# Patient Record
Sex: FEMALE | Race: WHITE | Employment: FULL TIME | URBAN - NONMETROPOLITAN AREA
[De-identification: names, ages, dates, MRNs, and addresses within clinical notes are randomized per-mention and may not be internally consistent; named-entity substitution may affect disease eponyms.]

---

## 2017-01-06 ENCOUNTER — OFFICE VISIT (OUTPATIENT)
Dept: URGENT CARE | Age: 32
End: 2017-01-06
Payer: COMMERCIAL

## 2017-01-06 VITALS
DIASTOLIC BLOOD PRESSURE: 84 MMHG | TEMPERATURE: 98.6 F | HEART RATE: 95 BPM | SYSTOLIC BLOOD PRESSURE: 116 MMHG | RESPIRATION RATE: 20 BRPM | OXYGEN SATURATION: 99 % | BODY MASS INDEX: 34.99 KG/M2 | HEIGHT: 65 IN | WEIGHT: 210 LBS

## 2017-01-06 DIAGNOSIS — G56.01 CARPAL TUNNEL SYNDROME OF RIGHT WRIST: ICD-10-CM

## 2017-01-06 DIAGNOSIS — M77.8 TENDONITIS OF WRIST, RIGHT: Primary | ICD-10-CM

## 2017-01-06 PROCEDURE — 99213 OFFICE O/P EST LOW 20 MIN: CPT | Performed by: NURSE PRACTITIONER

## 2017-01-06 RX ORDER — METHYLPREDNISOLONE 4 MG/1
TABLET ORAL
Qty: 1 KIT | Refills: 0 | Status: SHIPPED | OUTPATIENT
Start: 2017-01-06 | End: 2017-01-12

## 2017-01-06 ASSESSMENT — ENCOUNTER SYMPTOMS
RESPIRATORY NEGATIVE: 1
GASTROINTESTINAL NEGATIVE: 1

## 2017-01-11 ENCOUNTER — HOSPITAL ENCOUNTER (OUTPATIENT)
Dept: NEUROLOGY | Age: 32
Discharge: HOME | End: 2017-01-11
Payer: COMMERCIAL

## 2017-01-11 PROCEDURE — 95909 NRV CNDJ TST 5-6 STUDIES: CPT | Performed by: PSYCHIATRY & NEUROLOGY

## 2017-01-11 PROCEDURE — 95909 NRV CNDJ TST 5-6 STUDIES: CPT

## 2017-01-11 PROCEDURE — 95886 MUSC TEST DONE W/N TEST COMP: CPT

## 2017-01-11 PROCEDURE — 95886 MUSC TEST DONE W/N TEST COMP: CPT | Performed by: PSYCHIATRY & NEUROLOGY

## 2017-02-08 ENCOUNTER — OFFICE VISIT (OUTPATIENT)
Dept: NEUROLOGY | Facility: CLINIC | Age: 32
End: 2017-02-08

## 2017-02-08 VITALS
BODY MASS INDEX: 33.46 KG/M2 | DIASTOLIC BLOOD PRESSURE: 82 MMHG | SYSTOLIC BLOOD PRESSURE: 138 MMHG | HEIGHT: 64 IN | HEART RATE: 78 BPM | WEIGHT: 196 LBS

## 2017-02-08 DIAGNOSIS — G93.2 PSEUDOTUMOR CEREBRI: ICD-10-CM

## 2017-02-08 DIAGNOSIS — Z98.2 S/P VENTRICULOPERITONEAL SHUNT: ICD-10-CM

## 2017-02-08 DIAGNOSIS — G43.719 INTRACTABLE CHRONIC MIGRAINE WITHOUT AURA AND WITHOUT STATUS MIGRAINOSUS: Primary | ICD-10-CM

## 2017-02-08 PROCEDURE — 99213 OFFICE O/P EST LOW 20 MIN: CPT | Performed by: CLINICAL NURSE SPECIALIST

## 2017-02-08 RX ORDER — TOPIRAMATE 100 MG/1
100 TABLET, FILM COATED ORAL 2 TIMES DAILY
Qty: 60 TABLET | Refills: 5 | Status: SHIPPED | OUTPATIENT
Start: 2017-02-08 | End: 2017-09-08 | Stop reason: SDUPTHER

## 2017-02-08 RX ORDER — TOPIRAMATE 100 MG/1
100 TABLET, FILM COATED ORAL 2 TIMES DAILY
COMMUNITY
End: 2017-02-08 | Stop reason: SDUPTHER
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
## 2017-02-24 ENCOUNTER — HOSPITAL ENCOUNTER (EMERGENCY)
Age: 32
Discharge: LEFT BEFORE DISCHARGE | End: 2017-02-24
Payer: COMMERCIAL

## 2017-02-24 VITALS
SYSTOLIC BLOOD PRESSURE: 132 MMHG | WEIGHT: 189 LBS | DIASTOLIC BLOOD PRESSURE: 92 MMHG | RESPIRATION RATE: 18 BRPM | HEART RATE: 110 BPM | OXYGEN SATURATION: 98 % | BODY MASS INDEX: 31.49 KG/M2 | HEIGHT: 65 IN | TEMPERATURE: 98 F

## 2017-02-24 PROCEDURE — 4500000002 HC ER NO CHARGE

## 2017-02-24 ASSESSMENT — PAIN SCALES - GENERAL: PAINLEVEL_OUTOF10: 7

## 2017-02-25 ENCOUNTER — HOSPITAL ENCOUNTER (EMERGENCY)
Facility: HOSPITAL | Age: 32
Discharge: HOME | End: 2017-02-25
Attending: FAMILY MEDICINE | Admitting: FAMILY MEDICINE

## 2017-02-25 ENCOUNTER — APPOINTMENT (OUTPATIENT)
Dept: CT IMAGING | Facility: HOSPITAL | Age: 32
End: 2017-02-25

## 2017-02-25 VITALS
DIASTOLIC BLOOD PRESSURE: 86 MMHG | HEIGHT: 65 IN | BODY MASS INDEX: 31.49 KG/M2 | TEMPERATURE: 97.5 F | SYSTOLIC BLOOD PRESSURE: 123 MMHG | HEART RATE: 91 BPM | WEIGHT: 189 LBS | OXYGEN SATURATION: 100 % | RESPIRATION RATE: 16 BRPM

## 2017-02-25 DIAGNOSIS — N23 RENAL COLIC: Primary | ICD-10-CM

## 2017-02-25 LAB
ALBUMIN SERPL-MCNC: 4.5 G/DL (ref 3.5–5)
ALBUMIN/GLOB SERPL: 1.4 G/DL (ref 1.1–2.5)
ALP SERPL-CCNC: 95 U/L (ref 24–120)
ALT SERPL W P-5'-P-CCNC: 60 U/L (ref 0–54)
ANION GAP SERPL CALCULATED.3IONS-SCNC: 16 MMOL/L (ref 4–13)
AST SERPL-CCNC: 35 U/L (ref 7–45)
B-HCG UR QL: NEGATIVE
BACTERIA UR QL AUTO: ABNORMAL /HPF
BASOPHILS # BLD AUTO: 0.02 10*3/MM3 (ref 0–0.2)
BASOPHILS NFR BLD AUTO: 0.2 % (ref 0–2)
BILIRUB SERPL-MCNC: 0.8 MG/DL (ref 0.1–1)
BILIRUB UR QL STRIP: NEGATIVE
BUN BLD-MCNC: 9 MG/DL (ref 5–21)
BUN/CREAT SERPL: 8.4 (ref 7–25)
CALCIUM SPEC-SCNC: 9.6 MG/DL (ref 8.4–10.4)
CHLORIDE SERPL-SCNC: 111 MMOL/L (ref 98–110)
CLARITY UR: ABNORMAL
CO2 SERPL-SCNC: 18 MMOL/L (ref 24–31)
COLOR UR: ABNORMAL
CREAT BLD-MCNC: 1.07 MG/DL (ref 0.5–1.4)
DEPRECATED RDW RBC AUTO: 42.3 FL (ref 40–54)
EOSINOPHIL # BLD AUTO: 0.11 10*3/MM3 (ref 0–0.7)
EOSINOPHIL NFR BLD AUTO: 1.1 % (ref 0–4)
ERYTHROCYTE [DISTWIDTH] IN BLOOD BY AUTOMATED COUNT: 13.9 % (ref 12–15)
GFR SERPL CREATININE-BSD FRML MDRD: 59 ML/MIN/1.73
GLOBULIN UR ELPH-MCNC: 3.2 GM/DL
GLUCOSE BLD-MCNC: 150 MG/DL (ref 70–100)
GLUCOSE UR STRIP-MCNC: NEGATIVE MG/DL
HCT VFR BLD AUTO: 37.8 % (ref 37–47)
HGB BLD-MCNC: 13.8 G/DL (ref 12–16)
HGB UR QL STRIP.AUTO: ABNORMAL
HOLD SPECIMEN: NORMAL
HOLD SPECIMEN: NORMAL
HYALINE CASTS UR QL AUTO: ABNORMAL /LPF
IMM GRANULOCYTES # BLD: 0.03 10*3/MM3 (ref 0–0.03)
IMM GRANULOCYTES NFR BLD: 0.3 % (ref 0–5)
INTERNAL NEGATIVE CONTROL: NEGATIVE
INTERNAL POSITIVE CONTROL: POSITIVE
KETONES UR QL STRIP: NEGATIVE
LEUKOCYTE ESTERASE UR QL STRIP.AUTO: ABNORMAL
LYMPHOCYTES # BLD AUTO: 1.57 10*3/MM3 (ref 0.72–4.86)
LYMPHOCYTES NFR BLD AUTO: 16.3 % (ref 15–45)
Lab: NORMAL
MCH RBC QN AUTO: 30.8 PG (ref 28–32)
MCHC RBC AUTO-ENTMCNC: 36.5 G/DL (ref 33–36)
MCV RBC AUTO: 84.4 FL (ref 82–98)
MONOCYTES # BLD AUTO: 0.47 10*3/MM3 (ref 0.19–1.3)
MONOCYTES NFR BLD AUTO: 4.9 % (ref 4–12)
NEUTROPHILS # BLD AUTO: 7.46 10*3/MM3 (ref 1.87–8.4)
NEUTROPHILS NFR BLD AUTO: 77.2 % (ref 39–78)
NITRITE UR QL STRIP: NEGATIVE
PH UR STRIP.AUTO: 6 [PH] (ref 5–8)
PLATELET # BLD AUTO: 272 10*3/MM3 (ref 130–400)
PMV BLD AUTO: 12.4 FL (ref 6–12)
POTASSIUM BLD-SCNC: 3.7 MMOL/L (ref 3.5–5.3)
PROT SERPL-MCNC: 7.7 G/DL (ref 6.3–8.7)
PROT UR QL STRIP: ABNORMAL
RBC # BLD AUTO: 4.48 10*6/MM3 (ref 4.2–5.4)
RBC # UR: ABNORMAL /HPF
REF LAB TEST METHOD: ABNORMAL
SODIUM BLD-SCNC: 145 MMOL/L (ref 135–145)
SP GR UR STRIP: 1.03 (ref 1–1.03)
SQUAMOUS #/AREA URNS HPF: ABNORMAL /HPF
UROBILINOGEN UR QL STRIP: ABNORMAL
WBC NRBC COR # BLD: 9.66 10*3/MM3 (ref 4.8–10.8)
WBC UR QL AUTO: ABNORMAL /HPF
WHOLE BLOOD HOLD SPECIMEN: NORMAL

## 2017-02-25 PROCEDURE — 25010000002 KETOROLAC TROMETHAMINE PER 15 MG: Performed by: FAMILY MEDICINE

## 2017-02-25 PROCEDURE — 81001 URINALYSIS AUTO W/SCOPE: CPT | Performed by: FAMILY MEDICINE

## 2017-02-25 PROCEDURE — 96374 THER/PROPH/DIAG INJ IV PUSH: CPT

## 2017-02-25 PROCEDURE — 87086 URINE CULTURE/COLONY COUNT: CPT | Performed by: FAMILY MEDICINE

## 2017-02-25 PROCEDURE — 85025 COMPLETE CBC W/AUTO DIFF WBC: CPT | Performed by: FAMILY MEDICINE

## 2017-02-25 PROCEDURE — 96375 TX/PRO/DX INJ NEW DRUG ADDON: CPT

## 2017-02-25 PROCEDURE — 74176 CT ABD & PELVIS W/O CONTRAST: CPT

## 2017-02-25 PROCEDURE — 25010000002 ONDANSETRON PER 1 MG: Performed by: FAMILY MEDICINE

## 2017-02-25 PROCEDURE — 99283 EMERGENCY DEPT VISIT LOW MDM: CPT

## 2017-02-25 PROCEDURE — 80053 COMPREHEN METABOLIC PANEL: CPT | Performed by: FAMILY MEDICINE

## 2017-02-25 RX ORDER — ONDANSETRON 2 MG/ML
4 INJECTION INTRAMUSCULAR; INTRAVENOUS ONCE
Status: COMPLETED | OUTPATIENT
Start: 2017-02-25 | End: 2017-02-25

## 2017-02-25 RX ORDER — CEFUROXIME AXETIL 500 MG/1
500 TABLET ORAL 2 TIMES DAILY
Qty: 20 TABLET | Refills: 0 | Status: SHIPPED | OUTPATIENT
Start: 2017-02-25 | End: 2017-08-01

## 2017-02-25 RX ORDER — OXYCODONE AND ACETAMINOPHEN 7.5; 325 MG/1; MG/1
1 TABLET ORAL EVERY 6 HOURS PRN
Qty: 25 TABLET | Refills: 0 | Status: SHIPPED | OUTPATIENT
Start: 2017-02-25 | End: 2017-08-01

## 2017-02-25 RX ORDER — KETOROLAC TROMETHAMINE 30 MG/ML
30 INJECTION, SOLUTION INTRAMUSCULAR; INTRAVENOUS ONCE
Status: COMPLETED | OUTPATIENT
Start: 2017-02-25 | End: 2017-02-25

## 2017-02-25 RX ORDER — KETOROLAC TROMETHAMINE 10 MG/1
10 TABLET, FILM COATED ORAL EVERY 6 HOURS PRN
Qty: 15 TABLET | Refills: 0 | Status: SHIPPED | OUTPATIENT
Start: 2017-02-25 | End: 2017-08-01

## 2017-02-25 RX ADMIN — KETOROLAC TROMETHAMINE 30 MG: 30 INJECTION, SOLUTION INTRAMUSCULAR at 01:35

## 2017-02-25 RX ADMIN — ONDANSETRON 4 MG: 2 INJECTION INTRAMUSCULAR; INTRAVENOUS at 04:21

## 2017-02-27 LAB
BACTERIA SPEC AEROBE CULT: ABNORMAL
BACTERIA SPEC AEROBE CULT: ABNORMAL

## 2017-03-01 ENCOUNTER — HOSPITAL ENCOUNTER (OUTPATIENT)
Dept: GENERAL RADIOLOGY | Age: 32
Discharge: HOME | End: 2017-03-01
Payer: COMMERCIAL

## 2017-03-01 ENCOUNTER — OFFICE VISIT (OUTPATIENT)
Dept: UROLOGY | Age: 32
End: 2017-03-01
Payer: COMMERCIAL

## 2017-03-01 VITALS
HEIGHT: 65 IN | OXYGEN SATURATION: 98 % | WEIGHT: 193 LBS | TEMPERATURE: 98.2 F | BODY MASS INDEX: 32.15 KG/M2 | SYSTOLIC BLOOD PRESSURE: 126 MMHG | HEART RATE: 84 BPM | DIASTOLIC BLOOD PRESSURE: 72 MMHG

## 2017-03-01 DIAGNOSIS — N20.1 LEFT URETERAL STONE: Primary | ICD-10-CM

## 2017-03-01 DIAGNOSIS — N20.1 RIGHT URETERAL CALCULUS: ICD-10-CM

## 2017-03-01 LAB
BACTERIA URINE, POC: NORMAL
BILIRUBIN URINE: 0 MG/DL
BLOOD, URINE: NEGATIVE
CASTS URINE, POC: NORMAL
CLARITY: NORMAL
COLOR: NORMAL
CRYSTALS URINE, POC: NORMAL
EPI CELLS URINE, POC: NORMAL
GLUCOSE URINE: NORMAL
KETONES, URINE: NEGATIVE
LEUKOCYTE EST, POC: NORMAL
NITRITE, URINE: NEGATIVE
PH UA: 6 (ref 4.5–8)
PROTEIN UA: NEGATIVE
RBC URINE, POC: NORMAL
SPECIFIC GRAVITY UA: 1.02 (ref 1–1.03)
UROBILINOGEN, URINE: NORMAL
WBC URINE, POC: NORMAL
YEAST URINE, POC: NORMAL

## 2017-03-01 PROCEDURE — 81000 URINALYSIS NONAUTO W/SCOPE: CPT | Performed by: PHYSICIAN ASSISTANT

## 2017-03-01 PROCEDURE — 99214 OFFICE O/P EST MOD 30 MIN: CPT | Performed by: PHYSICIAN ASSISTANT

## 2017-03-01 PROCEDURE — 74000 XR ABDOMEN LIMITED (KUB): CPT

## 2017-03-01 RX ORDER — OXYCODONE AND ACETAMINOPHEN 7.5; 325 MG/1; MG/1
1 TABLET ORAL
COMMUNITY
Start: 2017-02-25 | End: 2017-09-22

## 2017-03-01 RX ORDER — CEFUROXIME AXETIL 500 MG/1
500 TABLET ORAL
COMMUNITY
Start: 2017-02-25 | End: 2017-03-15 | Stop reason: ALTCHOICE

## 2017-03-01 RX ORDER — KETOROLAC TROMETHAMINE 10 MG/1
TABLET, FILM COATED ORAL
Refills: 0 | COMMUNITY
Start: 2017-02-25 | End: 2017-03-01

## 2017-03-01 RX ORDER — KETOROLAC TROMETHAMINE 10 MG/1
10 TABLET, FILM COATED ORAL
COMMUNITY
Start: 2017-02-25 | End: 2017-09-22

## 2017-03-01 RX ORDER — TAMSULOSIN HYDROCHLORIDE 0.4 MG/1
0.4 CAPSULE ORAL DAILY
Qty: 30 CAPSULE | Refills: 3 | Status: SHIPPED | OUTPATIENT
Start: 2017-03-01 | End: 2017-07-11

## 2017-03-01 ASSESSMENT — ENCOUNTER SYMPTOMS
BLURRED VISION: 0
VOMITING: 0
WHEEZING: 0
HEARTBURN: 0
SORE THROAT: 0
DOUBLE VISION: 0
NAUSEA: 1
SHORTNESS OF BREATH: 0

## 2017-03-08 ENCOUNTER — OFFICE VISIT (OUTPATIENT)
Dept: UROLOGY | Age: 32
End: 2017-03-08
Payer: COMMERCIAL

## 2017-03-08 ENCOUNTER — HOSPITAL ENCOUNTER (OUTPATIENT)
Dept: GENERAL RADIOLOGY | Age: 32
Discharge: HOME | End: 2017-03-08
Payer: COMMERCIAL

## 2017-03-08 VITALS
OXYGEN SATURATION: 98 % | SYSTOLIC BLOOD PRESSURE: 128 MMHG | TEMPERATURE: 97.4 F | DIASTOLIC BLOOD PRESSURE: 74 MMHG | BODY MASS INDEX: 31.65 KG/M2 | HEIGHT: 65 IN | WEIGHT: 190 LBS | HEART RATE: 115 BPM

## 2017-03-08 DIAGNOSIS — N20.1 LEFT URETERAL STONE: ICD-10-CM

## 2017-03-08 DIAGNOSIS — N20.1 LEFT URETERAL STONE: Primary | ICD-10-CM

## 2017-03-08 LAB
BILIRUBIN, POC: NORMAL
BLOOD URINE, POC: NORMAL
CLARITY, POC: CLEAR
COLOR, POC: YELLOW
GLUCOSE URINE, POC: NORMAL
KETONES, POC: NORMAL
LEUKOCYTE EST, POC: NORMAL
NITRITE, POC: NORMAL
PH, POC: 7
PROTEIN, POC: NORMAL
SPECIFIC GRAVITY, POC: 1.01
UROBILINOGEN, POC: 0.2

## 2017-03-08 PROCEDURE — 99214 OFFICE O/P EST MOD 30 MIN: CPT | Performed by: PHYSICIAN ASSISTANT

## 2017-03-08 PROCEDURE — 81002 URINALYSIS NONAUTO W/O SCOPE: CPT | Performed by: PHYSICIAN ASSISTANT

## 2017-03-08 PROCEDURE — 74000 XR ABDOMEN LIMITED (KUB): CPT

## 2017-03-08 ASSESSMENT — ENCOUNTER SYMPTOMS
HEARTBURN: 0
NAUSEA: 1
DOUBLE VISION: 0
SHORTNESS OF BREATH: 0
SORE THROAT: 0
BLURRED VISION: 0
VOMITING: 0
WHEEZING: 0

## 2017-03-15 ENCOUNTER — OFFICE VISIT (OUTPATIENT)
Dept: OBGYN | Age: 32
End: 2017-03-15
Payer: COMMERCIAL

## 2017-03-15 ENCOUNTER — OFFICE VISIT (OUTPATIENT)
Dept: UROLOGY | Age: 32
End: 2017-03-15
Payer: COMMERCIAL

## 2017-03-15 ENCOUNTER — HOSPITAL ENCOUNTER (OUTPATIENT)
Dept: CT IMAGING | Age: 32
Discharge: HOME | End: 2017-03-15
Payer: COMMERCIAL

## 2017-03-15 VITALS
DIASTOLIC BLOOD PRESSURE: 74 MMHG | HEIGHT: 65 IN | SYSTOLIC BLOOD PRESSURE: 122 MMHG | BODY MASS INDEX: 31.49 KG/M2 | WEIGHT: 189 LBS | TEMPERATURE: 97.9 F | HEART RATE: 106 BPM | OXYGEN SATURATION: 97 %

## 2017-03-15 VITALS
DIASTOLIC BLOOD PRESSURE: 60 MMHG | WEIGHT: 189 LBS | BODY MASS INDEX: 31.49 KG/M2 | HEIGHT: 65 IN | SYSTOLIC BLOOD PRESSURE: 120 MMHG

## 2017-03-15 DIAGNOSIS — N92.6 IRREGULAR PERIODS: Primary | ICD-10-CM

## 2017-03-15 DIAGNOSIS — N20.0 RIGHT KIDNEY STONE: ICD-10-CM

## 2017-03-15 DIAGNOSIS — N20.1 LEFT URETERAL STONE: ICD-10-CM

## 2017-03-15 DIAGNOSIS — N20.1 RIGHT URETERAL CALCULUS: Primary | ICD-10-CM

## 2017-03-15 DIAGNOSIS — Z76.89 ENCOUNTER TO ESTABLISH CARE: ICD-10-CM

## 2017-03-15 PROCEDURE — 99213 OFFICE O/P EST LOW 20 MIN: CPT | Performed by: PHYSICIAN ASSISTANT

## 2017-03-15 PROCEDURE — 99214 OFFICE O/P EST MOD 30 MIN: CPT | Performed by: NURSE PRACTITIONER

## 2017-03-15 PROCEDURE — 74150 CT ABDOMEN W/O CONTRAST: CPT

## 2017-03-15 ASSESSMENT — ENCOUNTER SYMPTOMS
DOUBLE VISION: 0
SORE THROAT: 0
GASTROINTESTINAL NEGATIVE: 1
NAUSEA: 1
RESPIRATORY NEGATIVE: 1
EYES NEGATIVE: 1
HEARTBURN: 0
WHEEZING: 0
SHORTNESS OF BREATH: 0
BLURRED VISION: 0
VOMITING: 0

## 2017-04-02 ENCOUNTER — APPOINTMENT (OUTPATIENT)
Dept: GENERAL RADIOLOGY | Age: 32
End: 2017-04-02
Payer: COMMERCIAL

## 2017-04-02 ENCOUNTER — HOSPITAL ENCOUNTER (EMERGENCY)
Age: 32
Discharge: HOME | End: 2017-04-02
Payer: COMMERCIAL

## 2017-04-02 VITALS
RESPIRATION RATE: 18 BRPM | TEMPERATURE: 98.7 F | HEART RATE: 110 BPM | HEIGHT: 65 IN | BODY MASS INDEX: 30.82 KG/M2 | DIASTOLIC BLOOD PRESSURE: 83 MMHG | OXYGEN SATURATION: 96 % | WEIGHT: 185 LBS | SYSTOLIC BLOOD PRESSURE: 119 MMHG

## 2017-04-02 DIAGNOSIS — R07.89 MUSCULOSKELETAL CHEST PAIN: Primary | ICD-10-CM

## 2017-04-02 LAB — D DIMER: 0.33 NG/ML DDU (ref 0–0.48)

## 2017-04-02 PROCEDURE — 36415 COLL VENOUS BLD VENIPUNCTURE: CPT

## 2017-04-02 PROCEDURE — 99283 EMERGENCY DEPT VISIT LOW MDM: CPT | Performed by: NURSE PRACTITIONER

## 2017-04-02 PROCEDURE — 71020 XR CHEST STANDARD TWO VW: CPT

## 2017-04-02 PROCEDURE — 85379 FIBRIN DEGRADATION QUANT: CPT

## 2017-04-02 PROCEDURE — 99284 EMERGENCY DEPT VISIT MOD MDM: CPT

## 2017-04-02 RX ORDER — NAPROXEN 500 MG/1
500 TABLET ORAL 2 TIMES DAILY
Qty: 20 TABLET | Refills: 0 | Status: SHIPPED | OUTPATIENT
Start: 2017-04-02 | End: 2017-09-22

## 2017-04-02 RX ORDER — CYCLOBENZAPRINE HCL 10 MG
10 TABLET ORAL 3 TIMES DAILY PRN
Qty: 30 TABLET | Refills: 0 | Status: SHIPPED | OUTPATIENT
Start: 2017-04-02 | End: 2017-04-12

## 2017-04-02 ASSESSMENT — ENCOUNTER SYMPTOMS
SHORTNESS OF BREATH: 0
BACK PAIN: 0
DIARRHEA: 0
VOMITING: 0
COUGH: 0
WHEEZING: 0
ABDOMINAL PAIN: 0
NAUSEA: 0
SORE THROAT: 0
EYE DISCHARGE: 0

## 2017-04-02 ASSESSMENT — PAIN DESCRIPTION - ORIENTATION: ORIENTATION: LEFT

## 2017-04-02 ASSESSMENT — PAIN SCALES - GENERAL: PAINLEVEL_OUTOF10: 6

## 2017-04-02 ASSESSMENT — PAIN DESCRIPTION - LOCATION: LOCATION: SHOULDER

## 2017-04-02 ASSESSMENT — PAIN DESCRIPTION - DESCRIPTORS: DESCRIPTORS: SHARP

## 2017-04-02 ASSESSMENT — PAIN DESCRIPTION - PAIN TYPE: TYPE: ACUTE PAIN

## 2017-06-16 ENCOUNTER — TELEPHONE (OUTPATIENT)
Dept: NEUROLOGY | Facility: CLINIC | Age: 32
End: 2017-06-16

## 2017-06-16 RX ORDER — PROMETHAZINE HYDROCHLORIDE 25 MG/1
25 TABLET ORAL EVERY 6 HOURS PRN
Qty: 10 TABLET | Refills: 0 | Status: SHIPPED | OUTPATIENT
Start: 2017-06-16 | End: 2017-08-01

## 2017-06-16 RX ORDER — SUMATRIPTAN 50 MG/1
TABLET, FILM COATED ORAL
Qty: 30 TABLET | Refills: 2 | Status: SHIPPED | OUTPATIENT
Start: 2017-06-16 | End: 2018-01-30 | Stop reason: SDUPTHER

## 2017-06-16 NOTE — TELEPHONE ENCOUNTER
Sandy called to let Amie Cerda know that she has had a headache for the last 2 weeks and Ibuprofen is not helping at all. She is calling in to see if Amie will call in something for nausea and something to get rid of the headache. I did tell her that I will send a message and call her back this afternoon once I have an answer for her. She did voice understanding.

## 2017-06-17 ENCOUNTER — HOSPITAL ENCOUNTER (EMERGENCY)
Facility: HOSPITAL | Age: 32
Discharge: HOME | End: 2017-06-17
Admitting: EMERGENCY MEDICINE

## 2017-06-17 ENCOUNTER — APPOINTMENT (OUTPATIENT)
Dept: CT IMAGING | Facility: HOSPITAL | Age: 32
End: 2017-06-17

## 2017-06-17 VITALS
OXYGEN SATURATION: 98 % | SYSTOLIC BLOOD PRESSURE: 113 MMHG | BODY MASS INDEX: 28.66 KG/M2 | WEIGHT: 172 LBS | HEIGHT: 65 IN | TEMPERATURE: 98.6 F | RESPIRATION RATE: 16 BRPM | HEART RATE: 84 BPM | DIASTOLIC BLOOD PRESSURE: 70 MMHG

## 2017-06-17 DIAGNOSIS — R51.9 NONINTRACTABLE HEADACHE, UNSPECIFIED CHRONICITY PATTERN, UNSPECIFIED HEADACHE TYPE: Primary | ICD-10-CM

## 2017-06-17 LAB
B-HCG UR QL: NEGATIVE
INTERNAL NEGATIVE CONTROL: NEGATIVE
INTERNAL POSITIVE CONTROL: POSITIVE
Lab: NORMAL

## 2017-06-17 PROCEDURE — 70450 CT HEAD/BRAIN W/O DYE: CPT

## 2017-06-17 PROCEDURE — 99283 EMERGENCY DEPT VISIT LOW MDM: CPT

## 2017-06-17 NOTE — DISCHARGE INSTRUCTIONS
Please f/u with your PCP in 1-2 days for a recheck  R/t to the ER as needed  Please f/u with Dr. Garcia on Monday
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
## 2017-07-11 ENCOUNTER — HOSPITAL ENCOUNTER (EMERGENCY)
Age: 32
Discharge: HOME | End: 2017-07-11
Payer: COMMERCIAL

## 2017-07-11 ENCOUNTER — APPOINTMENT (OUTPATIENT)
Dept: CT IMAGING | Age: 32
End: 2017-07-11
Payer: COMMERCIAL

## 2017-07-11 VITALS
BODY MASS INDEX: 28.66 KG/M2 | TEMPERATURE: 98.3 F | OXYGEN SATURATION: 98 % | WEIGHT: 172 LBS | SYSTOLIC BLOOD PRESSURE: 118 MMHG | HEART RATE: 102 BPM | RESPIRATION RATE: 18 BRPM | DIASTOLIC BLOOD PRESSURE: 78 MMHG | HEIGHT: 65 IN

## 2017-07-11 DIAGNOSIS — N20.1 LEFT URETERAL CALCULUS: Primary | ICD-10-CM

## 2017-07-11 LAB
ALBUMIN SERPL-MCNC: 4.1 G/DL (ref 3.5–5.2)
ALP BLD-CCNC: 92 U/L (ref 35–104)
ALT SERPL-CCNC: 15 U/L (ref 5–33)
ANION GAP SERPL CALCULATED.3IONS-SCNC: 13 MMOL/L (ref 7–19)
AST SERPL-CCNC: 16 U/L (ref 5–32)
BACTERIA: NEGATIVE /HPF
BASOPHILS ABSOLUTE: 0.1 K/UL (ref 0–0.2)
BASOPHILS RELATIVE PERCENT: 0.5 % (ref 0–1)
BILIRUB SERPL-MCNC: 0.4 MG/DL (ref 0.2–1.2)
BILIRUBIN URINE: NEGATIVE
BLOOD, URINE: ABNORMAL
BUN BLDV-MCNC: 13 MG/DL (ref 6–20)
CALCIUM SERPL-MCNC: 9.1 MG/DL (ref 8.6–10)
CHLORIDE BLD-SCNC: 107 MMOL/L (ref 98–111)
CLARITY: ABNORMAL
CO2: 22 MMOL/L (ref 22–29)
COLOR: YELLOW
CREAT SERPL-MCNC: 0.8 MG/DL (ref 0.5–0.9)
EOSINOPHILS ABSOLUTE: 0.4 K/UL (ref 0–0.6)
EOSINOPHILS RELATIVE PERCENT: 2.8 % (ref 0–5)
EPITHELIAL CELLS, UA: 16 /HPF (ref 0–5)
GFR NON-AFRICAN AMERICAN: >60
GLUCOSE BLD-MCNC: 128 MG/DL (ref 74–109)
GLUCOSE URINE: NEGATIVE MG/DL
HCG(URINE) PREGNANCY TEST: NEGATIVE
HCT VFR BLD CALC: 38.5 % (ref 37–47)
HEMOGLOBIN: 13.6 G/DL (ref 12–16)
HYALINE CASTS: 4 /HPF (ref 0–8)
KETONES, URINE: NEGATIVE MG/DL
LEUKOCYTE ESTERASE, URINE: ABNORMAL
LYMPHOCYTES ABSOLUTE: 3.3 K/UL (ref 1.1–4.5)
LYMPHOCYTES RELATIVE PERCENT: 24.9 % (ref 20–40)
MCH RBC QN AUTO: 31.6 PG (ref 27–31)
MCHC RBC AUTO-ENTMCNC: 35.3 G/DL (ref 33–37)
MCV RBC AUTO: 89.5 FL (ref 81–99)
MONOCYTES ABSOLUTE: 0.7 K/UL (ref 0–0.9)
MONOCYTES RELATIVE PERCENT: 5.4 % (ref 0–10)
NEUTROPHILS ABSOLUTE: 8.6 K/UL (ref 1.5–7.5)
NEUTROPHILS RELATIVE PERCENT: 65.3 % (ref 50–65)
NITRITE, URINE: NEGATIVE
PDW BLD-RTO: 13.3 % (ref 11.5–14.5)
PH UA: 6
PLATELET # BLD: 282 K/UL (ref 130–400)
PMV BLD AUTO: 11.4 FL (ref 9.4–12.3)
POTASSIUM SERPL-SCNC: 3.8 MMOL/L (ref 3.5–5)
PROTEIN UA: ABNORMAL MG/DL
RBC # BLD: 4.3 M/UL (ref 4.2–5.4)
RBC UA: >900 /HPF (ref 0–4)
SODIUM BLD-SCNC: 142 MMOL/L (ref 136–145)
SPECIFIC GRAVITY UA: 1.02
TOTAL PROTEIN: 7.5 G/DL (ref 6.6–8.7)
UROBILINOGEN, URINE: 0.2 E.U./DL
WBC # BLD: 13.1 K/UL (ref 4.8–10.8)
WBC UA: 21 /HPF (ref 0–5)

## 2017-07-11 PROCEDURE — 81001 URINALYSIS AUTO W/SCOPE: CPT

## 2017-07-11 PROCEDURE — 99284 EMERGENCY DEPT VISIT MOD MDM: CPT

## 2017-07-11 PROCEDURE — 81025 URINE PREGNANCY TEST: CPT

## 2017-07-11 PROCEDURE — 85025 COMPLETE CBC W/AUTO DIFF WBC: CPT

## 2017-07-11 PROCEDURE — 74150 CT ABDOMEN W/O CONTRAST: CPT

## 2017-07-11 PROCEDURE — 99283 EMERGENCY DEPT VISIT LOW MDM: CPT | Performed by: PHYSICIAN ASSISTANT

## 2017-07-11 PROCEDURE — 36415 COLL VENOUS BLD VENIPUNCTURE: CPT

## 2017-07-11 PROCEDURE — 80053 COMPREHEN METABOLIC PANEL: CPT

## 2017-07-11 PROCEDURE — 87086 URINE CULTURE/COLONY COUNT: CPT

## 2017-07-11 RX ORDER — HYDROCODONE BITARTRATE AND ACETAMINOPHEN 5; 325 MG/1; MG/1
1 TABLET ORAL EVERY 6 HOURS PRN
Qty: 15 TABLET | Refills: 0 | Status: SHIPPED | OUTPATIENT
Start: 2017-07-11 | End: 2017-07-18

## 2017-07-11 RX ORDER — ONDANSETRON 2 MG/ML
4 INJECTION INTRAMUSCULAR; INTRAVENOUS ONCE
Status: DISCONTINUED | OUTPATIENT
Start: 2017-07-11 | End: 2017-07-11

## 2017-07-11 RX ORDER — 0.9 % SODIUM CHLORIDE 0.9 %
1000 INTRAVENOUS SOLUTION INTRAVENOUS ONCE
Status: DISCONTINUED | OUTPATIENT
Start: 2017-07-11 | End: 2017-07-11

## 2017-07-11 RX ORDER — MORPHINE SULFATE 4 MG/ML
4 INJECTION, SOLUTION INTRAMUSCULAR; INTRAVENOUS ONCE
Status: DISCONTINUED | OUTPATIENT
Start: 2017-07-11 | End: 2017-07-11

## 2017-07-11 RX ORDER — ONDANSETRON 4 MG/1
4 TABLET, ORALLY DISINTEGRATING ORAL EVERY 8 HOURS PRN
Qty: 20 TABLET | Refills: 0 | Status: SHIPPED | OUTPATIENT
Start: 2017-07-11 | End: 2017-09-22

## 2017-07-11 RX ORDER — TAMSULOSIN HYDROCHLORIDE 0.4 MG/1
0.4 CAPSULE ORAL DAILY
Qty: 15 CAPSULE | Refills: 0 | Status: SHIPPED | OUTPATIENT
Start: 2017-07-11 | End: 2017-09-22

## 2017-07-11 ASSESSMENT — PAIN DESCRIPTION - PAIN TYPE: TYPE: ACUTE PAIN

## 2017-07-11 ASSESSMENT — ENCOUNTER SYMPTOMS
APNEA: 0
CHEST TIGHTNESS: 0
DIARRHEA: 0
CONSTIPATION: 0
ABDOMINAL DISTENTION: 0
VOMITING: 0
WHEEZING: 0
EYE PAIN: 0
NAUSEA: 0
COUGH: 0
RHINORRHEA: 0
SINUS PRESSURE: 0
SHORTNESS OF BREATH: 0
SORE THROAT: 0
ABDOMINAL PAIN: 0

## 2017-07-11 ASSESSMENT — PAIN SCALES - GENERAL: PAINLEVEL_OUTOF10: 7

## 2017-07-11 ASSESSMENT — PAIN DESCRIPTION - ORIENTATION: ORIENTATION: LEFT

## 2017-07-11 ASSESSMENT — PAIN DESCRIPTION - LOCATION: LOCATION: ABDOMEN

## 2017-07-13 LAB — URINE CULTURE, ROUTINE: NORMAL

## 2017-08-01 ENCOUNTER — OFFICE VISIT (OUTPATIENT)
Dept: NEUROSURGERY | Facility: CLINIC | Age: 32
End: 2017-08-01

## 2017-08-01 VITALS
BODY MASS INDEX: 28.66 KG/M2 | SYSTOLIC BLOOD PRESSURE: 124 MMHG | HEIGHT: 65 IN | DIASTOLIC BLOOD PRESSURE: 78 MMHG | WEIGHT: 172 LBS

## 2017-08-01 DIAGNOSIS — G93.2 PSEUDOTUMOR CEREBRI: Primary | ICD-10-CM

## 2017-08-01 DIAGNOSIS — Z78.9 NON-SMOKER: ICD-10-CM

## 2017-08-01 DIAGNOSIS — G43.719 INTRACTABLE CHRONIC MIGRAINE WITHOUT AURA AND WITHOUT STATUS MIGRAINOSUS: ICD-10-CM

## 2017-08-01 PROCEDURE — 99213 OFFICE O/P EST LOW 20 MIN: CPT | Performed by: NEUROLOGICAL SURGERY

## 2017-08-01 NOTE — PATIENT INSTRUCTIONS

## 2017-08-16 ENCOUNTER — OFFICE VISIT (OUTPATIENT)
Dept: RETAIL CLINIC | Facility: CLINIC | Age: 32
End: 2017-08-16

## 2017-08-16 VITALS
HEART RATE: 98 BPM | WEIGHT: 177.8 LBS | BODY MASS INDEX: 29.62 KG/M2 | OXYGEN SATURATION: 98 % | DIASTOLIC BLOOD PRESSURE: 68 MMHG | RESPIRATION RATE: 18 BRPM | SYSTOLIC BLOOD PRESSURE: 132 MMHG | TEMPERATURE: 97.7 F | HEIGHT: 65 IN
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
## 2017-08-16 PROCEDURE — 99213 OFFICE O/P EST LOW 20 MIN: CPT | Performed by: NURSE PRACTITIONER

## 2017-08-16 RX ORDER — DIPHENHYDRAMINE HCL 25 MG
25 TABLET ORAL EVERY 6 HOURS PRN
Qty: 60 TABLET | Refills: 0 | Status: SHIPPED | OUTPATIENT
Start: 2017-08-16 | End: 2018-03-22
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
## 2017-08-16 NOTE — PATIENT INSTRUCTIONS
Treatment     Treatment overview      eradication of the bed bug infections is key to management(1, 4, 5)    educate patients about    identification of bed bugs    risk factor avoidance    nonchemical eradication strategies    advise that chemical insecticides be applied by professionals    efficacy may be greater with professional application as every object in environment must be treated    misuse has been associated with significant toxicities, including 1 death    advise patients to evaluate success of eradication strategy about 4-20 days after first treatment    professionals may be better able to identify bed bug hiding places    second application of insecticides may be improve efficacy    treatment of skin reactions to bed bug bites is supportive(1, 4, 5)    consider topical agents to help with symptom control for most patients, including    over-the-counter or prescription topical antipruritic agents    an intermediate potency corticosteroid, such as 1% hydrocortisone once or twice daily for up to 7 days    reserve systemic therapy, such as antihistamines, for those with severe symptoms disturbing sleep    note that topical mupirocin or systemic antibiotics may be needed for bacterial superinfection(1)  Bed bug eradication     Identification of bed bugs      educate patients about(1, 3)    bed bug characteristics    oval shaped, flat, approximately 5 mm long (1 inch), easily visible    reddish brown in color (immature bugs may be light yellow)    active at night, avoid light    most often found in mattress seams, crevices in box spring, back sides of headboard, spaces under baseboards, or loose wallpaper    signs of infestation or recent presence of bed bugs    red-brown blood stains on bed sheets, mattress, and box spring    small brown-black marks (fecal matter) in furnishing crevices, especially mattress and baseboards    skin casts    offensive odor may be present if large number of bugs  Nonchemical  pest control      nonchemical eradication strategies may be helpful but efficacy data limited; options include(4)    disposal of heavily infested mattresses or linens    washing clothes or linens at high or freezing temperatures    physical removal of bed bugs and mattress covers and vacuuming  Chemical control      3 major classes of insecticides are used for eradication(1, 2, 4)    pyrethroids (most common)    insect growth regulators    carbamates    effectiveness of insecticides depends on factors such as    extent of infestation    preparation of infested area prior to application (such as removal of hiding places and laundering of linens)    quality and coverage of pesticide application    stage of life of the bed bug    susceptibility of bed bug population to specific class of insecticide    Reference - [place] Environmental Protection Agency (EPA) Effectiveness of Bed Bug Pesticides (EPA 2016 Mar 29)    combination insecticide application is typical(1, 2, 4)    used to target all bed bugs at all stages of their lifecycle    used to overcome potential resistance, which has been detected to all insecticide classes    insecticides should be applied by professionals(2)    successful eradication requires thorough application    misuse can result in toxicities, with 1 death reported from bed bug insecticide use     insecticide-related illness may occur with excessive or improper application of bed bug insecticide    based on data collected by [place] Event Notification System for Occupational Risks (SENSOR)-Pesticides program and [facility] Department of Health and Mental Hygiene ([facility])    111 cases occurred in 7 states from 2003 to 2010 in [place]    81% considered low severity    93% occurred in private residence    39% applied by residents who were not certified    reported health outcomes included    neurologic symptoms in 40% (headache, dizziness)    respiratory symptoms in 40% (including  dyspnea and upper respiratory tract pain or irritation)    gastrointestinal symptoms in 33% (nausea and vomiting)    contributing factors included    excessive application of pesticide 18%    failure to wash or change bedding after pesticide use 16%    inadequate notification of pesticide use 11%    1 fatality in woman with history of kidney disease, heart disease, and diabetes and excessive use of pesticide    Reference - MMWR Morb Mortal Wkly Rep 2011 Sep 23;6037):1269[employer] Full Text full-text  Evaluation of success of eradication strategies      consider re-inspection of infested area by a pest-control professional 4-20 days after first intervention(1, 2, 4)    consider reapplication of insecticides at this stage as optimal strategy for control not yet determined(1, 2, 4)

## 2017-09-12 RX ORDER — TOPIRAMATE 100 MG/1
TABLET, FILM COATED ORAL
Qty: 60 TABLET | Refills: 5 | Status: SHIPPED | OUTPATIENT
Start: 2017-09-12 | End: 2018-03-20 | Stop reason: SDUPTHER

## 2017-09-22 ENCOUNTER — OFFICE VISIT (OUTPATIENT)
Dept: OBGYN | Age: 32
End: 2017-09-22
Payer: COMMERCIAL

## 2017-09-22 VITALS
SYSTOLIC BLOOD PRESSURE: 107 MMHG | TEMPERATURE: 98.3 F | WEIGHT: 176 LBS | BODY MASS INDEX: 30.05 KG/M2 | HEIGHT: 64 IN | DIASTOLIC BLOOD PRESSURE: 82 MMHG | HEART RATE: 106 BPM

## 2017-09-22 DIAGNOSIS — Z01.419 WOMEN'S ANNUAL ROUTINE GYNECOLOGICAL EXAMINATION: Primary | ICD-10-CM

## 2017-09-22 DIAGNOSIS — Z12.4 SCREENING FOR CERVICAL CANCER: ICD-10-CM

## 2017-09-22 DIAGNOSIS — N92.6 IRREGULAR MENSES: ICD-10-CM

## 2017-09-22 DIAGNOSIS — Z31.69 INFERTILITY COUNSELING: ICD-10-CM

## 2017-09-22 PROCEDURE — 99395 PREV VISIT EST AGE 18-39: CPT | Performed by: OBSTETRICS & GYNECOLOGY

## 2017-09-22 RX ORDER — CITALOPRAM 40 MG/1
TABLET ORAL
Refills: 1 | COMMUNITY
Start: 2017-09-20 | End: 2018-11-25

## 2017-09-22 ASSESSMENT — ENCOUNTER SYMPTOMS
RESPIRATORY NEGATIVE: 1
EYES NEGATIVE: 1
GASTROINTESTINAL NEGATIVE: 1
ALLERGIC/IMMUNOLOGIC NEGATIVE: 1

## 2017-09-28 ENCOUNTER — TELEPHONE (OUTPATIENT)
Dept: OBGYN | Age: 32
End: 2017-09-28

## 2018-01-30 ENCOUNTER — TELEPHONE (OUTPATIENT)
Dept: NEUROLOGY | Facility: CLINIC | Age: 33
End: 2018-01-30

## 2018-01-30 RX ORDER — SUMATRIPTAN 50 MG/1
TABLET, FILM COATED ORAL
Qty: 9 TABLET | Refills: 2 | Status: SHIPPED | OUTPATIENT
Start: 2018-01-30 | End: 2019-02-13 | Stop reason: SINTOL

## 2018-02-07 ENCOUNTER — TELEPHONE (OUTPATIENT)
Dept: OBGYN | Age: 33
End: 2018-02-07

## 2018-02-07 DIAGNOSIS — N97.9 INFERTILITY, FEMALE: Primary | ICD-10-CM

## 2018-02-09 NOTE — TELEPHONE ENCOUNTER
Returned call to pt and told her that her labs were up to date and that she needs an HSG as discussed at her last appt. Pt states she wishes to do that here rather than [facility]. I instructed her to call when she starts her period and we will schedule 7-11 days after it starts. Pt v/u.

## 2018-02-21 ENCOUNTER — TELEPHONE (OUTPATIENT)
Dept: OBGYN | Age: 33
End: 2018-02-21

## 2018-02-25 ENCOUNTER — HOSPITAL ENCOUNTER (EMERGENCY)
Age: 33
Discharge: HOME | End: 2018-02-26
Attending: EMERGENCY MEDICINE
Payer: COMMERCIAL

## 2018-02-25 DIAGNOSIS — N23 RENAL COLIC: ICD-10-CM

## 2018-02-25 DIAGNOSIS — N20.1 URETEROLITHIASIS: ICD-10-CM

## 2018-02-25 DIAGNOSIS — R10.9 RIGHT FLANK PAIN: Primary | ICD-10-CM

## 2018-02-25 LAB
BASOPHILS ABSOLUTE: 0.1 K/UL (ref 0–0.2)
BASOPHILS RELATIVE PERCENT: 0.5 % (ref 0–1)
EOSINOPHILS ABSOLUTE: 0.4 K/UL (ref 0–0.6)
EOSINOPHILS RELATIVE PERCENT: 3 % (ref 0–5)
HCG QUALITATIVE: NEGATIVE
HCT VFR BLD CALC: 38.3 % (ref 37–47)
HEMOGLOBIN: 13.2 G/DL (ref 12–16)
LYMPHOCYTES ABSOLUTE: 3 K/UL (ref 1.1–4.5)
LYMPHOCYTES RELATIVE PERCENT: 22.7 % (ref 20–40)
MCH RBC QN AUTO: 31.5 PG (ref 27–31)
MCHC RBC AUTO-ENTMCNC: 34.5 G/DL (ref 33–37)
MCV RBC AUTO: 91.4 FL (ref 81–99)
MONOCYTES ABSOLUTE: 0.7 K/UL (ref 0–0.9)
MONOCYTES RELATIVE PERCENT: 5.5 % (ref 0–10)
NEUTROPHILS ABSOLUTE: 8.8 K/UL (ref 1.5–7.5)
NEUTROPHILS RELATIVE PERCENT: 66.9 % (ref 50–65)
PDW BLD-RTO: 13.5 % (ref 11.5–14.5)
PLATELET # BLD: 269 K/UL (ref 130–400)
PMV BLD AUTO: 10.9 FL (ref 9.4–12.3)
RBC # BLD: 4.19 M/UL (ref 4.2–5.4)
WBC # BLD: 13.1 K/UL (ref 4.8–10.8)

## 2018-02-25 PROCEDURE — 99284 EMERGENCY DEPT VISIT MOD MDM: CPT

## 2018-02-25 PROCEDURE — 6360000002 HC RX W HCPCS: Performed by: EMERGENCY MEDICINE

## 2018-02-25 PROCEDURE — 96375 TX/PRO/DX INJ NEW DRUG ADDON: CPT

## 2018-02-25 PROCEDURE — 96374 THER/PROPH/DIAG INJ IV PUSH: CPT

## 2018-02-25 RX ORDER — TAMSULOSIN HYDROCHLORIDE 0.4 MG/1
0.4 CAPSULE ORAL DAILY
COMMUNITY
End: 2018-02-26

## 2018-02-25 RX ORDER — MORPHINE SULFATE 4 MG/ML
4 INJECTION, SOLUTION INTRAMUSCULAR; INTRAVENOUS
Status: COMPLETED | OUTPATIENT
Start: 2018-02-25 | End: 2018-02-26

## 2018-02-25 RX ORDER — ONDANSETRON 2 MG/ML
4 INJECTION INTRAMUSCULAR; INTRAVENOUS EVERY 30 MIN PRN
Status: DISCONTINUED | OUTPATIENT
Start: 2018-02-25 | End: 2018-02-26 | Stop reason: HOSPADM

## 2018-02-25 RX ADMIN — ONDANSETRON 4 MG: 2 INJECTION, SOLUTION INTRAMUSCULAR; INTRAVENOUS at 23:48

## 2018-02-25 RX ADMIN — MORPHINE SULFATE 4 MG: 4 INJECTION, SOLUTION INTRAMUSCULAR; INTRAVENOUS at 23:48

## 2018-02-25 ASSESSMENT — PAIN SCALES - GENERAL
PAINLEVEL_OUTOF10: 10
PAINLEVEL_OUTOF10: 10

## 2018-02-25 ASSESSMENT — ENCOUNTER SYMPTOMS
VOICE CHANGE: 0
ABDOMINAL PAIN: 1
BLOOD IN STOOL: 0
SORE THROAT: 0
EYE DISCHARGE: 0
NAUSEA: 0
FACIAL SWELLING: 0
DIARRHEA: 0
APNEA: 0
CONSTIPATION: 0
SINUS PRESSURE: 0
CHOKING: 0

## 2018-02-25 ASSESSMENT — PAIN DESCRIPTION - LOCATION: LOCATION: FLANK

## 2018-02-25 ASSESSMENT — PAIN DESCRIPTION - ORIENTATION: ORIENTATION: RIGHT

## 2018-02-26 ENCOUNTER — PREP FOR PROCEDURE (OUTPATIENT)
Dept: UROLOGY | Age: 33
End: 2018-02-26

## 2018-02-26 ENCOUNTER — APPOINTMENT (OUTPATIENT)
Dept: GENERAL RADIOLOGY | Age: 33
End: 2018-02-26
Payer: COMMERCIAL

## 2018-02-26 ENCOUNTER — APPOINTMENT (OUTPATIENT)
Dept: CT IMAGING | Age: 33
End: 2018-02-26
Payer: COMMERCIAL

## 2018-02-26 ENCOUNTER — OFFICE VISIT (OUTPATIENT)
Dept: UROLOGY | Age: 33
End: 2018-02-26
Payer: COMMERCIAL

## 2018-02-26 VITALS
DIASTOLIC BLOOD PRESSURE: 69 MMHG | WEIGHT: 186 LBS | TEMPERATURE: 98.6 F | BODY MASS INDEX: 30.99 KG/M2 | OXYGEN SATURATION: 93 % | SYSTOLIC BLOOD PRESSURE: 120 MMHG | HEIGHT: 65 IN | HEART RATE: 115 BPM | RESPIRATION RATE: 18 BRPM

## 2018-02-26 VITALS
DIASTOLIC BLOOD PRESSURE: 83 MMHG | BODY MASS INDEX: 30.99 KG/M2 | HEART RATE: 95 BPM | TEMPERATURE: 96.3 F | HEIGHT: 65 IN | WEIGHT: 186 LBS | SYSTOLIC BLOOD PRESSURE: 120 MMHG

## 2018-02-26 DIAGNOSIS — N20.1 RIGHT URETERAL STONE: Primary | ICD-10-CM

## 2018-02-26 DIAGNOSIS — N23 RENAL COLIC ON RIGHT SIDE: ICD-10-CM

## 2018-02-26 LAB
ALBUMIN SERPL-MCNC: 3.8 G/DL (ref 3.5–5.2)
ALP BLD-CCNC: 83 U/L (ref 35–104)
ALT SERPL-CCNC: 33 U/L (ref 5–33)
ANION GAP SERPL CALCULATED.3IONS-SCNC: 15 MMOL/L (ref 7–19)
AST SERPL-CCNC: 22 U/L (ref 5–32)
BACTERIA URINE, POC: NORMAL
BILIRUB SERPL-MCNC: 0.3 MG/DL (ref 0.2–1.2)
BILIRUBIN URINE: 0 MG/DL
BILIRUBIN URINE: ABNORMAL
BLOOD, URINE: ABNORMAL
BLOOD, URINE: POSITIVE
BUN BLDV-MCNC: 10 MG/DL (ref 6–20)
CALCIUM SERPL-MCNC: 8.8 MG/DL (ref 8.6–10)
CASTS URINE, POC: NORMAL
CHLORIDE BLD-SCNC: 105 MMOL/L (ref 98–111)
CLARITY: ABNORMAL
CLARITY: CLEAR
CO2: 20 MMOL/L (ref 22–29)
COLOR: YELLOW
COLOR: YELLOW
CREAT SERPL-MCNC: 0.8 MG/DL (ref 0.5–0.9)
CRYSTALS URINE, POC: NORMAL
EPI CELLS URINE, POC: NORMAL
GFR NON-AFRICAN AMERICAN: >60
GLUCOSE BLD-MCNC: 188 MG/DL (ref 74–109)
GLUCOSE URINE: NEGATIVE MG/DL
GLUCOSE URINE: NORMAL
KETONES, URINE: NEGATIVE
KETONES, URINE: NEGATIVE MG/DL
LEUKOCYTE EST, POC: NORMAL
LEUKOCYTE ESTERASE, URINE: ABNORMAL
NITRITE, URINE: NEGATIVE
NITRITE, URINE: NEGATIVE
PH UA: 6
PH UA: 6 (ref 4.5–8)
POTASSIUM REFLEX MAGNESIUM: 4.1 MMOL/L (ref 3.5–5)
PROTEIN UA: 100 MG/DL
PROTEIN UA: POSITIVE
RBC URINE, POC: NORMAL
SODIUM BLD-SCNC: 140 MMOL/L (ref 136–145)
SPECIFIC GRAVITY UA: 1.02 (ref 1–1.03)
SPECIFIC GRAVITY UA: >=1.03
TOTAL PROTEIN: 7 G/DL (ref 6.6–8.7)
URINE REFLEX TO CULTURE: YES
UROBILINOGEN, URINE: 0.2 E.U./DL
UROBILINOGEN, URINE: NORMAL
WBC URINE, POC: NORMAL
YEAST URINE, POC: NORMAL

## 2018-02-26 PROCEDURE — 80053 COMPREHEN METABOLIC PANEL: CPT

## 2018-02-26 PROCEDURE — 81003 URINALYSIS AUTO W/O SCOPE: CPT

## 2018-02-26 PROCEDURE — 96376 TX/PRO/DX INJ SAME DRUG ADON: CPT

## 2018-02-26 PROCEDURE — 96375 TX/PRO/DX INJ NEW DRUG ADDON: CPT

## 2018-02-26 PROCEDURE — 81001 URINALYSIS AUTO W/SCOPE: CPT | Performed by: PHYSICIAN ASSISTANT

## 2018-02-26 PROCEDURE — 36415 COLL VENOUS BLD VENIPUNCTURE: CPT

## 2018-02-26 PROCEDURE — 6360000002 HC RX W HCPCS: Performed by: EMERGENCY MEDICINE

## 2018-02-26 PROCEDURE — 99284 EMERGENCY DEPT VISIT MOD MDM: CPT | Performed by: EMERGENCY MEDICINE

## 2018-02-26 PROCEDURE — 85025 COMPLETE CBC W/AUTO DIFF WBC: CPT

## 2018-02-26 PROCEDURE — 2580000003 HC RX 258: Performed by: EMERGENCY MEDICINE

## 2018-02-26 PROCEDURE — 99214 OFFICE O/P EST MOD 30 MIN: CPT | Performed by: PHYSICIAN ASSISTANT

## 2018-02-26 PROCEDURE — 84703 CHORIONIC GONADOTROPIN ASSAY: CPT

## 2018-02-26 PROCEDURE — 87086 URINE CULTURE/COLONY COUNT: CPT

## 2018-02-26 PROCEDURE — 74150 CT ABDOMEN W/O CONTRAST: CPT

## 2018-02-26 PROCEDURE — 74018 RADEX ABDOMEN 1 VIEW: CPT

## 2018-02-26 RX ORDER — HYDROMORPHONE HCL 110MG/55ML
1 PATIENT CONTROLLED ANALGESIA SYRINGE INTRAVENOUS EVERY 30 MIN PRN
Status: COMPLETED | OUTPATIENT
Start: 2018-02-26 | End: 2018-02-26

## 2018-02-26 RX ORDER — TAMSULOSIN HYDROCHLORIDE 0.4 MG/1
0.4 CAPSULE ORAL DAILY
Qty: 10 CAPSULE | Refills: 0 | Status: ON HOLD | OUTPATIENT
Start: 2018-02-26 | End: 2018-03-05 | Stop reason: HOSPADM

## 2018-02-26 RX ORDER — SODIUM CHLORIDE 0.9 % (FLUSH) 0.9 %
10 SYRINGE (ML) INJECTION PRN
Status: CANCELLED | OUTPATIENT
Start: 2018-02-26

## 2018-02-26 RX ORDER — HYDROMORPHONE HYDROCHLORIDE 2 MG/1
TABLET ORAL
Qty: 20 TABLET | Refills: 0 | Status: SHIPPED | OUTPATIENT
Start: 2018-02-26 | End: 2018-03-01

## 2018-02-26 RX ORDER — SODIUM CHLORIDE 0.9 % (FLUSH) 0.9 %
10 SYRINGE (ML) INJECTION EVERY 12 HOURS SCHEDULED
Status: CANCELLED | OUTPATIENT
Start: 2018-02-26

## 2018-02-26 RX ORDER — CIPROFLOXACIN 2 MG/ML
400 INJECTION, SOLUTION INTRAVENOUS
Status: CANCELLED | OUTPATIENT
Start: 2018-02-26 | End: 2018-02-26

## 2018-02-26 RX ORDER — 0.9 % SODIUM CHLORIDE 0.9 %
1000 INTRAVENOUS SOLUTION INTRAVENOUS ONCE
Status: COMPLETED | OUTPATIENT
Start: 2018-02-26 | End: 2018-02-26

## 2018-02-26 RX ORDER — ONDANSETRON 8 MG/1
8 TABLET, ORALLY DISINTEGRATING ORAL EVERY 8 HOURS PRN
Qty: 15 TABLET | Refills: 0 | Status: ON HOLD | OUTPATIENT
Start: 2018-02-26 | End: 2018-03-05 | Stop reason: HOSPADM

## 2018-02-26 RX ORDER — HYDROMORPHONE HCL 110MG/55ML
1 PATIENT CONTROLLED ANALGESIA SYRINGE INTRAVENOUS EVERY 30 MIN PRN
Status: DISCONTINUED | OUTPATIENT
Start: 2018-02-26 | End: 2018-02-26 | Stop reason: HOSPADM

## 2018-02-26 RX ADMIN — MORPHINE SULFATE 4 MG: 4 INJECTION, SOLUTION INTRAMUSCULAR; INTRAVENOUS at 00:37

## 2018-02-26 RX ADMIN — SODIUM CHLORIDE 1000 ML: 9 INJECTION, SOLUTION INTRAVENOUS at 01:15

## 2018-02-26 RX ADMIN — HYDROMORPHONE HYDROCHLORIDE 1 MG: 2 INJECTION INTRAMUSCULAR; INTRAVENOUS; SUBCUTANEOUS at 03:20

## 2018-02-26 RX ADMIN — HYDROMORPHONE HYDROCHLORIDE 1 MG: 2 INJECTION INTRAMUSCULAR; INTRAVENOUS; SUBCUTANEOUS at 02:12

## 2018-02-26 RX ADMIN — HYDROMORPHONE HYDROCHLORIDE 1 MG: 2 INJECTION INTRAMUSCULAR; INTRAVENOUS; SUBCUTANEOUS at 01:16

## 2018-02-26 ASSESSMENT — ENCOUNTER SYMPTOMS
SHORTNESS OF BREATH: 0
EYE PAIN: 0
VOMITING: 0
ABDOMINAL PAIN: 0
BACK PAIN: 0
WHEEZING: 0
BLURRED VISION: 0
SINUS PAIN: 0

## 2018-02-26 ASSESSMENT — PAIN SCALES - GENERAL
PAINLEVEL_OUTOF10: 6
PAINLEVEL_OUTOF10: 10
PAINLEVEL_OUTOF10: 10
PAINLEVEL_OUTOF10: 8

## 2018-02-26 NOTE — ED PROVIDER NOTES
[facility]  eMERGENCY dEPARTMENT eNCOUnter      Pt Name: Diane White  MRN: [id number]  [place] 1985  Date of evaluation: 2/25/2018  Provider: Mary Ellen Kasper MD    [address] [place] [address]       Chief Complaint   Patient presents with    Flank Pain         HISTORY OF PRESENT ILLNESS   (Location/Symptom, Timing/Onset, Context/Setting, Quality, Duration, Modifying Factors, Severity)  Note limiting factors. Diane White is a 35 y.o. female who presents to the emergency department Evaluation right flank pain. 27-year-old female with a history of kidney stones. Developed right flank pain radiating around to the right abdomen prostate 2 hours ago. She's taken her routine medications without improvement. She denies fever or chills. She noted some gross hematuria earlier today. She's had 3 episodes of lithotripsy before she sees Dr. Pattie gordon. The history is provided by the patient. Nursing Notes were reviewed. REVIEW OF SYSTEMS    (2-9 systems for level 4, 10 or more for level 5)     Review of Systems   Constitutional: Negative for chills and fever. HENT: Negative for drooling, facial swelling, nosebleeds, sinus pressure, sore throat and voice change. Eyes: Negative for discharge. Respiratory: Negative for apnea and choking. Cardiovascular: Negative for chest pain and leg swelling. Gastrointestinal: Positive for abdominal pain. Negative for blood in stool, constipation, diarrhea and nausea. Genitourinary: Positive for difficulty urinating, flank pain and hematuria. Negative for dysuria and enuresis. Musculoskeletal: Negative for joint swelling. Skin: Negative for rash and wound. Neurological: Positive for headaches. Negative for seizures and syncope. History of the patient   Psychiatric/Behavioral: Negative for behavioral problems, hallucinations and suicidal ideas. All other systems reviewed and are negative.       A complete review of systems was performed the office 1st thing this morning      DISCHARGE MEDICATIONS:  New Prescriptions    HYDROMORPHONE (DILAUDID) 2 MG TABLET    1-2 tablets every 4-6 hours as needed for pain. Ed Teixeira ONDANSETRON (ZOFRAN-ODT) 8 MG DISINTEGRATING TABLET    Take 1 tablet by mouth every 8 hours as needed for Nausea or Vomiting     Attestation: The Prescription Monitoring Report for this patient was reviewed today.  Marcio Gibson #[id number]) Aleshia De La Torre MD)    (Please note that portions of this note were completed with a voice recognition program.  Efforts were made to edit the dictations but occasionally words are mis-transcribed.)    Aleshia De La Torre MD (electronically signed)  Attending Emergency Physician          Aleshia De La Torre MD  02/26/18 [id number]

## 2018-02-26 NOTE — PROGRESS NOTES
exhibits no discharge. No scleral icterus. Neck: No JVD present. No tracheal deviation present. No thyromegaly present. Cardiovascular: Exam reveals no gallop and no friction rub. Pulmonary/Chest: No stridor. She has no rales. Abdominal: She exhibits no distension and no mass. There is no rebound and no guarding. Musculoskeletal: She exhibits no edema. Neurological: No cranial nerve deficit. She exhibits normal muscle tone. Coordination normal.   Skin: She is not diaphoretic. No pallor. DATA:  U/A:    Lab Results   Component Value Date    NITRITE neg 2017    COLORU Yellow 2018    PROTEINU Positive 2018    PHUR 6.0 2018    WBCUA 21 2017    RBCUA >900 2017    RBCUA 224 2015    YEAST 0 2016    BACTERIA NEGATIVE 2017    CLARITYU Clear 2018    SPECGRAV 1.025 2018    LEUKOCYTESUR neg 2018    LEUKOCYTESUR TRACE 2018    UROBILINOGEN Normal 2018    BILIRUBINUR 0 2018    BILIRUBINUR neg 2017    BILIRUBINUR SMALL 2011    BLOODU Positive 2018    GLUCOSEU neg 2018          Imagin. Right ureteral stone  I reviewed the CT scan without contrast as well as a KUB I also discussed with Dr. Andrew Ho who also reviewed the films. There were was a 6 mm right proximal ureteral stone seen on CT scan however it was not easily seen on the KUB in that location. 2 other larger stones are seen in the right kidney. There is a calcification on the right superimposed with the sacrum which is suspicious for this stone. On CT scan there are no other calcifications seen in this location. Most likely is the stone that is migrated to the middle to distal ureter. Patient had been taking ibuprofen up to yesterday she will be scheduled for cystoscopy right ureteroscopy laser lithotripsy and placement of right double-J stent. I gave her strainers she should continue the tamsulosin and take pain medicine as needed. Should she were to need further pain medication and/or runs out of the pain medication she was given at the ER she can contact our office. 2. Renal colic on right side  Per #1      Orders Placed This Encounter   Procedures    CYSTOSCOPY W/ URETEROSCOPY W/ LITHOTRIPSY     And placement of right double-J stent     Standing Status:   Future     Standing Expiration Date:   8/26/2018     Order Specific Question:   Pre-procedure Diagnosis     Answer:   Right ureteral stone and right renal colic    POC URINE with Microscopic     No orders of the defined types were placed in this encounter. Plan:  Patient will be scheduled for right ureteroscopy next Tuesday with Dr. Phoebe Dougherty

## 2018-02-26 NOTE — ED NOTES
ASSESSMENT:    PT ALERT/ORIENTED X4. PUPILS EQUAL/REACTIVE    SKIN:  WARM/DRY PINK CAPILLARY REFILL < 2SECS    CARDIAC:  S1 S2 NOTED     LUNGS: CLEAR UPPER AND LOWER LOBES, RESPIRATIONS EVEN/UNLABORED     ABDOMEN: Pt c/o right flank pain that begins in the back and radiates into the front with dysuria and hematuria. Pt reports that she has hx of kidney stones and that this is the worst she has ever had. EXTREMITIES:  BILATERAL DP AND PT AND NO EDEMA NOTED. NO DISTRESS NOTED. SIDE RAILS UP AND CALL LIGHT IN REACH.      Calderon Tineo, RN  02/25/18 [id number]

## 2018-02-28 ENCOUNTER — HOSPITAL ENCOUNTER (OUTPATIENT)
Dept: GENERAL RADIOLOGY | Age: 33
Discharge: HOME | End: 2018-02-28
Payer: COMMERCIAL

## 2018-02-28 DIAGNOSIS — N97.9 INFERTILITY, FEMALE: ICD-10-CM

## 2018-02-28 LAB — URINE CULTURE, ROUTINE: NORMAL

## 2018-02-28 PROCEDURE — 6360000004 HC RX CONTRAST MEDICATION: Performed by: OBSTETRICS & GYNECOLOGY

## 2018-02-28 PROCEDURE — 74740 X-RAY FEMALE GENITAL TRACT: CPT

## 2018-02-28 RX ADMIN — IOPAMIDOL 20 ML: 408 INJECTION, SOLUTION INTRATHECAL at 09:23

## 2018-03-01 ENCOUNTER — TELEPHONE (OUTPATIENT)
Dept: UROLOGY | Age: 33
End: 2018-03-01

## 2018-03-04 ENCOUNTER — HOSPITAL ENCOUNTER (INPATIENT)
Age: 33
LOS: 1 days | Discharge: HOME | DRG: 694 | End: 2018-03-05
Attending: EMERGENCY MEDICINE | Admitting: UROLOGY
Payer: COMMERCIAL

## 2018-03-04 ENCOUNTER — APPOINTMENT (OUTPATIENT)
Dept: CT IMAGING | Age: 33
DRG: 694 | End: 2018-03-04
Payer: COMMERCIAL

## 2018-03-04 DIAGNOSIS — N20.1 RIGHT URETERAL STONE: Primary | ICD-10-CM

## 2018-03-04 DIAGNOSIS — R52 INADEQUATE PAIN CONTROL: ICD-10-CM

## 2018-03-04 PROBLEM — N23 RENAL COLIC ON RIGHT SIDE: Status: ACTIVE | Noted: 2018-03-04

## 2018-03-04 PROBLEM — N20.0 RENAL CALCULUS, RIGHT: Status: ACTIVE | Noted: 2018-03-04

## 2018-03-04 LAB
ALBUMIN SERPL-MCNC: 4.2 G/DL (ref 3.5–5.2)
ALP BLD-CCNC: 79 U/L (ref 35–104)
ALT SERPL-CCNC: 33 U/L (ref 5–33)
ANION GAP SERPL CALCULATED.3IONS-SCNC: 13 MMOL/L (ref 7–19)
AST SERPL-CCNC: 23 U/L (ref 5–32)
BASOPHILS ABSOLUTE: 0.1 K/UL (ref 0–0.2)
BASOPHILS RELATIVE PERCENT: 0.5 % (ref 0–1)
BILIRUB SERPL-MCNC: 0.3 MG/DL (ref 0.2–1.2)
BILIRUBIN URINE: ABNORMAL
BLOOD, URINE: ABNORMAL
BUN BLDV-MCNC: 11 MG/DL (ref 6–20)
CALCIUM SERPL-MCNC: 9.2 MG/DL (ref 8.6–10)
CHLORIDE BLD-SCNC: 106 MMOL/L (ref 98–111)
CLARITY: CLEAR
CO2: 22 MMOL/L (ref 22–29)
COLOR: YELLOW
CREAT SERPL-MCNC: 1 MG/DL (ref 0.5–0.9)
EOSINOPHILS ABSOLUTE: 0.4 K/UL (ref 0–0.6)
EOSINOPHILS RELATIVE PERCENT: 3.6 % (ref 0–5)
GFR NON-AFRICAN AMERICAN: >60
GLUCOSE BLD-MCNC: 154 MG/DL (ref 74–109)
GLUCOSE URINE: NEGATIVE MG/DL
HCG QUALITATIVE: NEGATIVE
HCT VFR BLD CALC: 38.7 % (ref 37–47)
HEMOGLOBIN: 13.5 G/DL (ref 12–16)
KETONES, URINE: NEGATIVE MG/DL
LACTIC ACID: 1.4 MMOL/L (ref 0.5–1.9)
LEUKOCYTE ESTERASE, URINE: ABNORMAL
LYMPHOCYTES ABSOLUTE: 2.2 K/UL (ref 1.1–4.5)
LYMPHOCYTES RELATIVE PERCENT: 19.6 % (ref 20–40)
MCH RBC QN AUTO: 31.4 PG (ref 27–31)
MCHC RBC AUTO-ENTMCNC: 34.9 G/DL (ref 33–37)
MCV RBC AUTO: 90 FL (ref 81–99)
MONOCYTES ABSOLUTE: 0.8 K/UL (ref 0–0.9)
MONOCYTES RELATIVE PERCENT: 6.7 % (ref 0–10)
NEUTROPHILS ABSOLUTE: 7.7 K/UL (ref 1.5–7.5)
NEUTROPHILS RELATIVE PERCENT: 67.8 % (ref 50–65)
NITRITE, URINE: NEGATIVE
PDW BLD-RTO: 13.2 % (ref 11.5–14.5)
PH UA: 5.5
PLATELET # BLD: 275 K/UL (ref 130–400)
PMV BLD AUTO: 10.6 FL (ref 9.4–12.3)
POTASSIUM SERPL-SCNC: 3.7 MMOL/L (ref 3.5–5)
PROTEIN UA: 30 MG/DL
RBC # BLD: 4.3 M/UL (ref 4.2–5.4)
SODIUM BLD-SCNC: 141 MMOL/L (ref 136–145)
SPECIFIC GRAVITY UA: >=1.03
TOTAL PROTEIN: 7.3 G/DL (ref 6.6–8.7)
URINE REFLEX TO CULTURE: YES
UROBILINOGEN, URINE: 0.2 E.U./DL
WBC # BLD: 11.3 K/UL (ref 4.8–10.8)

## 2018-03-04 PROCEDURE — 80053 COMPREHEN METABOLIC PANEL: CPT

## 2018-03-04 PROCEDURE — 83605 ASSAY OF LACTIC ACID: CPT

## 2018-03-04 PROCEDURE — 96374 THER/PROPH/DIAG INJ IV PUSH: CPT

## 2018-03-04 PROCEDURE — 96376 TX/PRO/DX INJ SAME DRUG ADON: CPT

## 2018-03-04 PROCEDURE — 74150 CT ABDOMEN W/O CONTRAST: CPT

## 2018-03-04 PROCEDURE — 85025 COMPLETE CBC W/AUTO DIFF WBC: CPT

## 2018-03-04 PROCEDURE — 6360000002 HC RX W HCPCS: Performed by: NURSE PRACTITIONER

## 2018-03-04 PROCEDURE — 2580000003 HC RX 258: Performed by: NURSE PRACTITIONER

## 2018-03-04 PROCEDURE — 99222 1ST HOSP IP/OBS MODERATE 55: CPT | Performed by: UROLOGY

## 2018-03-04 PROCEDURE — 6370000000 HC RX 637 (ALT 250 FOR IP): Performed by: UROLOGY

## 2018-03-04 PROCEDURE — 94664 DEMO&/EVAL PT USE INHALER: CPT

## 2018-03-04 PROCEDURE — 6360000002 HC RX W HCPCS: Performed by: UROLOGY

## 2018-03-04 PROCEDURE — 84703 CHORIONIC GONADOTROPIN ASSAY: CPT

## 2018-03-04 PROCEDURE — 87086 URINE CULTURE/COLONY COUNT: CPT

## 2018-03-04 PROCEDURE — 99285 EMERGENCY DEPT VISIT HI MDM: CPT | Performed by: EMERGENCY MEDICINE

## 2018-03-04 PROCEDURE — 1210000000 HC MED SURG R&B

## 2018-03-04 PROCEDURE — 36415 COLL VENOUS BLD VENIPUNCTURE: CPT

## 2018-03-04 PROCEDURE — 2580000003 HC RX 258: Performed by: UROLOGY

## 2018-03-04 PROCEDURE — 81003 URINALYSIS AUTO W/O SCOPE: CPT

## 2018-03-04 PROCEDURE — 99285 EMERGENCY DEPT VISIT HI MDM: CPT

## 2018-03-04 PROCEDURE — 96375 TX/PRO/DX INJ NEW DRUG ADDON: CPT

## 2018-03-04 RX ORDER — KETOROLAC TROMETHAMINE 30 MG/ML
30 INJECTION, SOLUTION INTRAMUSCULAR; INTRAVENOUS EVERY 8 HOURS
Status: DISCONTINUED | OUTPATIENT
Start: 2018-03-04 | End: 2018-03-05 | Stop reason: HOSPADM

## 2018-03-04 RX ORDER — MORPHINE SULFATE 4 MG/ML
2 INJECTION, SOLUTION INTRAMUSCULAR; INTRAVENOUS
Status: DISCONTINUED | OUTPATIENT
Start: 2018-03-04 | End: 2018-03-05 | Stop reason: HOSPADM

## 2018-03-04 RX ORDER — HYDROMORPHONE HCL 110MG/55ML
1 PATIENT CONTROLLED ANALGESIA SYRINGE INTRAVENOUS ONCE
Status: COMPLETED | OUTPATIENT
Start: 2018-03-04 | End: 2018-03-04

## 2018-03-04 RX ORDER — KETOROLAC TROMETHAMINE 30 MG/ML
30 INJECTION, SOLUTION INTRAMUSCULAR; INTRAVENOUS ONCE
Status: COMPLETED | OUTPATIENT
Start: 2018-03-04 | End: 2018-03-04

## 2018-03-04 RX ORDER — 0.9 % SODIUM CHLORIDE 0.9 %
1000 INTRAVENOUS SOLUTION INTRAVENOUS ONCE
Status: COMPLETED | OUTPATIENT
Start: 2018-03-04 | End: 2018-03-04

## 2018-03-04 RX ORDER — ONDANSETRON 2 MG/ML
4 INJECTION INTRAMUSCULAR; INTRAVENOUS ONCE
Status: COMPLETED | OUTPATIENT
Start: 2018-03-04 | End: 2018-03-04

## 2018-03-04 RX ORDER — OXYCODONE HYDROCHLORIDE AND ACETAMINOPHEN 5; 325 MG/1; MG/1
2 TABLET ORAL EVERY 4 HOURS PRN
Status: DISCONTINUED | OUTPATIENT
Start: 2018-03-04 | End: 2018-03-05 | Stop reason: HOSPADM

## 2018-03-04 RX ORDER — ACETAMINOPHEN 325 MG/1
650 TABLET ORAL EVERY 4 HOURS PRN
Status: DISCONTINUED | OUTPATIENT
Start: 2018-03-04 | End: 2018-03-05 | Stop reason: HOSPADM

## 2018-03-04 RX ORDER — SODIUM CHLORIDE 9 MG/ML
INJECTION, SOLUTION INTRAVENOUS CONTINUOUS
Status: DISCONTINUED | OUTPATIENT
Start: 2018-03-04 | End: 2018-03-05 | Stop reason: HOSPADM

## 2018-03-04 RX ORDER — ONDANSETRON 2 MG/ML
4 INJECTION INTRAMUSCULAR; INTRAVENOUS EVERY 8 HOURS PRN
Status: DISCONTINUED | OUTPATIENT
Start: 2018-03-04 | End: 2018-03-04 | Stop reason: SDUPTHER

## 2018-03-04 RX ORDER — CITALOPRAM 40 MG/1
40 TABLET ORAL DAILY
Status: DISCONTINUED | OUTPATIENT
Start: 2018-03-04 | End: 2018-03-05 | Stop reason: HOSPADM

## 2018-03-04 RX ORDER — TOPIRAMATE 100 MG/1
100 TABLET, FILM COATED ORAL 2 TIMES DAILY
Status: DISCONTINUED | OUTPATIENT
Start: 2018-03-04 | End: 2018-03-05 | Stop reason: HOSPADM

## 2018-03-04 RX ORDER — OXYCODONE HYDROCHLORIDE AND ACETAMINOPHEN 5; 325 MG/1; MG/1
1 TABLET ORAL EVERY 4 HOURS PRN
Status: DISCONTINUED | OUTPATIENT
Start: 2018-03-04 | End: 2018-03-05 | Stop reason: HOSPADM

## 2018-03-04 RX ORDER — TAMSULOSIN HYDROCHLORIDE 0.4 MG/1
0.4 CAPSULE ORAL DAILY
Status: DISCONTINUED | OUTPATIENT
Start: 2018-03-04 | End: 2018-03-05 | Stop reason: HOSPADM

## 2018-03-04 RX ORDER — SODIUM CHLORIDE 0.9 % (FLUSH) 0.9 %
10 SYRINGE (ML) INJECTION EVERY 12 HOURS SCHEDULED
Status: DISCONTINUED | OUTPATIENT
Start: 2018-03-04 | End: 2018-03-05 | Stop reason: HOSPADM

## 2018-03-04 RX ORDER — ACETAMINOPHEN 325 MG/1
650 TABLET ORAL EVERY 4 HOURS PRN
Status: DISCONTINUED | OUTPATIENT
Start: 2018-03-04 | End: 2018-03-04 | Stop reason: SDUPTHER

## 2018-03-04 RX ORDER — HYDROMORPHONE HYDROCHLORIDE 2 MG/1
2 TABLET ORAL EVERY 4 HOURS PRN
Status: ON HOLD | COMMUNITY
End: 2018-03-05 | Stop reason: HOSPADM

## 2018-03-04 RX ORDER — ONDANSETRON 2 MG/ML
4 INJECTION INTRAMUSCULAR; INTRAVENOUS EVERY 4 HOURS PRN
Status: DISCONTINUED | OUTPATIENT
Start: 2018-03-04 | End: 2018-03-05 | Stop reason: HOSPADM

## 2018-03-04 RX ORDER — MORPHINE SULFATE 4 MG/ML
4 INJECTION, SOLUTION INTRAMUSCULAR; INTRAVENOUS
Status: DISCONTINUED | OUTPATIENT
Start: 2018-03-04 | End: 2018-03-05 | Stop reason: HOSPADM

## 2018-03-04 RX ORDER — SODIUM CHLORIDE 0.9 % (FLUSH) 0.9 %
10 SYRINGE (ML) INJECTION PRN
Status: DISCONTINUED | OUTPATIENT
Start: 2018-03-04 | End: 2018-03-05 | Stop reason: HOSPADM

## 2018-03-04 RX ORDER — MORPHINE SULFATE 4 MG/ML
4 INJECTION, SOLUTION INTRAMUSCULAR; INTRAVENOUS ONCE
Status: COMPLETED | OUTPATIENT
Start: 2018-03-04 | End: 2018-03-04

## 2018-03-04 RX ADMIN — Medication 4 MG: at 18:51

## 2018-03-04 RX ADMIN — CITALOPRAM HYDROBROMIDE 40 MG: 40 TABLET ORAL at 18:51

## 2018-03-04 RX ADMIN — KETOROLAC TROMETHAMINE 30 MG: 30 INJECTION, SOLUTION INTRAMUSCULAR at 12:55

## 2018-03-04 RX ADMIN — ENOXAPARIN SODIUM 40 MG: 40 INJECTION SUBCUTANEOUS at 18:51

## 2018-03-04 RX ADMIN — ONDANSETRON 4 MG: 2 INJECTION INTRAMUSCULAR; INTRAVENOUS at 10:33

## 2018-03-04 RX ADMIN — HYDROMORPHONE HYDROCHLORIDE 1 MG: 2 INJECTION INTRAMUSCULAR; INTRAVENOUS; SUBCUTANEOUS at 12:10

## 2018-03-04 RX ADMIN — TAMSULOSIN HYDROCHLORIDE 0.4 MG: 0.4 CAPSULE ORAL at 18:51

## 2018-03-04 RX ADMIN — ONDANSETRON 4 MG: 2 INJECTION INTRAMUSCULAR; INTRAVENOUS at 12:54

## 2018-03-04 RX ADMIN — Medication 4 MG: at 10:35

## 2018-03-04 RX ADMIN — TOPIRAMATE 100 MG: 100 TABLET, FILM COATED ORAL at 20:20

## 2018-03-04 RX ADMIN — SODIUM CHLORIDE: 9 INJECTION, SOLUTION INTRAVENOUS at 15:24

## 2018-03-04 RX ADMIN — KETOROLAC TROMETHAMINE 30 MG: 30 INJECTION, SOLUTION INTRAMUSCULAR at 20:20

## 2018-03-04 RX ADMIN — SODIUM CHLORIDE 1000 ML: 9 INJECTION, SOLUTION INTRAVENOUS at 12:54

## 2018-03-04 RX ADMIN — SODIUM CHLORIDE: 9 INJECTION, SOLUTION INTRAVENOUS at 18:51

## 2018-03-04 ASSESSMENT — PAIN SCALES - GENERAL
PAINLEVEL_OUTOF10: 7
PAINLEVEL_OUTOF10: 5
PAINLEVEL_OUTOF10: 10
PAINLEVEL_OUTOF10: 3
PAINLEVEL_OUTOF10: 6
PAINLEVEL_OUTOF10: 10
PAINLEVEL_OUTOF10: 3
PAINLEVEL_OUTOF10: 3

## 2018-03-04 ASSESSMENT — ENCOUNTER SYMPTOMS
ABDOMINAL PAIN: 1
COUGH: 0
SHORTNESS OF BREATH: 0
EYE DISCHARGE: 0
NAUSEA: 1
BACK PAIN: 0
WHEEZING: 0
VOMITING: 0
SORE THROAT: 0
DIARRHEA: 0

## 2018-03-04 ASSESSMENT — PAIN DESCRIPTION - LOCATION
LOCATION: ABDOMEN

## 2018-03-04 ASSESSMENT — PAIN DESCRIPTION - ORIENTATION
ORIENTATION: LOWER

## 2018-03-04 NOTE — ED PROVIDER NOTES
[facility]  eMERGENCY dEPARTMENT eNCOUnter      Pt Name: Eva Hernadez  MRN: [id number]  [place] 1985  Date of evaluation: 3/4/2018  Provider: MARIO Maxwell    CHIEF COMPLAINT       Chief Complaint   Patient presents with    Abdominal Pain         HISTORY OF PRESENT ILLNESS  (Location/Symptom, Timing/Onset, Context/Setting, Quality, Duration, Modifying Factors, Severity.)   Eva Hernadez is a 35 y.o. female who presents to the emergency department With chief complaint of right-sided flank pain. Patient states she is a known 6 mm obstructing stone in the right kidney she was diagnosed in our ER last week. She has followed up with urology and they're planning on lithotripsy on Tuesday however the patient states her pain is not controlled and her urine has decreased. She states she has had some chills however she denies any fevers. She has nauseous however she denies any vomiting     The history is provided by the patient. Nursing Notes were reviewed and I agree. REVIEW OF SYSTEMS    (2-9 systems for level 4, 10 or more for level 5)     Review of Systems   Constitutional: Negative for chills and fever. HENT: Negative for congestion, ear pain and sore throat. Eyes: Negative for discharge. Respiratory: Negative for cough, shortness of breath and wheezing. Cardiovascular: Negative for chest pain and palpitations. Gastrointestinal: Positive for abdominal pain (Right-sided flank pain) and nausea. Negative for diarrhea and vomiting. Genitourinary: Positive for difficulty urinating. Negative for dysuria, frequency, hematuria and urgency. Musculoskeletal: Negative for back pain and neck pain. Skin: Negative for rash. Neurological: Negative for dizziness and headaches. Except as noted above the remainder of the review of systems was reviewed and negative.        PAST MEDICAL HISTORY     Past Medical History:   Diagnosis Date    Fatigue     Fever     GERD Final Result   1. Stable right-sided obstructive uropathy related to a 6 mm stone,   which has migrated into the distal right ureter, approximately 4 cm   from the UVJ. There are additional nonobstructing nephrolithiasis   within both kidneys. 2. Evidence of previous cholecystectomy, there is diffuse fatty   infiltration of the liver. 3. A ventriculoperitoneal shunt catheter is identified with tip in the   deep central pelvis. There is a small amount of free fluid in the   pelvis. No free air is identified. 4. Small fat-containing periumbilical hernia measuring approximately   1.5 cm. Signed by Dr Natty Rosales on 3/4/2018 11:27 AM          LABS:  Labs Reviewed   CBC WITH AUTO DIFFERENTIAL - Abnormal; Notable for the following:        Result Value    WBC 11.3 (*)     MCH 31.4 (*)     Neutrophils % 67.8 (*)     Lymphocytes % 19.6 (*)     Neutrophils # 7.7 (*)     All other components within normal limits   COMPREHENSIVE METABOLIC PANEL - Abnormal; Notable for the following:     Glucose 154 (*)     CREATININE 1.0 (*)     All other components within normal limits   URINE RT REFLEX TO CULTURE - Abnormal; Notable for the following:     Bilirubin Urine SMALL (*)     Blood, Urine MODERATE (*)     Protein, UA 30 (*)     Leukocyte Esterase, Urine SMALL (*)     All other components within normal limits    Narrative:     Specimen quantity not sufficient for urine microscopic evaluation   URINE CULTURE   LACTIC ACID, PLASMA   HCG, SERUM, QUALITATIVE       All other labs were within normal range or not returned as of this dictation.     RE-ASSESSMENT          EMERGENCY DEPARTMENT COURSE and DIFFERENTIAL DIAGNOSIS/MDM:   Vitals:    Vitals:    03/04/18 1214 03/04/18 1414 03/04/18 1430 03/04/18 1508   BP: 132/84 118/78 120/86 112/81   Pulse: 107 103 102 97   Resp: 18 18 17 17   Temp:   97.3 °F (36.3 °C) 97.9 °F (36.6 °C)   TempSrc:   Temporal Temporal   SpO2: 94% 94% 96% 96%   Weight:       Height:

## 2018-03-05 ENCOUNTER — APPOINTMENT (OUTPATIENT)
Dept: CT IMAGING | Age: 33
DRG: 694 | End: 2018-03-05
Payer: COMMERCIAL

## 2018-03-05 VITALS
HEART RATE: 82 BPM | DIASTOLIC BLOOD PRESSURE: 66 MMHG | HEIGHT: 64 IN | RESPIRATION RATE: 17 BRPM | SYSTOLIC BLOOD PRESSURE: 97 MMHG | BODY MASS INDEX: 31.76 KG/M2 | OXYGEN SATURATION: 96 % | TEMPERATURE: 98.6 F | WEIGHT: 186 LBS

## 2018-03-05 PROBLEM — N20.1 RIGHT URETERAL STONE: Status: RESOLVED | Noted: 2018-03-04 | Resolved: 2018-03-05

## 2018-03-05 PROBLEM — N23 RENAL COLIC ON RIGHT SIDE: Status: RESOLVED | Noted: 2018-03-04 | Resolved: 2018-03-05

## 2018-03-05 LAB
BILIRUBIN URINE: NEGATIVE
BLOOD, URINE: ABNORMAL
CLARITY: ABNORMAL
COLOR: YELLOW
CRYSTALS, UA: ABNORMAL /HPF
EPITHELIAL CELLS, UA: ABNORMAL /HPF
GLUCOSE URINE: NEGATIVE MG/DL
KETONES, URINE: NEGATIVE MG/DL
LEUKOCYTE ESTERASE, URINE: ABNORMAL
NITRITE, URINE: NEGATIVE
PH UA: 6
PROTEIN UA: NEGATIVE MG/DL
RBC UA: ABNORMAL /HPF (ref 0–2)
SPECIFIC GRAVITY UA: 1.02
URINE REFLEX TO CULTURE: YES
UROBILINOGEN, URINE: 1 E.U./DL
WBC UA: ABNORMAL /HPF (ref 0–5)

## 2018-03-05 PROCEDURE — 74150 CT ABDOMEN W/O CONTRAST: CPT

## 2018-03-05 PROCEDURE — 99232 SBSQ HOSP IP/OBS MODERATE 35: CPT | Performed by: UROLOGY

## 2018-03-05 PROCEDURE — 99238 HOSP IP/OBS DSCHRG MGMT 30/<: CPT | Performed by: PHYSICIAN ASSISTANT

## 2018-03-05 PROCEDURE — 82360 CALCULUS ASSAY QUANT: CPT

## 2018-03-05 PROCEDURE — 88300 SURGICAL PATH GROSS: CPT

## 2018-03-05 PROCEDURE — 51798 US URINE CAPACITY MEASURE: CPT

## 2018-03-05 PROCEDURE — 81001 URINALYSIS AUTO W/SCOPE: CPT

## 2018-03-05 PROCEDURE — 6360000002 HC RX W HCPCS: Performed by: UROLOGY

## 2018-03-05 PROCEDURE — 6370000000 HC RX 637 (ALT 250 FOR IP): Performed by: UROLOGY

## 2018-03-05 PROCEDURE — 87086 URINE CULTURE/COLONY COUNT: CPT

## 2018-03-05 RX ORDER — CIPROFLOXACIN 2 MG/ML
400 INJECTION, SOLUTION INTRAVENOUS
Status: DISCONTINUED | OUTPATIENT
Start: 2018-03-05 | End: 2018-03-05 | Stop reason: HOSPADM

## 2018-03-05 RX ORDER — CIPROFLOXACIN 2 MG/ML
400 INJECTION, SOLUTION INTRAVENOUS
Qty: 200 ML | Refills: 0 | Status: SHIPPED | OUTPATIENT
Start: 2018-03-05 | End: 2018-03-05 | Stop reason: HOSPADM

## 2018-03-05 RX ADMIN — ACETAMINOPHEN 650 MG: 325 TABLET, FILM COATED ORAL at 08:05

## 2018-03-05 RX ADMIN — Medication 4 MG: at 00:43

## 2018-03-05 RX ADMIN — TAMSULOSIN HYDROCHLORIDE 0.4 MG: 0.4 CAPSULE ORAL at 08:05

## 2018-03-05 RX ADMIN — TOPIRAMATE 100 MG: 100 TABLET, FILM COATED ORAL at 08:05

## 2018-03-05 RX ADMIN — CITALOPRAM HYDROBROMIDE 40 MG: 40 TABLET ORAL at 08:05

## 2018-03-05 RX ADMIN — KETOROLAC TROMETHAMINE 30 MG: 30 INJECTION, SOLUTION INTRAMUSCULAR at 04:56

## 2018-03-05 ASSESSMENT — ENCOUNTER SYMPTOMS
NAUSEA: 1
VOMITING: 0
ABDOMINAL PAIN: 1
RESPIRATORY NEGATIVE: 1
EYES NEGATIVE: 1

## 2018-03-05 ASSESSMENT — PAIN DESCRIPTION - ORIENTATION
ORIENTATION: RIGHT
ORIENTATION: RIGHT

## 2018-03-05 ASSESSMENT — PAIN DESCRIPTION - LOCATION
LOCATION: FLANK
LOCATION: FLANK

## 2018-03-05 ASSESSMENT — PAIN SCALES - GENERAL
PAINLEVEL_OUTOF10: 3
PAINLEVEL_OUTOF10: 3
PAINLEVEL_OUTOF10: 5
PAINLEVEL_OUTOF10: 6

## 2018-03-05 ASSESSMENT — PAIN DESCRIPTION - PAIN TYPE
TYPE: ACUTE PAIN
TYPE: ACUTE PAIN

## 2018-03-05 NOTE — PROGRESS NOTES
Pt attempted to void after it was explained to her that if she was unable to void and had over 300 cc in her bladder, there was a Dr. June Carey for in and out cath. Pt again unable to void. I/o cath performed using aseptic technique. 350cc of clear ashleigh urine observed. Urine was strained as ordered, but no stone observed. Pt hernan. Well.

## 2018-03-05 NOTE — H&P
relief and she is now  admitted with right renal colic with intractable pain and nausea and for  definitive urologic intervention. She is having no fevers or chills. No  compromising renal function. No dysuria or hematuria. PAST MEDICAL HISTORY:  Remarkable for gastroesophageal reflux disease,  headaches and pseudotumor. She has had breast implant. She had  cholecystectomy. She has had retrograde ureteroscopy in July. She has  passed stones. She has had a ventriculoperitoneal shunt secondary to  hydrocephalus. MEDICATIONS:  1. Dilaudid tablet. 2.  Flomax. 3.  Zofran tablet. 4.  Celexa. 5.  Topamax. ALLERGIES:  No known drug allergies. FAMILY HISTORY:  Remarkable for hypertension, high cholesterol and heart  disease. SOCIAL HISTORY:  She is . She is a former smoker. She does not use  alcohol. She does use a nicotine vapor. REVIEW OF SYSTEMS:  GENERAL:  She has a history of headaches, but currently, no headaches now. No fevers or chills. She has had some weight gain. HEENT:  She denies any difficulty with swallowing. Again, no headaches, no  seizures and no hearing loss. Eyes negative. RESPIRATORY:  Negative. No cough. No shortness of breath. No sore throat  or upper respiratory symptoms. CHEST:  Negative. No chest pain or palpitations. No claudication. GI:  Remarkable for nausea. No diarrhea, constipation or blood in stool. Abdominal pain as described above. :  Maybe some pressure in the bladder, but no dysuria, frequency, urgency  or hematuria. MUSCULOSKELETAL:  Negative. SKIN:  Negative. NEUROLOGIC:  Negative. ENDOCRINE:  Negative. PSYCH:  Negative. PHYSICAL EXAMINATION:  GENERAL:  She is awake, alert, oriented, currently appears comfortable. VITAL SIGNS:  She had been afebrile, temperature is 97.9, respirations 17,  pulse 97 and blood pressure 112/81. HEENT EXAM:  Head is normocephalic, atraumatic. Pupils equal.  Sclerae  clear.   She has

## 2018-03-05 NOTE — DISCHARGE SUMMARY
DISCHARGE SUMMARY NOTE:      Patient Identification  Priscilla Nath is a 35 y.o. female. :  1985  Admit Date:  3/4/2018    Discharge date:  2018                                  Disposition: home    Discharge Diagnoses:   Patient Active Problem List   Diagnosis    IBS (irritable bowel syndrome)    Renal colic    Right ureteral calculus    Right ureteral stone    Renal calculus, right    Renal colic on right side         Consults: none    Procedures: None. Patient was scheduled for ureteroscopy but passed stone today. Patient condition on discharge:  Stable    Patient Instructions: Activity:Ok to resume normal activity  Diet: As tolerated  Follow-up with 2018 with KUB  Patient needs to not take any Aspirin, Aleve, Naprosyn, ibuprofen, or any Excedrin Migraine. Hospital course: Patient is a 41-year-old female who I had seen in our office for every 2018 with symptoms of right renal colic. She had a right proximal stone measured at 6 mm however on KUB the stone had migrated down over the sacrum. Her pain that time was reasonably controlled. She was scheduled for right ureteroscopy laser lithotripsy placement of right double-J stent which was scheduled tomorrow 18. Unfortunately she presented to the emergency room 18 complaining of severe right-sided lower abdominal pain. She was admitted and seen by Dr. Jocelynn Cruz. She had apparently passed one stone but the 6 mm stone was still present in the right mid ureter with hydro. Her pain was not well-controlled since she was admitted and was on the schedule tomorrow 18 for ureteroscopy. Today she passed the stone which was By the nurse in CT scan done reveals the 6 mm stone has passed and there is no further hydronephrosis or obstruction shown.  There are stones seen on KUB on the right which would be amenable to ESWL she has been on Toradol so will not be able to have ESWL will tomorrow we will try to get her on the schedule next week. Her difficulty voiding is also resolved she does not need in out cath at this time and her pain has been much resolved. It was felt the patient was stable for discharge she will not need ureteroscopy tomorrow. I would like to see her in the office Monday, 02/12/18 with KUB and we'll get her on the schedule for ESWL next week.

## 2018-03-06 ENCOUNTER — HOSPITAL ENCOUNTER (OUTPATIENT)
Age: 33
Setting detail: OUTPATIENT SURGERY
End: 2018-03-06
Attending: UROLOGY | Admitting: UROLOGY
Payer: COMMERCIAL

## 2018-03-06 LAB — URINE CULTURE, ROUTINE: NORMAL

## 2018-03-07 LAB — URINE CULTURE, ROUTINE: NORMAL

## 2018-03-09 LAB
CALCULI COMPOSITION: NORMAL
MASS: 74 MG
STONE DESCRIPTION: NORMAL
STONE NUMBER: 1
STONE SIZE: NORMAL MM

## 2018-03-12 ENCOUNTER — OFFICE VISIT (OUTPATIENT)
Dept: UROLOGY | Age: 33
End: 2018-03-12
Payer: COMMERCIAL

## 2018-03-12 ENCOUNTER — HOSPITAL ENCOUNTER (OUTPATIENT)
Dept: GENERAL RADIOLOGY | Age: 33
Discharge: HOME | End: 2018-03-12
Payer: COMMERCIAL

## 2018-03-12 ENCOUNTER — HOSPITAL ENCOUNTER (OUTPATIENT)
Dept: PREADMISSION TESTING | Age: 33
Discharge: HOME | End: 2018-03-16
Payer: COMMERCIAL

## 2018-03-12 VITALS — WEIGHT: 186 LBS | BODY MASS INDEX: 31.76 KG/M2 | HEIGHT: 64 IN

## 2018-03-12 VITALS — TEMPERATURE: 96.3 F

## 2018-03-12 DIAGNOSIS — N20.0 KIDNEY STONES: ICD-10-CM

## 2018-03-12 DIAGNOSIS — N20.1 RIGHT URETERAL CALCULUS: ICD-10-CM

## 2018-03-12 DIAGNOSIS — N20.1 RIGHT URETERAL STONE: Primary | ICD-10-CM

## 2018-03-12 LAB
ANION GAP SERPL CALCULATED.3IONS-SCNC: 15 MMOL/L (ref 7–19)
APTT: 23.4 SEC (ref 26–36.2)
BASOPHILS ABSOLUTE: 0.1 K/UL (ref 0–0.2)
BASOPHILS RELATIVE PERCENT: 0.6 % (ref 0–1)
BUN BLDV-MCNC: 8 MG/DL (ref 6–20)
CALCIUM SERPL-MCNC: 8.9 MG/DL (ref 8.6–10)
CHLORIDE BLD-SCNC: 110 MMOL/L (ref 98–111)
CO2: 19 MMOL/L (ref 22–29)
COLLAGEN ADENOSINE-5'-DIPHOSPHATE (ADP) TIME: 65 SEC (ref 51–124)
COLLAGEN EPINEPHRINE TIME: >300 SEC (ref 84–176)
CREAT SERPL-MCNC: 0.8 MG/DL (ref 0.5–0.9)
EKG P AXIS: 7 DEGREES
EKG P-R INTERVAL: 166 MS
EKG Q-T INTERVAL: 392 MS
EKG QRS DURATION: 80 MS
EKG QTC CALCULATION (BAZETT): 421 MS
EKG T AXIS: 11 DEGREES
EOSINOPHILS ABSOLUTE: 0.3 K/UL (ref 0–0.6)
EOSINOPHILS RELATIVE PERCENT: 2.1 % (ref 0–5)
GFR NON-AFRICAN AMERICAN: >60
GLUCOSE BLD-MCNC: 93 MG/DL (ref 74–109)
HCG(URINE) PREGNANCY TEST: NEGATIVE
HCT VFR BLD CALC: 41.5 % (ref 37–47)
HEMOGLOBIN: 14.2 G/DL (ref 12–16)
INR BLD: 0.91 (ref 0.88–1.18)
LYMPHOCYTES ABSOLUTE: 2.2 K/UL (ref 1.1–4.5)
LYMPHOCYTES RELATIVE PERCENT: 18.6 % (ref 20–40)
MCH RBC QN AUTO: 31.2 PG (ref 27–31)
MCHC RBC AUTO-ENTMCNC: 34.2 G/DL (ref 33–37)
MCV RBC AUTO: 91.2 FL (ref 81–99)
MONOCYTES ABSOLUTE: 0.6 K/UL (ref 0–0.9)
MONOCYTES RELATIVE PERCENT: 5 % (ref 0–10)
NEUTROPHILS ABSOLUTE: 8.5 K/UL (ref 1.5–7.5)
NEUTROPHILS RELATIVE PERCENT: 71.8 % (ref 50–65)
PDW BLD-RTO: 13.3 % (ref 11.5–14.5)
PLATELET # BLD: 228 K/UL (ref 130–400)
PLATELET FUNCTION INTERPRETATION: ABNORMAL
PMV BLD AUTO: 12 FL (ref 9.4–12.3)
POTASSIUM SERPL-SCNC: 3.9 MMOL/L (ref 3.5–5)
PROTHROMBIN TIME: 12.2 SEC (ref 12–14.6)
RBC # BLD: 4.55 M/UL (ref 4.2–5.4)
SODIUM BLD-SCNC: 144 MMOL/L (ref 136–145)
WBC # BLD: 11.9 K/UL (ref 4.8–10.8)

## 2018-03-12 PROCEDURE — 99214 OFFICE O/P EST MOD 30 MIN: CPT | Performed by: PHYSICIAN ASSISTANT

## 2018-03-12 PROCEDURE — 85576 BLOOD PLATELET AGGREGATION: CPT

## 2018-03-12 PROCEDURE — 80048 BASIC METABOLIC PNL TOTAL CA: CPT

## 2018-03-12 PROCEDURE — 85730 THROMBOPLASTIN TIME PARTIAL: CPT

## 2018-03-12 PROCEDURE — 81025 URINE PREGNANCY TEST: CPT

## 2018-03-12 PROCEDURE — 93005 ELECTROCARDIOGRAM TRACING: CPT

## 2018-03-12 PROCEDURE — 74018 RADEX ABDOMEN 1 VIEW: CPT

## 2018-03-12 PROCEDURE — 85610 PROTHROMBIN TIME: CPT

## 2018-03-12 PROCEDURE — 85025 COMPLETE CBC W/AUTO DIFF WBC: CPT

## 2018-03-12 RX ORDER — CIPROFLOXACIN 2 MG/ML
400 INJECTION, SOLUTION INTRAVENOUS ONCE
Status: CANCELLED | OUTPATIENT
Start: 2018-03-13

## 2018-03-12 RX ORDER — ONDANSETRON HYDROCHLORIDE 8 MG/1
TABLET, FILM COATED ORAL
Refills: 0 | COMMUNITY
Start: 2018-02-26

## 2018-03-12 ASSESSMENT — ENCOUNTER SYMPTOMS
BACK PAIN: 0
SHORTNESS OF BREATH: 0
ABDOMINAL PAIN: 0
WHEEZING: 0
SINUS PAIN: 0
BLURRED VISION: 0
VOMITING: 0
EYE PAIN: 0

## 2018-03-12 NOTE — PROGRESS NOTES
Coordination normal.   Skin: She is not diaphoretic. No pallor. DATA:  U/A:    Lab Results   Component Value Date    NITRITE neg 03/08/2017    COLORU YELLOW 03/05/2018    PROTEINU Negative 03/05/2018    PHUR 6.0 03/05/2018    WBCUA 3-5 03/05/2018    RBCUA 0-1 03/05/2018    RBCUA 224 11/30/2015    YEAST 0 07/06/2016    BACTERIA NEGATIVE 07/11/2017    CLARITYU CLOUDY 03/05/2018    SPECGRAV 1.016 03/05/2018    LEUKOCYTESUR SMALL 03/05/2018    UROBILINOGEN 1.0 03/05/2018    BILIRUBINUR Negative 03/05/2018    BILIRUBINUR neg 03/08/2017    BILIRUBINUR SMALL 08/01/2011    BLOODU SMALL 03/05/2018    GLUCOSEU Negative 03/05/2018          Imaging:  Approximate 5 mm stone right lower pole at level of L2 on the right. 1. Right ureteral stone  Passed in the hospital.    2. Kidney stones    We'll treat the right renal stone seen on KUB today. She is on schedule for tomorrow for ESWL. I went over medication she should've the voided and she is avoid all nonsteroidal anti-inflammatories aspirin Toradol. No orders of the defined types were placed in this encounter. No orders of the defined types were placed in this encounter. Plan:  Patient on scheduled tomorrow for right ESWL.

## 2018-03-13 ENCOUNTER — TELEPHONE (OUTPATIENT)
Dept: UROLOGY | Age: 33
End: 2018-03-13

## 2018-03-13 NOTE — TELEPHONE ENCOUNTER
I contacted pt  yesterday and notified her that her Platelet test result was abnormal and that Dr Axel Gordon wanted to reschedule her sx in two weeks, I called pt back today and notified her of the new surgery date(March 27) and to make sure that she did not take any aspirin, Nsaids, or blood thinners from this day until after surgery and ok'd by Dr Axel Gordon

## 2018-03-20 RX ORDER — TOPIRAMATE 100 MG/1
TABLET, FILM COATED ORAL
Qty: 60 TABLET | Refills: 5 | Status: SHIPPED | OUTPATIENT
Start: 2018-03-20 | End: 2018-03-22 | Stop reason: ALTCHOICE

## 2018-03-22 ENCOUNTER — OFFICE VISIT (OUTPATIENT)
Dept: NEUROLOGY | Facility: CLINIC | Age: 33
End: 2018-03-22

## 2018-03-22 VITALS
WEIGHT: 190 LBS | BODY MASS INDEX: 31.65 KG/M2 | HEART RATE: 88 BPM | SYSTOLIC BLOOD PRESSURE: 124 MMHG | HEIGHT: 65 IN | DIASTOLIC BLOOD PRESSURE: 80 MMHG

## 2018-03-22 DIAGNOSIS — Z98.2 S/P VENTRICULOPERITONEAL SHUNT: ICD-10-CM

## 2018-03-22 DIAGNOSIS — G93.2 PSEUDOTUMOR CEREBRI: ICD-10-CM

## 2018-03-22 DIAGNOSIS — G43.719 INTRACTABLE CHRONIC MIGRAINE WITHOUT AURA AND WITHOUT STATUS MIGRAINOSUS: Primary | ICD-10-CM

## 2018-03-22 PROCEDURE — 99213 OFFICE O/P EST LOW 20 MIN: CPT | Performed by: CLINICAL NURSE SPECIALIST

## 2018-03-22 RX ORDER — PRENATAL VIT NO.126/IRON/FOLIC 28MG-0.8MG
1 TABLET ORAL DAILY
COMMUNITY
End: 2018-08-01

## 2018-03-22 RX ORDER — CITALOPRAM 40 MG/1
40 TABLET ORAL DAILY
COMMUNITY
Start: 2017-09-20 | End: 2018-08-01
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
## 2018-03-22 NOTE — PATIENT INSTRUCTIONS
Wean and discontinue topamax:  Week 1: take 1/2 tablet in AM and 1 tablet in PM  Week 2: 1/2 tablet twice daily  Week 3: 1/4 tablet in AM and 1/2 tablet PM  Week 4: 1/4 tablet at HS then stop.

## 2018-03-26 ENCOUNTER — ANESTHESIA EVENT (OUTPATIENT)
Dept: OPERATING ROOM | Age: 33
End: 2018-03-26
Payer: COMMERCIAL

## 2018-03-26 ENCOUNTER — HOSPITAL ENCOUNTER (OUTPATIENT)
Dept: PREADMISSION TESTING | Age: 33
Discharge: HOME | End: 2018-03-30
Attending: UROLOGY
Payer: COMMERCIAL

## 2018-03-26 VITALS — BODY MASS INDEX: 31.76 KG/M2 | WEIGHT: 186 LBS | HEIGHT: 64 IN

## 2018-03-26 LAB
ANION GAP SERPL CALCULATED.3IONS-SCNC: 12 MMOL/L (ref 7–19)
APTT: 26.1 SEC (ref 26–36.2)
BASOPHILS ABSOLUTE: 0.1 K/UL (ref 0–0.2)
BASOPHILS RELATIVE PERCENT: 0.5 % (ref 0–1)
BUN BLDV-MCNC: 8 MG/DL (ref 6–20)
CALCIUM SERPL-MCNC: 9.1 MG/DL (ref 8.6–10)
CHLORIDE BLD-SCNC: 105 MMOL/L (ref 98–111)
CO2: 20 MMOL/L (ref 22–29)
COLLAGEN EPINEPHRINE TIME: 107 SEC (ref 84–176)
CREAT SERPL-MCNC: 0.7 MG/DL (ref 0.5–0.9)
EKG P AXIS: 6 DEGREES
EKG P-R INTERVAL: 172 MS
EKG Q-T INTERVAL: 366 MS
EKG QRS DURATION: 76 MS
EKG QTC CALCULATION (BAZETT): 411 MS
EKG T AXIS: 21 DEGREES
EOSINOPHILS ABSOLUTE: 0.3 K/UL (ref 0–0.6)
EOSINOPHILS RELATIVE PERCENT: 2.4 % (ref 0–5)
GFR NON-AFRICAN AMERICAN: >60
GLUCOSE BLD-MCNC: 125 MG/DL (ref 74–109)
HCG QUALITATIVE: NEGATIVE
HCT VFR BLD CALC: 42.3 % (ref 37–47)
HEMOGLOBIN: 14.2 G/DL (ref 12–16)
INR BLD: 0.89 (ref 0.88–1.18)
LYMPHOCYTES ABSOLUTE: 2.1 K/UL (ref 1.1–4.5)
LYMPHOCYTES RELATIVE PERCENT: 17.9 % (ref 20–40)
MCH RBC QN AUTO: 30.9 PG (ref 27–31)
MCHC RBC AUTO-ENTMCNC: 33.6 G/DL (ref 33–37)
MCV RBC AUTO: 92 FL (ref 81–99)
MONOCYTES ABSOLUTE: 0.6 K/UL (ref 0–0.9)
MONOCYTES RELATIVE PERCENT: 4.9 % (ref 0–10)
NEUTROPHILS ABSOLUTE: 8.4 K/UL (ref 1.5–7.5)
NEUTROPHILS RELATIVE PERCENT: 72.6 % (ref 50–65)
PDW BLD-RTO: 13.4 % (ref 11.5–14.5)
PLATELET # BLD: 229 K/UL (ref 130–400)
PLATELET FUNCTION INTERPRETATION: NORMAL
PMV BLD AUTO: 11.5 FL (ref 9.4–12.3)
POTASSIUM SERPL-SCNC: 3.9 MMOL/L (ref 3.5–5)
PROTHROMBIN TIME: 11.9 SEC (ref 12–14.6)
RBC # BLD: 4.6 M/UL (ref 4.2–5.4)
SODIUM BLD-SCNC: 137 MMOL/L (ref 136–145)
WBC # BLD: 11.5 K/UL (ref 4.8–10.8)

## 2018-03-26 PROCEDURE — 85730 THROMBOPLASTIN TIME PARTIAL: CPT

## 2018-03-26 PROCEDURE — 85576 BLOOD PLATELET AGGREGATION: CPT

## 2018-03-26 PROCEDURE — 84703 CHORIONIC GONADOTROPIN ASSAY: CPT

## 2018-03-26 PROCEDURE — 80048 BASIC METABOLIC PNL TOTAL CA: CPT

## 2018-03-26 PROCEDURE — 93005 ELECTROCARDIOGRAM TRACING: CPT

## 2018-03-26 PROCEDURE — 85025 COMPLETE CBC W/AUTO DIFF WBC: CPT

## 2018-03-26 PROCEDURE — 85610 PROTHROMBIN TIME: CPT

## 2018-03-26 RX ORDER — CIPROFLOXACIN 2 MG/ML
400 INJECTION, SOLUTION INTRAVENOUS ONCE
Status: CANCELLED | OUTPATIENT
Start: 2018-03-27

## 2018-03-26 RX ORDER — VERAPAMIL HYDROCHLORIDE 120 MG/1
120 CAPSULE, EXTENDED RELEASE ORAL NIGHTLY
COMMUNITY

## 2018-03-27 ENCOUNTER — HOSPITAL ENCOUNTER (OUTPATIENT)
Age: 33
Setting detail: OUTPATIENT SURGERY
Discharge: HOME | End: 2018-03-27
Attending: UROLOGY | Admitting: UROLOGY
Payer: COMMERCIAL

## 2018-03-27 ENCOUNTER — ANESTHESIA (OUTPATIENT)
Dept: OPERATING ROOM | Age: 33
End: 2018-03-27
Payer: COMMERCIAL

## 2018-03-27 ENCOUNTER — APPOINTMENT (OUTPATIENT)
Dept: GENERAL RADIOLOGY | Age: 33
End: 2018-03-27
Attending: UROLOGY
Payer: COMMERCIAL

## 2018-03-27 VITALS
HEART RATE: 96 BPM | SYSTOLIC BLOOD PRESSURE: 103 MMHG | HEIGHT: 64 IN | OXYGEN SATURATION: 98 % | DIASTOLIC BLOOD PRESSURE: 65 MMHG | RESPIRATION RATE: 16 BRPM | BODY MASS INDEX: 31.76 KG/M2 | WEIGHT: 186 LBS | TEMPERATURE: 97.9 F

## 2018-03-27 VITALS
OXYGEN SATURATION: 96 % | TEMPERATURE: 98.6 F | SYSTOLIC BLOOD PRESSURE: 97 MMHG | RESPIRATION RATE: 14 BRPM | DIASTOLIC BLOOD PRESSURE: 54 MMHG

## 2018-03-27 DIAGNOSIS — N20.0 RENAL CALCULUS, RIGHT: Primary | ICD-10-CM

## 2018-03-27 LAB
HCG(URINE) PREGNANCY TEST: NEGATIVE
HCT VFR BLD CALC: 37.2 % (ref 37–47)
HEMOGLOBIN: 12.8 G/DL (ref 12–16)

## 2018-03-27 PROCEDURE — 74018 RADEX ABDOMEN 1 VIEW: CPT

## 2018-03-27 PROCEDURE — 50590 FRAGMENTING OF KIDNEY STONE: CPT | Performed by: UROLOGY

## 2018-03-27 PROCEDURE — 2580000003 HC RX 258: Performed by: ANESTHESIOLOGY

## 2018-03-27 PROCEDURE — 7100000000 HC PACU RECOVERY - FIRST 15 MIN: Performed by: UROLOGY

## 2018-03-27 PROCEDURE — 36415 COLL VENOUS BLD VENIPUNCTURE: CPT

## 2018-03-27 PROCEDURE — 81025 URINE PREGNANCY TEST: CPT

## 2018-03-27 PROCEDURE — 3700000001 HC ADD 15 MINUTES (ANESTHESIA): Performed by: UROLOGY

## 2018-03-27 PROCEDURE — 7100000010 HC PHASE II RECOVERY - FIRST 15 MIN: Performed by: UROLOGY

## 2018-03-27 PROCEDURE — 6360000002 HC RX W HCPCS: Performed by: UROLOGY

## 2018-03-27 PROCEDURE — 6360000002 HC RX W HCPCS: Performed by: NURSE ANESTHETIST, CERTIFIED REGISTERED

## 2018-03-27 PROCEDURE — 3700000000 HC ANESTHESIA ATTENDED CARE: Performed by: UROLOGY

## 2018-03-27 PROCEDURE — 85018 HEMOGLOBIN: CPT

## 2018-03-27 PROCEDURE — 7100000001 HC PACU RECOVERY - ADDTL 15 MIN: Performed by: UROLOGY

## 2018-03-27 PROCEDURE — 2500000003 HC RX 250 WO HCPCS: Performed by: NURSE ANESTHETIST, CERTIFIED REGISTERED

## 2018-03-27 PROCEDURE — 85014 HEMATOCRIT: CPT

## 2018-03-27 PROCEDURE — 6370000000 HC RX 637 (ALT 250 FOR IP): Performed by: UROLOGY

## 2018-03-27 PROCEDURE — 7100000011 HC PHASE II RECOVERY - ADDTL 15 MIN: Performed by: UROLOGY

## 2018-03-27 RX ORDER — PROPOFOL 10 MG/ML
INJECTION, EMULSION INTRAVENOUS PRN
Status: DISCONTINUED | OUTPATIENT
Start: 2018-03-27 | End: 2018-03-27 | Stop reason: SDUPTHER

## 2018-03-27 RX ORDER — MORPHINE SULFATE 4 MG/ML
2 INJECTION, SOLUTION INTRAMUSCULAR; INTRAVENOUS EVERY 5 MIN PRN
Status: DISCONTINUED | OUTPATIENT
Start: 2018-03-27 | End: 2018-03-27 | Stop reason: HOSPADM

## 2018-03-27 RX ORDER — FENTANYL CITRATE 50 UG/ML
INJECTION, SOLUTION INTRAMUSCULAR; INTRAVENOUS PRN
Status: DISCONTINUED | OUTPATIENT
Start: 2018-03-27 | End: 2018-03-27 | Stop reason: SDUPTHER

## 2018-03-27 RX ORDER — SODIUM CHLORIDE 0.9 % (FLUSH) 0.9 %
10 SYRINGE (ML) INJECTION EVERY 12 HOURS SCHEDULED
Status: DISCONTINUED | OUTPATIENT
Start: 2018-03-27 | End: 2018-03-27 | Stop reason: HOSPADM

## 2018-03-27 RX ORDER — MEPERIDINE HYDROCHLORIDE 50 MG/ML
12.5 INJECTION INTRAMUSCULAR; INTRAVENOUS; SUBCUTANEOUS EVERY 5 MIN PRN
Status: DISCONTINUED | OUTPATIENT
Start: 2018-03-27 | End: 2018-03-27 | Stop reason: HOSPADM

## 2018-03-27 RX ORDER — SODIUM CHLORIDE 0.9 % (FLUSH) 0.9 %
10 SYRINGE (ML) INJECTION PRN
Status: DISCONTINUED | OUTPATIENT
Start: 2018-03-27 | End: 2018-03-27 | Stop reason: HOSPADM

## 2018-03-27 RX ORDER — SCOLOPAMINE TRANSDERMAL SYSTEM 1 MG/1
1 PATCH, EXTENDED RELEASE TRANSDERMAL
Status: DISCONTINUED | OUTPATIENT
Start: 2018-03-27 | End: 2018-03-27 | Stop reason: HOSPADM

## 2018-03-27 RX ORDER — LABETALOL HYDROCHLORIDE 5 MG/ML
5 INJECTION, SOLUTION INTRAVENOUS EVERY 10 MIN PRN
Status: DISCONTINUED | OUTPATIENT
Start: 2018-03-27 | End: 2018-03-27 | Stop reason: HOSPADM

## 2018-03-27 RX ORDER — TAMSULOSIN HYDROCHLORIDE 0.4 MG/1
0.4 CAPSULE ORAL ONCE
Status: COMPLETED | OUTPATIENT
Start: 2018-03-27 | End: 2018-03-27

## 2018-03-27 RX ORDER — OXYCODONE HYDROCHLORIDE AND ACETAMINOPHEN 5; 325 MG/1; MG/1
2 TABLET ORAL EVERY 4 HOURS PRN
Status: DISCONTINUED | OUTPATIENT
Start: 2018-03-27 | End: 2018-03-27 | Stop reason: HOSPADM

## 2018-03-27 RX ORDER — CIPROFLOXACIN 2 MG/ML
400 INJECTION, SOLUTION INTRAVENOUS ONCE
Status: COMPLETED | OUTPATIENT
Start: 2018-03-27 | End: 2018-03-27

## 2018-03-27 RX ORDER — ENALAPRILAT 2.5 MG/2ML
1.25 INJECTION INTRAVENOUS
Status: DISCONTINUED | OUTPATIENT
Start: 2018-03-27 | End: 2018-03-27 | Stop reason: HOSPADM

## 2018-03-27 RX ORDER — HYDRALAZINE HYDROCHLORIDE 20 MG/ML
5 INJECTION INTRAMUSCULAR; INTRAVENOUS EVERY 10 MIN PRN
Status: DISCONTINUED | OUTPATIENT
Start: 2018-03-27 | End: 2018-03-27 | Stop reason: HOSPADM

## 2018-03-27 RX ORDER — LIDOCAINE HYDROCHLORIDE 10 MG/ML
INJECTION, SOLUTION INFILTRATION; PERINEURAL PRN
Status: DISCONTINUED | OUTPATIENT
Start: 2018-03-27 | End: 2018-03-27 | Stop reason: SDUPTHER

## 2018-03-27 RX ORDER — MORPHINE SULFATE 4 MG/ML
2 INJECTION, SOLUTION INTRAMUSCULAR; INTRAVENOUS EVERY 4 HOURS PRN
Status: DISCONTINUED | OUTPATIENT
Start: 2018-03-27 | End: 2018-03-27 | Stop reason: HOSPADM

## 2018-03-27 RX ORDER — OXYCODONE HYDROCHLORIDE AND ACETAMINOPHEN 5; 325 MG/1; MG/1
1 TABLET ORAL EVERY 4 HOURS PRN
Qty: 30 TABLET | Refills: 0 | Status: SHIPPED | OUTPATIENT
Start: 2018-03-27 | End: 2018-04-03

## 2018-03-27 RX ORDER — ROCURONIUM BROMIDE 10 MG/ML
INJECTION, SOLUTION INTRAVENOUS PRN
Status: DISCONTINUED | OUTPATIENT
Start: 2018-03-27 | End: 2018-03-27 | Stop reason: SDUPTHER

## 2018-03-27 RX ORDER — DEXAMETHASONE SODIUM PHOSPHATE 10 MG/ML
INJECTION INTRAMUSCULAR; INTRAVENOUS PRN
Status: DISCONTINUED | OUTPATIENT
Start: 2018-03-27 | End: 2018-03-27 | Stop reason: SDUPTHER

## 2018-03-27 RX ORDER — FENTANYL CITRATE 50 UG/ML
50 INJECTION, SOLUTION INTRAMUSCULAR; INTRAVENOUS
Status: DISCONTINUED | OUTPATIENT
Start: 2018-03-27 | End: 2018-03-27 | Stop reason: HOSPADM

## 2018-03-27 RX ORDER — ONDANSETRON 2 MG/ML
INJECTION INTRAMUSCULAR; INTRAVENOUS PRN
Status: DISCONTINUED | OUTPATIENT
Start: 2018-03-27 | End: 2018-03-27 | Stop reason: SDUPTHER

## 2018-03-27 RX ORDER — METOCLOPRAMIDE HYDROCHLORIDE 5 MG/ML
10 INJECTION INTRAMUSCULAR; INTRAVENOUS
Status: DISCONTINUED | OUTPATIENT
Start: 2018-03-27 | End: 2018-03-27 | Stop reason: HOSPADM

## 2018-03-27 RX ORDER — SODIUM CHLORIDE, SODIUM LACTATE, POTASSIUM CHLORIDE, CALCIUM CHLORIDE 600; 310; 30; 20 MG/100ML; MG/100ML; MG/100ML; MG/100ML
INJECTION, SOLUTION INTRAVENOUS CONTINUOUS
Status: DISCONTINUED | OUTPATIENT
Start: 2018-03-27 | End: 2018-03-27 | Stop reason: HOSPADM

## 2018-03-27 RX ORDER — PROMETHAZINE HYDROCHLORIDE 25 MG/ML
6.25 INJECTION, SOLUTION INTRAMUSCULAR; INTRAVENOUS
Status: DISCONTINUED | OUTPATIENT
Start: 2018-03-27 | End: 2018-03-27 | Stop reason: HOSPADM

## 2018-03-27 RX ORDER — LIDOCAINE HYDROCHLORIDE 10 MG/ML
1 INJECTION, SOLUTION EPIDURAL; INFILTRATION; INTRACAUDAL; PERINEURAL
Status: DISCONTINUED | OUTPATIENT
Start: 2018-03-27 | End: 2018-03-27 | Stop reason: HOSPADM

## 2018-03-27 RX ORDER — MIDAZOLAM HYDROCHLORIDE 1 MG/ML
INJECTION INTRAMUSCULAR; INTRAVENOUS PRN
Status: DISCONTINUED | OUTPATIENT
Start: 2018-03-27 | End: 2018-03-27 | Stop reason: SDUPTHER

## 2018-03-27 RX ORDER — TAMSULOSIN HYDROCHLORIDE 0.4 MG/1
0.4 CAPSULE ORAL DAILY
Qty: 14 CAPSULE | Refills: 1 | Status: SHIPPED | OUTPATIENT
Start: 2018-03-27 | End: 2018-11-25

## 2018-03-27 RX ORDER — DIPHENHYDRAMINE HYDROCHLORIDE 50 MG/ML
12.5 INJECTION INTRAMUSCULAR; INTRAVENOUS
Status: DISCONTINUED | OUTPATIENT
Start: 2018-03-27 | End: 2018-03-27 | Stop reason: HOSPADM

## 2018-03-27 RX ORDER — MIDAZOLAM HYDROCHLORIDE 1 MG/ML
2 INJECTION INTRAMUSCULAR; INTRAVENOUS
Status: DISCONTINUED | OUTPATIENT
Start: 2018-03-27 | End: 2018-03-27 | Stop reason: HOSPADM

## 2018-03-27 RX ORDER — FENTANYL CITRATE 50 UG/ML
25 INJECTION, SOLUTION INTRAMUSCULAR; INTRAVENOUS
Status: DISCONTINUED | OUTPATIENT
Start: 2018-03-27 | End: 2018-03-27 | Stop reason: HOSPADM

## 2018-03-27 RX ORDER — SODIUM CHLORIDE 9 MG/ML
INJECTION, SOLUTION INTRAVENOUS CONTINUOUS
Status: DISCONTINUED | OUTPATIENT
Start: 2018-03-27 | End: 2018-03-27 | Stop reason: HOSPADM

## 2018-03-27 RX ORDER — ONDANSETRON 2 MG/ML
4 INJECTION INTRAMUSCULAR; INTRAVENOUS EVERY 4 HOURS PRN
Status: DISCONTINUED | OUTPATIENT
Start: 2018-03-27 | End: 2018-03-27 | Stop reason: HOSPADM

## 2018-03-27 RX ORDER — MORPHINE SULFATE 4 MG/ML
4 INJECTION, SOLUTION INTRAMUSCULAR; INTRAVENOUS EVERY 5 MIN PRN
Status: DISCONTINUED | OUTPATIENT
Start: 2018-03-27 | End: 2018-03-27 | Stop reason: HOSPADM

## 2018-03-27 RX ORDER — CIPROFLOXACIN 2 MG/ML
400 INJECTION, SOLUTION INTRAVENOUS
Status: DISCONTINUED | OUTPATIENT
Start: 2018-03-27 | End: 2018-03-27 | Stop reason: SDUPTHER

## 2018-03-27 RX ADMIN — PROPOFOL 150 MG: 10 INJECTION, EMULSION INTRAVENOUS at 08:04

## 2018-03-27 RX ADMIN — MIDAZOLAM HYDROCHLORIDE 2 MG: 1 INJECTION, SOLUTION INTRAMUSCULAR; INTRAVENOUS at 07:59

## 2018-03-27 RX ADMIN — FENTANYL CITRATE 100 MCG: 50 INJECTION, SOLUTION INTRAMUSCULAR; INTRAVENOUS at 08:04

## 2018-03-27 RX ADMIN — CIPROFLOXACIN 400 MG: 2 INJECTION INTRAVENOUS at 08:07

## 2018-03-27 RX ADMIN — TAMSULOSIN HYDROCHLORIDE 0.4 MG: 0.4 CAPSULE ORAL at 09:47

## 2018-03-27 RX ADMIN — ONDANSETRON HYDROCHLORIDE 4 MG: 2 SOLUTION INTRAMUSCULAR; INTRAVENOUS at 08:11

## 2018-03-27 RX ADMIN — SUGAMMADEX 160 MG: 100 INJECTION, SOLUTION INTRAVENOUS at 08:49

## 2018-03-27 RX ADMIN — SODIUM CHLORIDE, SODIUM LACTATE, POTASSIUM CHLORIDE, AND CALCIUM CHLORIDE: 600; 310; 30; 20 INJECTION, SOLUTION INTRAVENOUS at 07:57

## 2018-03-27 RX ADMIN — DEXAMETHASONE SODIUM PHOSPHATE 10 MG: 10 INJECTION INTRAMUSCULAR; INTRAVENOUS at 08:11

## 2018-03-27 RX ADMIN — LIDOCAINE HYDROCHLORIDE 50 MG: 10 INJECTION, SOLUTION INFILTRATION; PERINEURAL at 08:04

## 2018-03-27 RX ADMIN — ROCURONIUM BROMIDE 40 MG: 10 INJECTION INTRAVENOUS at 08:04

## 2018-03-27 ASSESSMENT — PAIN SCALES - GENERAL: PAINLEVEL_OUTOF10: 0

## 2018-03-27 ASSESSMENT — PAIN - FUNCTIONAL ASSESSMENT: PAIN_FUNCTIONAL_ASSESSMENT: 0-10

## 2018-03-27 ASSESSMENT — LIFESTYLE VARIABLES: SMOKING_STATUS: 1

## 2018-03-27 NOTE — H&P
Progress Notes  Encounter Date: 3/12/2018  Annabel Loza PA-C   Physician Assistant   Expand All Collapse All    []Manual[]Template  []Copied  Chacha Stapleton is a 35 y.o. female who presents today        Chief Complaint   Patient presents with    Follow-up       follow up on kidney stones  kub done    [facility] follow-up/kidney stones:  Patient was in the hospital with pain and symptoms related to 6 mm right ureteral stone. She was having severe right-sided lower abdominal pain. She was admitted for pain control and was on the schedule for ureteroscopy 18. She passed the stone 18 it was a 6 mm stone.  Her symptoms resolved she is here today with KUB as she is on the schedule tomorrow for ESWL for a stone in the right kidney.     Past Medical History        Past Medical History:   Diagnosis Date    Fatigue      Fever      GERD (gastroesophageal reflux disease)      Gum disease      Headaches, cluster      Joint ache      Nausea & vomiting      Pseudotumor cerebri       sees dr. Ravindra Mcgee in [facility]    Renal calculus, right 3/4/2018    Weight gain              Past Surgical History         Past Surgical History:   Procedure Laterality Date    ABDOMEN SURGERY        BREAST ENHANCEMENT SURGERY         left breast implant    CHOLECYSTECTOMY        COLONOSCOPY        LITHOTRIPSY Right 2016     CYSTOSCOPY RETROGRADE PYELOGRAM LASER LITHOTRIPSY performed by Milind Alanis MD at [address]       VENTRICULOPERITONEAL SHUNT                Current Facility-Administered Medications          Current Outpatient Prescriptions   Medication Sig Dispense Refill    ondansetron (ZOFRAN) 8 MG tablet DISSOLVE 1 TABLET IN MOUTH EVERY 8 HOURS AS NEEDED FOR NAUSEA AND VOMITING   0    Prenatal Multivit-Min-Fe-FA (PRE-KHALIDA FORMULA PO) Take 1 tablet by mouth daily        citalopram (CELEXA) 40 MG tablet TK 1 T PO QD   1    topiramate (TOPAMAX) 100 MG tablet Take 100 mg

## 2018-03-27 NOTE — BRIEF OP NOTE
Urology Brief Op Note    Patient Name: Douglas Clements    : 1985    MRN: [id number]    Pre-operative Diagnosis: R RENAL STONES    Post-operative Diagnosis: Same     Procedure: Procedure(s): Right renal  ESWL EXTRACORPEAL SHOCK WAVE LITHOTRIPSY    Anesthesia: General    Surgeon: Hollie Cohen MD    Assistants: Scrub Person First: Jcarlos Gill    Estimated Blood Loss: * No values recorded between 3/27/2018  8:01 AM and 3/27/2018  6:60 AM *    Complications: none    Findings: Excellent fragmentation the stone. 3000 shockwaves power level up to 7    Specimens:  * No specimens in log *    Disposition/Plan: To PACU in stable condition. Then to [facility] out patient.   Follow-up in 2 weeks with Haider Mcadams MD  3/27/2018  8:52 AM

## 2018-03-27 NOTE — ANESTHESIA PRE PROCEDURE
Department of Anesthesiology  Preprocedure Note       Name:  Alina Devine   Age:  35 y.o.  :  1985                                          MRN:  [id number]         Date:  3/27/2018      Surgeon: Tahira Toro):  Valerie Blackburn MD    Procedure: Procedure(s):  ESWL EXTRACORPEAL SHOCK WAVE LITHOTRIPSY    Medications prior to admission:   Prior to Admission medications    Medication Sig Start Date End Date Taking?  Authorizing Provider   verapamil (VERELAN) 120 MG extended release capsule Take 120 mg by mouth nightly MIGRAINES    Historical Provider, MD   ondansetron (ZOFRAN) 8 MG tablet DISSOLVE 1 TABLET IN MOUTH EVERY 8 HOURS AS NEEDED FOR NAUSEA AND VOMITING 18   Historical Provider, MD   Prenatal Multivit-Min-Fe-FA (PRE- FORMULA PO) Take 1 tablet by mouth daily    Historical Provider, MD   citalopram (CELEXA) 40 MG tablet TK 1 T PO Q HS 17   Historical Provider, MD   topiramate (TOPAMAX) 100 MG tablet Take 100 mg by mouth 2 times daily 50 MG Q AM  MG IN THE EVENING--TRYING TO WEAN OFF THIS MED    Historical Provider, MD       Current medications:    Current Facility-Administered Medications   Medication Dose Route Frequency Provider Last Rate Last Dose    ciprofloxacin (CIPRO) IVPB 400 mg  400 mg Intravenous Once Valerie Blackburn MD        sodium chloride flush 0.9 % injection 10 mL  10 mL Intravenous 2 times per day Colene Peabody, PA-C        sodium chloride flush 0.9 % injection 10 mL  10 mL Intravenous PRN Colene Peabody, PA-C           Allergies:  No Known Allergies    Problem List:    Patient Active Problem List   Diagnosis Code    IBS (irritable bowel syndrome) K58.9    Right ureteral calculus N20.1    Renal calculus, right N20.0       Past Medical History:        Diagnosis Date    Fatigue     Fever     GERD (gastroesophageal reflux disease)     Gum disease     Headaches, cluster     Joint ache     Nausea & vomiting     PONV (postoperative nausea and vomiting)

## 2018-04-10 ENCOUNTER — OFFICE VISIT (OUTPATIENT)
Dept: UROLOGY | Age: 33
End: 2018-04-10
Payer: COMMERCIAL

## 2018-04-10 ENCOUNTER — HOSPITAL ENCOUNTER (OUTPATIENT)
Dept: GENERAL RADIOLOGY | Age: 33
Discharge: HOME | End: 2018-04-10
Payer: COMMERCIAL

## 2018-04-10 VITALS
SYSTOLIC BLOOD PRESSURE: 120 MMHG | TEMPERATURE: 96.3 F | BODY MASS INDEX: 30.99 KG/M2 | HEIGHT: 65 IN | DIASTOLIC BLOOD PRESSURE: 83 MMHG | WEIGHT: 186 LBS

## 2018-04-10 DIAGNOSIS — N20.1 RIGHT URETERAL STONE: Primary | ICD-10-CM

## 2018-04-10 DIAGNOSIS — N20.0 RENAL CALCULUS, RIGHT: ICD-10-CM

## 2018-04-10 DIAGNOSIS — N20.0 BILATERAL KIDNEY STONES: ICD-10-CM

## 2018-04-10 LAB
BACTERIA URINE, POC: 0
BILIRUBIN URINE: 0 MG/DL
BLOOD, URINE: POSITIVE
CASTS URINE, POC: NORMAL
CLARITY: NORMAL
COLOR: NORMAL
CRYSTALS URINE, POC: NORMAL
EPI CELLS URINE, POC: NORMAL
GLUCOSE URINE: NORMAL
KETONES, URINE: NEGATIVE
LEUKOCYTE EST, POC: NORMAL
NITRITE, URINE: NEGATIVE
PH UA: 7 (ref 4.5–8)
PROTEIN UA: NEGATIVE
RBC URINE, POC: NORMAL
SPECIFIC GRAVITY UA: 1.02 (ref 1–1.03)
UROBILINOGEN, URINE: NORMAL
WBC URINE, POC: 0
YEAST URINE, POC: NORMAL

## 2018-04-10 PROCEDURE — 99024 POSTOP FOLLOW-UP VISIT: CPT | Performed by: PHYSICIAN ASSISTANT

## 2018-04-10 PROCEDURE — 74018 RADEX ABDOMEN 1 VIEW: CPT

## 2018-04-10 PROCEDURE — 81001 URINALYSIS AUTO W/SCOPE: CPT | Performed by: PHYSICIAN ASSISTANT

## 2018-04-10 ASSESSMENT — ENCOUNTER SYMPTOMS
BLURRED VISION: 0
SHORTNESS OF BREATH: 0
HEARTBURN: 0
SORE THROAT: 0
DOUBLE VISION: 0
NAUSEA: 0
WHEEZING: 0

## 2018-06-08 ENCOUNTER — TELEPHONE (OUTPATIENT)
Dept: NEUROLOGY | Facility: CLINIC | Age: 33
End: 2018-06-08

## 2018-06-08 RX ORDER — PROPRANOLOL HYDROCHLORIDE 40 MG/1
40 TABLET ORAL 2 TIMES DAILY
Qty: 60 TABLET | Refills: 2 | Status: SHIPPED | OUTPATIENT
Start: 2018-06-08 | End: 2018-08-01

## 2018-06-08 NOTE — TELEPHONE ENCOUNTER
I did speak with Sandy and will d/c verapamil. Will trial propranolo 40 mg BId and counseled on side effect.s

## 2018-06-27 ENCOUNTER — HOSPITAL ENCOUNTER (EMERGENCY)
Facility: HOSPITAL | Age: 33
Discharge: HOME | End: 2018-06-27
Attending: EMERGENCY MEDICINE | Admitting: EMERGENCY MEDICINE

## 2018-06-27 ENCOUNTER — APPOINTMENT (OUTPATIENT)
Dept: CT IMAGING | Facility: HOSPITAL | Age: 33
End: 2018-06-27

## 2018-06-27 VITALS
OXYGEN SATURATION: 97 % | RESPIRATION RATE: 16 BRPM | HEIGHT: 65 IN | TEMPERATURE: 98.1 F | HEART RATE: 80 BPM | BODY MASS INDEX: 32.65 KG/M2 | SYSTOLIC BLOOD PRESSURE: 105 MMHG | DIASTOLIC BLOOD PRESSURE: 80 MMHG | WEIGHT: 196 LBS

## 2018-06-27 DIAGNOSIS — R51.9 NONINTRACTABLE HEADACHE, UNSPECIFIED CHRONICITY PATTERN, UNSPECIFIED HEADACHE TYPE: Primary | ICD-10-CM

## 2018-06-27 LAB
ALBUMIN SERPL-MCNC: 4.3 G/DL (ref 3.5–5)
ALBUMIN/GLOB SERPL: 1.4 G/DL (ref 1.1–2.5)
ALP SERPL-CCNC: 79 U/L (ref 24–120)
ALT SERPL W P-5'-P-CCNC: 74 U/L (ref 0–54)
ANION GAP SERPL CALCULATED.3IONS-SCNC: 14 MMOL/L (ref 4–13)
AST SERPL-CCNC: 46 U/L (ref 7–45)
BASOPHILS # BLD AUTO: 0.07 10*3/MM3 (ref 0–0.2)
BASOPHILS NFR BLD AUTO: 0.6 % (ref 0–2)
BILIRUB SERPL-MCNC: 0.5 MG/DL (ref 0.1–1)
BUN BLD-MCNC: 10 MG/DL (ref 5–21)
BUN/CREAT SERPL: 14.9 (ref 7–25)
CALCIUM SPEC-SCNC: 9.6 MG/DL (ref 8.4–10.4)
CHLORIDE SERPL-SCNC: 102 MMOL/L (ref 98–110)
CO2 SERPL-SCNC: 26 MMOL/L (ref 24–31)
CREAT BLD-MCNC: 0.67 MG/DL (ref 0.5–1.4)
DEPRECATED RDW RBC AUTO: 41.4 FL (ref 40–54)
EOSINOPHIL # BLD AUTO: 0.49 10*3/MM3 (ref 0–0.7)
EOSINOPHIL NFR BLD AUTO: 4 % (ref 0–4)
ERYTHROCYTE [DISTWIDTH] IN BLOOD BY AUTOMATED COUNT: 13.3 % (ref 12–15)
GFR SERPL CREATININE-BSD FRML MDRD: 101 ML/MIN/1.73
GLOBULIN UR ELPH-MCNC: 3.1 GM/DL
GLUCOSE BLD-MCNC: 202 MG/DL (ref 70–100)
HCT VFR BLD AUTO: 39.5 % (ref 37–47)
HGB BLD-MCNC: 13.9 G/DL (ref 12–16)
IMM GRANULOCYTES # BLD: 0.16 10*3/MM3 (ref 0–0.03)
IMM GRANULOCYTES NFR BLD: 1.3 % (ref 0–5)
LYMPHOCYTES # BLD AUTO: 2.64 10*3/MM3 (ref 0.72–4.86)
LYMPHOCYTES NFR BLD AUTO: 21.7 % (ref 15–45)
MCH RBC QN AUTO: 30.3 PG (ref 28–32)
MCHC RBC AUTO-ENTMCNC: 35.2 G/DL (ref 33–36)
MCV RBC AUTO: 86.1 FL (ref 82–98)
MONOCYTES # BLD AUTO: 0.73 10*3/MM3 (ref 0.19–1.3)
MONOCYTES NFR BLD AUTO: 6 % (ref 4–12)
NEUTROPHILS # BLD AUTO: 8.05 10*3/MM3 (ref 1.87–8.4)
NEUTROPHILS NFR BLD AUTO: 66.4 % (ref 39–78)
NRBC BLD MANUAL-RTO: 0 /100 WBC (ref 0–0)
PLATELET # BLD AUTO: 262 10*3/MM3 (ref 130–400)
PMV BLD AUTO: 11.9 FL (ref 6–12)
POTASSIUM BLD-SCNC: 3.6 MMOL/L (ref 3.5–5.3)
PROT SERPL-MCNC: 7.4 G/DL (ref 6.3–8.7)
RBC # BLD AUTO: 4.59 10*6/MM3 (ref 4.2–5.4)
SODIUM BLD-SCNC: 142 MMOL/L (ref 135–145)
WBC NRBC COR # BLD: 12.14 10*3/MM3 (ref 4.8–10.8)

## 2018-06-27 PROCEDURE — 25010000002 DEXAMETHASONE PER 1 MG: Performed by: EMERGENCY MEDICINE

## 2018-06-27 PROCEDURE — 96361 HYDRATE IV INFUSION ADD-ON: CPT

## 2018-06-27 PROCEDURE — 63710000001 PROMETHAZINE PER 25 MG: Performed by: EMERGENCY MEDICINE

## 2018-06-27 PROCEDURE — 25010000002 HYDROMORPHONE PER 4 MG: Performed by: EMERGENCY MEDICINE

## 2018-06-27 PROCEDURE — 99284 EMERGENCY DEPT VISIT MOD MDM: CPT

## 2018-06-27 PROCEDURE — 70450 CT HEAD/BRAIN W/O DYE: CPT

## 2018-06-27 PROCEDURE — 96375 TX/PRO/DX INJ NEW DRUG ADDON: CPT

## 2018-06-27 PROCEDURE — 80053 COMPREHEN METABOLIC PANEL: CPT | Performed by: EMERGENCY MEDICINE

## 2018-06-27 PROCEDURE — 25010000002 ONDANSETRON PER 1 MG: Performed by: EMERGENCY MEDICINE

## 2018-06-27 PROCEDURE — 96374 THER/PROPH/DIAG INJ IV PUSH: CPT

## 2018-06-27 PROCEDURE — 25010000002 METOCLOPRAMIDE PER 10 MG: Performed by: EMERGENCY MEDICINE

## 2018-06-27 PROCEDURE — 85025 COMPLETE CBC W/AUTO DIFF WBC: CPT | Performed by: EMERGENCY MEDICINE

## 2018-06-27 RX ORDER — ONDANSETRON 2 MG/ML
8 INJECTION INTRAMUSCULAR; INTRAVENOUS ONCE
Status: COMPLETED | OUTPATIENT
Start: 2018-06-27 | End: 2018-06-27

## 2018-06-27 RX ORDER — METOCLOPRAMIDE HYDROCHLORIDE 5 MG/ML
10 INJECTION INTRAMUSCULAR; INTRAVENOUS ONCE
Status: DISCONTINUED | OUTPATIENT
Start: 2018-06-27 | End: 2018-06-27

## 2018-06-27 RX ORDER — METOCLOPRAMIDE HYDROCHLORIDE 5 MG/ML
10 INJECTION INTRAMUSCULAR; INTRAVENOUS ONCE
Status: COMPLETED | OUTPATIENT
Start: 2018-06-27 | End: 2018-06-27

## 2018-06-27 RX ORDER — HYDROMORPHONE HYDROCHLORIDE 1 MG/ML
1 INJECTION, SOLUTION INTRAMUSCULAR; INTRAVENOUS; SUBCUTANEOUS ONCE
Status: COMPLETED | OUTPATIENT
Start: 2018-06-27 | End: 2018-06-27

## 2018-06-27 RX ORDER — DEXAMETHASONE SODIUM PHOSPHATE 10 MG/ML
10 INJECTION INTRAMUSCULAR; INTRAVENOUS ONCE
Status: COMPLETED | OUTPATIENT
Start: 2018-06-27 | End: 2018-06-27

## 2018-06-27 RX ORDER — PROMETHAZINE HYDROCHLORIDE 25 MG/1
25 TABLET ORAL ONCE
Status: COMPLETED | OUTPATIENT
Start: 2018-06-27 | End: 2018-06-27

## 2018-06-27 RX ORDER — SODIUM CHLORIDE 0.9 % (FLUSH) 0.9 %
10 SYRINGE (ML) INJECTION AS NEEDED
Status: DISCONTINUED | OUTPATIENT
Start: 2018-06-27 | End: 2018-06-28 | Stop reason: HOSPADM

## 2018-06-27 RX ORDER — SODIUM CHLORIDE 9 MG/ML
125 INJECTION, SOLUTION INTRAVENOUS CONTINUOUS
Status: DISCONTINUED | OUTPATIENT
Start: 2018-06-27 | End: 2018-06-28 | Stop reason: HOSPADM

## 2018-06-27 RX ADMIN — HYDROMORPHONE HYDROCHLORIDE 1 MG: 1 INJECTION, SOLUTION INTRAMUSCULAR; INTRAVENOUS; SUBCUTANEOUS at 20:43

## 2018-06-27 RX ADMIN — DEXAMETHASONE SODIUM PHOSPHATE 10 MG: 10 INJECTION, SOLUTION INTRAMUSCULAR; INTRAVENOUS at 21:05

## 2018-06-27 RX ADMIN — PROMETHAZINE HYDROCHLORIDE 25 MG: 25 TABLET ORAL at 22:30

## 2018-06-27 RX ADMIN — ONDANSETRON 8 MG: 2 INJECTION INTRAMUSCULAR; INTRAVENOUS at 21:10

## 2018-06-27 RX ADMIN — SODIUM CHLORIDE 125 ML/HR: 9 INJECTION, SOLUTION INTRAVENOUS at 20:46

## 2018-06-27 RX ADMIN — SODIUM CHLORIDE 1000 ML: 9 INJECTION, SOLUTION INTRAVENOUS at 20:46

## 2018-06-27 RX ADMIN — METOCLOPRAMIDE 10 MG: 5 INJECTION, SOLUTION INTRAMUSCULAR; INTRAVENOUS at 20:42

## 2018-06-28 ENCOUNTER — TELEPHONE (OUTPATIENT)
Dept: NEUROLOGY | Facility: CLINIC | Age: 33
End: 2018-06-28

## 2018-06-28 RX ORDER — MEMANTINE HYDROCHLORIDE 5 MG/1
5 TABLET ORAL 2 TIMES DAILY
Qty: 60 TABLET | Refills: 2 | Status: SHIPPED | OUTPATIENT
Start: 2018-06-28 | End: 2018-08-01 | Stop reason: SDUPTHER

## 2018-06-28 NOTE — TELEPHONE ENCOUNTER
Sandy said she quit taking propanolol due to nausea.  She was in ER yesterday because she had had a migraine for 2 days.  She said Topamax is the only thing that controlled her headaches.

## 2018-08-01 ENCOUNTER — OFFICE VISIT (OUTPATIENT)
Dept: NEUROLOGY | Facility: CLINIC | Age: 33
End: 2018-08-01

## 2018-08-01 VITALS
SYSTOLIC BLOOD PRESSURE: 120 MMHG | HEART RATE: 76 BPM | WEIGHT: 202 LBS | BODY MASS INDEX: 33.66 KG/M2 | HEIGHT: 65 IN | DIASTOLIC BLOOD PRESSURE: 80 MMHG

## 2018-08-01 DIAGNOSIS — G93.2 PSEUDOTUMOR CEREBRI: ICD-10-CM

## 2018-08-01 DIAGNOSIS — Z98.2 S/P VENTRICULOPERITONEAL SHUNT: ICD-10-CM

## 2018-08-01 DIAGNOSIS — R06.83 SNORING: ICD-10-CM

## 2018-08-01 DIAGNOSIS — G47.10 HYPERSOMNIA: ICD-10-CM

## 2018-08-01 DIAGNOSIS — G43.719 INTRACTABLE CHRONIC MIGRAINE WITHOUT AURA AND WITHOUT STATUS MIGRAINOSUS: Primary | ICD-10-CM

## 2018-08-01 PROCEDURE — 99214 OFFICE O/P EST MOD 30 MIN: CPT | Performed by: CLINICAL NURSE SPECIALIST

## 2018-08-01 RX ORDER — LEVETIRACETAM 250 MG/1
250 TABLET ORAL NIGHTLY
Qty: 30 TABLET | Refills: 2 | Status: SHIPPED | OUTPATIENT
Start: 2018-08-01 | End: 2019-02-13 | Stop reason: SDUPTHER

## 2018-08-01 RX ORDER — MEMANTINE HYDROCHLORIDE 5 MG/1
5 TABLET ORAL 2 TIMES DAILY
Qty: 60 TABLET | Refills: 5 | Status: SHIPPED | OUTPATIENT
Start: 2018-08-01 | End: 2019-02-13 | Stop reason: SDUPTHER
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
## 2018-08-22 DIAGNOSIS — R06.83 SNORING: ICD-10-CM

## 2018-11-25 ENCOUNTER — HOSPITAL ENCOUNTER (EMERGENCY)
Age: 33
Discharge: HOME | End: 2018-11-25
Payer: COMMERCIAL

## 2018-11-25 VITALS
SYSTOLIC BLOOD PRESSURE: 127 MMHG | HEIGHT: 64 IN | OXYGEN SATURATION: 97 % | DIASTOLIC BLOOD PRESSURE: 90 MMHG | RESPIRATION RATE: 15 BRPM | TEMPERATURE: 97.2 F | HEART RATE: 100 BPM | WEIGHT: 185 LBS | BODY MASS INDEX: 31.58 KG/M2

## 2018-11-25 DIAGNOSIS — K08.89 PAIN, DENTAL: Primary | ICD-10-CM

## 2018-11-25 PROCEDURE — 99283 EMERGENCY DEPT VISIT LOW MDM: CPT | Performed by: NURSE PRACTITIONER

## 2018-11-25 PROCEDURE — 99282 EMERGENCY DEPT VISIT SF MDM: CPT

## 2018-11-25 PROCEDURE — 2500000003 HC RX 250 WO HCPCS: Performed by: NURSE PRACTITIONER

## 2018-11-25 PROCEDURE — 64400 NJX AA&/STRD TRIGEMINAL NRV: CPT | Performed by: NURSE PRACTITIONER

## 2018-11-25 PROCEDURE — 64400 NJX AA&/STRD TRIGEMINAL NRV: CPT

## 2018-11-25 RX ORDER — LIDOCAINE HYDROCHLORIDE 10 MG/ML
5 INJECTION, SOLUTION EPIDURAL; INFILTRATION; INTRACAUDAL; PERINEURAL ONCE
Status: COMPLETED | OUTPATIENT
Start: 2018-11-25 | End: 2018-11-25

## 2018-11-25 RX ORDER — MEMANTINE HYDROCHLORIDE 5 MG/1
5 TABLET ORAL
COMMUNITY
Start: 2018-08-01 | End: 2019-08-01

## 2018-11-25 RX ORDER — NAPROXEN 500 MG/1
500 TABLET ORAL 2 TIMES DAILY
Qty: 20 TABLET | Refills: 0 | Status: SHIPPED | OUTPATIENT
Start: 2018-11-25 | End: 2022-02-02 | Stop reason: SDUPTHER

## 2018-11-25 RX ORDER — PENICILLIN V POTASSIUM 500 MG/1
500 TABLET ORAL 3 TIMES DAILY
Qty: 30 TABLET | Refills: 0 | Status: SHIPPED | OUTPATIENT
Start: 2018-11-25 | End: 2018-12-05

## 2018-11-25 RX ORDER — HYDROCODONE BITARTRATE AND ACETAMINOPHEN 5; 325 MG/1; MG/1
1 TABLET ORAL EVERY 6 HOURS PRN
Qty: 12 TABLET | Refills: 0 | Status: SHIPPED | OUTPATIENT
Start: 2018-11-25 | End: 2018-11-28

## 2018-11-25 RX ADMIN — LIDOCAINE HYDROCHLORIDE 5 ML: 10 INJECTION, SOLUTION EPIDURAL; INFILTRATION; INTRACAUDAL at 21:17

## 2018-11-25 ASSESSMENT — PAIN SCALES - GENERAL: PAINLEVEL_OUTOF10: 8

## 2018-11-26 NOTE — ED PROVIDER NOTES
performed by Gabby Whitlock MD at [address] [facility]  2005    VENTRICULOPERITONEAL [place]       Discharge Medication List as of 11/25/2018  9:06 PM      CONTINUE these medications which have NOT CHANGED    Details   memantine (NAMENDA) 5 MG tablet Take 5 mg by mouthHistorical Med      verapamil (VERELAN) 120 MG extended release capsule Take 120 mg by mouth nightly MIGRAINESHistorical Med      ondansetron (ZOFRAN) 8 MG tablet DISSOLVE 1 TABLET IN MOUTH EVERY 8 HOURS AS NEEDED FOR NAUSEA AND VOMITING, R-0Historical Med             ALLERGIES     Patient has no known allergies. FAMILY HISTORY       Family History   Problem Relation Age of Onset    High Blood Pressure Mother     High Blood Pressure Maternal Grandfather     High Cholesterol Maternal Grandfather     Heart Disease Maternal Grandfather           SOCIAL HISTORY       Social History     Social History    Marital status:      Spouse name: N/A    Number of children: N/A    Years of education: N/A     Social History Main Topics    Smoking status: Former Smoker     Packs/day: 0.50     Years: 0.00     Quit date: 1/1/2012    Smokeless tobacco: Never Used      Comment: pt vapes    Alcohol use No    Drug use: No    Sexual activity: Not Asked     Other Topics Concern    None     Social History Narrative    None       SCREENINGS             PHYSICAL EXAM    (up to 7 for level 4, 8 or more for level 5)     ED Triage Vitals   BP Temp Temp Source Pulse Resp SpO2 Height Weight   11/25/18 1932 11/25/18 1932 11/25/18 1932 11/25/18 1932 11/25/18 1932 11/25/18 1932 11/25/18 1929 11/25/18 1929   (!) 139/100 97.2 °F (36.2 °C) Temporal 124 20 98 % 5' 4\" (1.626 m) 185 lb (83.9 kg)       Physical Exam   Constitutional: She is oriented to person, place, and time. She appears well-nourished. HENT:   Head: Normocephalic.    Right Ear: External ear normal.   Left Ear: External ear normal.   Mouth/Throat: Oropharynx is clear and moist.   Multiple teeth with severe decay, no gingival abscess noted   Eyes: Pupils are equal, round, and reactive to light. Neck: Normal range of motion. Cardiovascular: Normal rate. Pulmonary/Chest: Effort normal.   Lymphadenopathy:     She has no cervical adenopathy. Neurological: She is alert and oriented to person, place, and time. Vitals reviewed. DIAGNOSTIC RESULTS     EKG: All EKG's are interpreted by the Emergency Department Physician who either signs or Co-signs this chart in the absence of acardiologist.        RADIOLOGY:   Non-plain film images such as CT, Ultrasound andMRI are read by the radiologist. Plain radiographic images are visualized and preliminarily interpreted by the emergency physician with the below findings:        Interpretation per the Radiologist below, if available at the time of this note:    No orders to display         ED BEDSIDE ULTRASOUND:   Performed by ED Physician - none    LABS:  Labs Reviewed - No data to display    All other labs were within normal range or not returned as of this dictation. RE-ASSESSMENT           EMERGENCY DEPARTMENT COURSE and DIFFERENTIALDIAGNOSIS/MDM:   Vitals:    Vitals:    11/25/18 1929 11/25/18 1932 11/25/18 2109   BP:  (!) 139/100 (!) 127/90   Pulse:  124 100   Resp:  20 15   Temp:  97.2 °F (36.2 °C) 97.2 °F (36.2 °C)   TempSrc:  Temporal Temporal   SpO2:  98% 97%   Weight: 185 lb (83.9 kg)     Height: 5' 4\" (1.626 m)         MDM      CONSULTS:  None    PROCEDURES:  Unless otherwise notedbelow, none     Dental Nerve Block Procedure  Date/Time: 11/25/2018 9:03 PM  Performed by: Yesy Ashford  Authorized by: Yesy Ashford   Consent: Verbal consent obtained.   Risks and benefits: risks, benefits and alternatives were discussed  Consent given by: patient  Patient identity confirmed: verbally with patient  Local anesthesia used: yes  Anesthesia: nerve block    Anesthesia:  Local anesthesia used: yes  Local Anesthetic: lidocaine 1% without epinephrine and bupivacaine 0.5% without epinephrine  Anesthetic total: 5 mL    Sedation:  Patient sedated: no  Patient tolerance: Patient tolerated the procedure well with no immediate complications  Comments: Left inferior alveolar nerve block          FINAL IMPRESSION     1. Pain, dental          DISPOSITION/PLAN   DISPOSITION Decision To Discharge 11/25/2018 09:03:41 PM      PATIENT REFERRED TO:  [address], [address] [place] [address]  [phone]  M-F 8am to 430pm  Schedule an appointment as soon as possible for a visit         DISCHARGE MEDICATIONS:    Attestation: The Prescription Monitoring Report for this patient was reviewed today. ([id number]) MARIO Bullard)  Documentation: No signs of potential drug abuse or diversion identified. MARIO Bullard)  Discharge Medication List as of 11/25/2018  9:06 PM           Medication List      START taking these medications    HYDROcodone-acetaminophen 5-325 MG per tablet  Commonly known as:  NORCO  Take 1 tablet by mouth every 6 hours as needed for Pain for up to 3 days. Intended supply: 3 days. Take lowest dose possible to manage pain.      naproxen 500 MG tablet  Commonly known as:  NAPROSYN  Take 1 tablet by mouth 2 times daily for 10 days     penicillin v potassium 500 MG tablet  Commonly known as:  VEETID  Take 1 tablet by mouth 3 times daily for 10 days        ASK your doctor about these medications    memantine 5 MG tablet  Commonly known as:  NAMENDA     ondansetron 8 MG tablet  Commonly known as:  ZOFRAN     verapamil 120 MG extended release capsule  Commonly known as:  VERELAN           Where to Get Your Medications      You can get these medications from any pharmacy    Bring a paper prescription for each of these medications  · HYDROcodone-acetaminophen 5-325 MG per tablet  · naproxen 500 MG tablet  · penicillin v potassium 500 MG tablet         (Pleasenote that portions of this note were completed

## 2019-02-13 ENCOUNTER — OFFICE VISIT (OUTPATIENT)
Dept: NEUROLOGY | Facility: CLINIC | Age: 34
End: 2019-02-13

## 2019-02-13 VITALS
WEIGHT: 193 LBS | BODY MASS INDEX: 32.15 KG/M2 | SYSTOLIC BLOOD PRESSURE: 128 MMHG | DIASTOLIC BLOOD PRESSURE: 70 MMHG | HEART RATE: 74 BPM | HEIGHT: 65 IN

## 2019-02-13 DIAGNOSIS — Z98.2 S/P VENTRICULOPERITONEAL SHUNT: ICD-10-CM

## 2019-02-13 DIAGNOSIS — G44.40 MEDICATION OVERUSE HEADACHE: Primary | ICD-10-CM

## 2019-02-13 DIAGNOSIS — G93.2 PSEUDOTUMOR CEREBRI: ICD-10-CM

## 2019-02-13 DIAGNOSIS — G43.719 INTRACTABLE CHRONIC MIGRAINE WITHOUT AURA AND WITHOUT STATUS MIGRAINOSUS: ICD-10-CM

## 2019-02-13 PROCEDURE — 99213 OFFICE O/P EST LOW 20 MIN: CPT | Performed by: CLINICAL NURSE SPECIALIST

## 2019-02-13 RX ORDER — LEVETIRACETAM 250 MG/1
250 TABLET ORAL 2 TIMES DAILY
Qty: 60 TABLET | Refills: 2 | Status: SHIPPED | OUTPATIENT
Start: 2019-02-13 | End: 2019-05-01 | Stop reason: SDUPTHER

## 2019-02-13 RX ORDER — NARATRIPTAN 2.5 MG/1
TABLET ORAL
Qty: 20 TABLET | Refills: 0 | Status: SHIPPED | OUTPATIENT
Start: 2019-02-13 | End: 2019-04-11 | Stop reason: SDUPTHER

## 2019-02-13 RX ORDER — MEMANTINE HYDROCHLORIDE 5 MG/1
5 TABLET ORAL 2 TIMES DAILY
Qty: 60 TABLET | Refills: 5 | Status: SHIPPED | OUTPATIENT
Start: 2019-02-13 | End: 2020-02-13
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
## 2019-02-13 NOTE — PATIENT INSTRUCTIONS

## 2019-04-11 RX ORDER — NARATRIPTAN 2.5 MG/1
TABLET ORAL
Qty: 20 TABLET | Refills: 0 | Status: SHIPPED | OUTPATIENT
Start: 2019-04-11 | End: 2021-01-07

## 2019-05-01 ENCOUNTER — OFFICE VISIT (OUTPATIENT)
Dept: NEUROLOGY | Facility: CLINIC | Age: 34
End: 2019-05-01

## 2019-05-01 VITALS
BODY MASS INDEX: 31.65 KG/M2 | DIASTOLIC BLOOD PRESSURE: 78 MMHG | HEART RATE: 76 BPM | HEIGHT: 65 IN | SYSTOLIC BLOOD PRESSURE: 124 MMHG | WEIGHT: 190 LBS

## 2019-05-01 DIAGNOSIS — R20.0 NUMBNESS AND TINGLING IN RIGHT HAND: Primary | ICD-10-CM

## 2019-05-01 DIAGNOSIS — G93.2 PSEUDOTUMOR CEREBRI: ICD-10-CM

## 2019-05-01 DIAGNOSIS — R20.2 NUMBNESS AND TINGLING IN RIGHT HAND: Primary | ICD-10-CM

## 2019-05-01 DIAGNOSIS — Z98.2 S/P VENTRICULOPERITONEAL SHUNT: ICD-10-CM

## 2019-05-01 DIAGNOSIS — G43.719 INTRACTABLE CHRONIC MIGRAINE WITHOUT AURA AND WITHOUT STATUS MIGRAINOSUS: ICD-10-CM

## 2019-05-01 PROCEDURE — 99214 OFFICE O/P EST MOD 30 MIN: CPT | Performed by: CLINICAL NURSE SPECIALIST

## 2019-05-01 RX ORDER — LEVETIRACETAM 250 MG/1
250 TABLET ORAL 2 TIMES DAILY
Qty: 60 TABLET | Refills: 5 | Status: SHIPPED | OUTPATIENT
Start: 2019-05-01 | End: 2021-01-07

## 2019-05-01 NOTE — PATIENT INSTRUCTIONS
Carpal Tunnel Syndrome  Carpal tunnel syndrome is a condition that causes pain in your hand and arm. The carpal tunnel is a narrow area located on the palm side of your wrist. Repeated wrist motion or certain diseases may cause swelling within the tunnel. This swelling pinches the main nerve in the wrist (median nerve).  What are the causes?  This condition may be caused by:  · Repeated wrist motions.  · Wrist injuries.  · Arthritis.  · A cyst or tumor in the carpal tunnel.  · Fluid buildup during pregnancy.    Sometimes the cause of this condition is not known.  What increases the risk?  This condition is more likely to develop in:  · People who have jobs that cause them to repeatedly move their wrists in the same motion, such as butchers and cashiers.  · Women.  · People with certain conditions, such as:  ? Diabetes.  ? Obesity.  ? An underactive thyroid (hypothyroidism).  ? Kidney failure.    What are the signs or symptoms?  Symptoms of this condition include:  · A tingling feeling in your fingers, especially in your thumb, index, and middle fingers.  · Tingling or numbness in your hand.  · An aching feeling in your entire arm, especially when your wrist and elbow are bent for long periods of time.  · Wrist pain that goes up your arm to your shoulder.  · Pain that goes down into your palm or fingers.  · A weak feeling in your hands. You may have trouble grabbing and holding items.    Your symptoms may feel worse during the night.  How is this diagnosed?  This condition is diagnosed with a medical history and physical exam. You may also have tests, including:  · An electromyogram (EMG). This test measures electrical signals sent by your nerves into the muscles.  · X-rays.    How is this treated?  Treatment for this condition includes:  · Lifestyle changes. It is important to stop doing or modify the activity that caused your condition.  · Physical or occupational therapy.  · Medicines for pain and inflammation.  This may include medicine that is injected into your wrist.  · A wrist splint.  · Surgery.    Follow these instructions at home:  If you have a splint:  · Wear it as told by your health care provider. Remove it only as told by your health care provider.  · Loosen the splint if your fingers become numb and tingle, or if they turn cold and blue.  · Keep the splint clean and dry.  General instructions  · Take over-the-counter and prescription medicines only as told by your health care provider.  · Rest your wrist from any activity that may be causing your pain. If your condition is work related, talk to your employer about changes that can be made, such as getting a wrist pad to use while typing.  · If directed, apply ice to the painful area:  ? Put ice in a plastic bag.  ? Place a towel between your skin and the bag.  ? Leave the ice on for 20 minutes, 2-3 times per day.  · Keep all follow-up visits as told by your health care provider. This is important.  · Do any exercises as told by your health care provider, physical therapist, or occupational therapist.  Contact a health care provider if:  · You have new symptoms.  · Your pain is not controlled with medicines.  · Your symptoms get worse.  This information is not intended to replace advice given to you by your health care provider. Make sure you discuss any questions you have with your health care provider.  Document Released: 12/15/2001 Document Revised: 07/18/2018 Document Reviewed: 05/04/2016  [employer] Patient Education © 2019 [employer]  BMI for Adults  Body mass index (BMI) is a number that is calculated from a person's weight and height. In most adults, the number is used to find how much of an adult's weight is made up of fat. BMI is not as accurate as a direct measure of body fat.  How is BMI calculated?  BMI is calculated by dividing weight in kilograms by height in meters squared. It can also be calculated by dividing weight in pounds by  height in inches squared, then multiplying the resulting number by 703. Charts are available to help you find your BMI quickly and easily without doing this calculation.  How is BMI interpreted?  Health care professionals use BMI charts to identify whether an adult is underweight, at a normal weight, or overweight based on the following guidelines:  · Underweight: BMI less than 18.5.  · Normal weight: BMI between 18.5 and 24.9.  · Overweight: BMI between 25 and 29.9.  · Obese: BMI of 30 and above.    BMI is usually interpreted the same for males and females.  Weight includes both fat and muscle, so someone with a muscular build, such as an athlete, may have a BMI that is higher than 24.9. In cases like these, BMI may not accurately depict body fat. To determine if excess body fat is the cause of a BMI of 25 or higher, further assessments may need to be done by a health care provider.  Why is BMI a useful tool?  BMI is used to identify a possible weight problem that may be related to a medical problem or may increase the risk for medical problems. BMI can also be used to promote changes to reach a healthy weight.  This information is not intended to replace advice given to you by your health care provider. Make sure you discuss any questions you have with your health care provider.  Document Released: 08/29/2005 Document Revised: 04/27/2017 Document Reviewed: 05/15/2015  [employer] Interactive Patient Education © 2018 [employer]
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
## 2019-05-29 ENCOUNTER — APPOINTMENT (OUTPATIENT)
Dept: NEUROLOGY | Facility: HOSPITAL | Age: 34
End: 2019-05-29

## 2019-06-20 ENCOUNTER — TELEPHONE (OUTPATIENT)
Dept: NEUROLOGY | Facility: CLINIC | Age: 34
End: 2019-06-20

## 2019-06-20 NOTE — TELEPHONE ENCOUNTER
I did call to speak with Sandy about Botox. She has a high deductible and her out of pocket for the Botox is $2800. She is not able to afford that at this time. She does not with to proceed with Botox.

## 2019-07-29 DIAGNOSIS — R20.0 NUMBNESS AND TINGLING IN RIGHT HAND: ICD-10-CM

## 2019-07-29 DIAGNOSIS — R20.2 NUMBNESS AND TINGLING IN RIGHT HAND: ICD-10-CM

## 2020-12-10 ENCOUNTER — TELEPHONE (OUTPATIENT)
Dept: OBSTETRICS AND GYNECOLOGY | Facility: CLINIC | Age: 35
End: 2020-12-10

## 2020-12-10 NOTE — TELEPHONE ENCOUNTER
OB less than 4wks c/o occasional brown spotting. Informed pt that is normal considering she is only 1-2 days late on her period and just found out she is pregnant. Instructed pt to monitor the spotting and if it turns bright red, increases in amount, or pt begins cramping to call the office back. Pt v/u.

## 2021-01-07 ENCOUNTER — INITIAL PRENATAL (OUTPATIENT)
Dept: OBSTETRICS AND GYNECOLOGY | Facility: CLINIC | Age: 36
End: 2021-01-07

## 2021-01-07 VITALS — DIASTOLIC BLOOD PRESSURE: 78 MMHG | WEIGHT: 190 LBS | BODY MASS INDEX: 31.62 KG/M2 | SYSTOLIC BLOOD PRESSURE: 108 MMHG

## 2021-01-07 DIAGNOSIS — Z12.4 SCREENING FOR CERVICAL CANCER: ICD-10-CM

## 2021-01-07 DIAGNOSIS — O09.521 MULTIGRAVIDA OF ADVANCED MATERNAL AGE IN FIRST TRIMESTER: ICD-10-CM

## 2021-01-07 DIAGNOSIS — Z34.81 ENCOUNTER FOR SUPERVISION OF OTHER NORMAL PREGNANCY IN FIRST TRIMESTER: Primary | ICD-10-CM

## 2021-01-07 DIAGNOSIS — G93.2 PSEUDOTUMOR CEREBRI: ICD-10-CM

## 2021-01-07 PROBLEM — Z34.90 SUPERVISION OF NORMAL PREGNANCY: Status: ACTIVE | Noted: 2021-01-07

## 2021-01-07 PROCEDURE — 87624 HPV HI-RISK TYP POOLED RSLT: CPT | Performed by: OBSTETRICS & GYNECOLOGY

## 2021-01-07 PROCEDURE — G0123 SCREEN CERV/VAG THIN LAYER: HCPCS | Performed by: OBSTETRICS & GYNECOLOGY

## 2021-01-07 PROCEDURE — 0501F PRENATAL FLOW SHEET: CPT | Performed by: OBSTETRICS & GYNECOLOGY

## 2021-01-07 RX ORDER — CITALOPRAM 20 MG/1
TABLET ORAL
COMMUNITY
Start: 2020-12-06 | End: 2021-02-12 | Stop reason: SDUPTHER

## 2021-01-07 RX ORDER — PRENATAL VIT NO.126/IRON/FOLIC 28MG-0.8MG
1 TABLET ORAL DAILY
COMMUNITY
End: 2021-08-12

## 2021-01-07 NOTE — PROGRESS NOTES
New OB, US today shows 8 weeks gestation, EDC 8/17/21  Prior uncomplicated pregnancy and forceps assited vaginal delivery  Has  shunt for pseudotumor, having headaches  Having nausea, fatigue  New OB labs today  Discussed OTC Unisom and B6  Referral to neurosurgery   Discussed normal prenatal routines  Questions answered

## 2021-01-11 LAB
GEN CATEG CVX/VAG CYTO-IMP: ABNORMAL
HPV I/H RISK 4 DNA CVX QL PROBE+SIG AMP: NOT DETECTED
LAB AP CASE REPORT: ABNORMAL
LAB AP GYN ADDITIONAL INFORMATION: ABNORMAL
LAB AP GYN OTHER FINDINGS: ABNORMAL
PATH INTERP SPEC-IMP: ABNORMAL
STAT OF ADQ CVX/VAG CYTO-IMP: ABNORMAL

## 2021-01-13 LAB
ABO GROUP BLD: NORMAL
BACTERIA UR CULT: NORMAL
BACTERIA UR CULT: NORMAL
BASOPHILS # BLD AUTO: 0.07 10*3/MM3 (ref 0–0.2)
BASOPHILS NFR BLD AUTO: 0.4 % (ref 0–1.5)
BLD GP AB SCN SERPL QL: NEGATIVE
C TRACH RRNA SPEC QL NAA+PROBE: NEGATIVE
DRUGS UR: NORMAL
EOSINOPHIL # BLD AUTO: 0.15 10*3/MM3 (ref 0–0.4)
EOSINOPHIL NFR BLD AUTO: 0.9 % (ref 0.3–6.2)
ERYTHROCYTE [DISTWIDTH] IN BLOOD BY AUTOMATED COUNT: 13.4 % (ref 12.3–15.4)
HBV SURFACE AG SERPL QL IA: NEGATIVE
HCT VFR BLD AUTO: 40.1 % (ref 34–46.6)
HGB BLD-MCNC: 14.2 G/DL (ref 12–15.9)
HIV 1+2 AB+HIV1 P24 AG SERPL QL IA: NON REACTIVE
IMM GRANULOCYTES # BLD AUTO: 0.42 10*3/MM3 (ref 0–0.05)
IMM GRANULOCYTES NFR BLD AUTO: 2.4 % (ref 0–0.5)
LYMPHOCYTES # BLD AUTO: 2.07 10*3/MM3 (ref 0.7–3.1)
LYMPHOCYTES NFR BLD AUTO: 11.9 % (ref 19.6–45.3)
MCH RBC QN AUTO: 32.3 PG (ref 26.6–33)
MCHC RBC AUTO-ENTMCNC: 35.4 G/DL (ref 31.5–35.7)
MCV RBC AUTO: 91.1 FL (ref 79–97)
MONOCYTES # BLD AUTO: 0.67 10*3/MM3 (ref 0.1–0.9)
MONOCYTES NFR BLD AUTO: 3.9 % (ref 5–12)
N GONORRHOEA RRNA SPEC QL NAA+PROBE: NEGATIVE
NEUTROPHILS # BLD AUTO: 13.97 10*3/MM3 (ref 1.7–7)
NEUTROPHILS NFR BLD AUTO: 80.5 % (ref 42.7–76)
NRBC BLD AUTO-RTO: 0.1 /100 WBC (ref 0–0.2)
PLATELET # BLD AUTO: 259 10*3/MM3 (ref 140–450)
RBC # BLD AUTO: 4.4 10*6/MM3 (ref 3.77–5.28)
RH BLD: POSITIVE
RPR SER QL: NON REACTIVE
RUBV IGG SERPL IA-ACNC: 1.72 INDEX
WBC # BLD AUTO: 17.35 10*3/MM3 (ref 3.4–10.8)

## 2021-02-04 DIAGNOSIS — O21.9 NAUSEA AND VOMITING IN PREGNANCY: ICD-10-CM

## 2021-02-04 DIAGNOSIS — R11.2 NAUSEA AND VOMITING, INTRACTABILITY OF VOMITING NOT SPECIFIED, UNSPECIFIED VOMITING TYPE: Primary | ICD-10-CM

## 2021-02-04 RX ORDER — METOCLOPRAMIDE 10 MG/1
10 TABLET ORAL
Qty: 120 TABLET | Refills: 0 | Status: SHIPPED | OUTPATIENT
Start: 2021-02-04 | End: 2021-03-06

## 2021-02-04 NOTE — TELEPHONE ENCOUNTER
Pt called and stated she needed something called in for nausea, states she tried Unisom and B6 as well as preggie pops with no relief.

## 2021-02-09 ENCOUNTER — ROUTINE PRENATAL (OUTPATIENT)
Dept: OBSTETRICS AND GYNECOLOGY | Facility: CLINIC | Age: 36
End: 2021-02-09

## 2021-02-09 VITALS — SYSTOLIC BLOOD PRESSURE: 122 MMHG | WEIGHT: 202 LBS | BODY MASS INDEX: 33.61 KG/M2 | DIASTOLIC BLOOD PRESSURE: 84 MMHG

## 2021-02-09 DIAGNOSIS — G93.2 PSEUDOTUMOR CEREBRI: ICD-10-CM

## 2021-02-09 DIAGNOSIS — O09.522 MULTIGRAVIDA OF ADVANCED MATERNAL AGE IN SECOND TRIMESTER: Primary | ICD-10-CM

## 2021-02-09 PROBLEM — O09.521 MULTIGRAVIDA OF ADVANCED MATERNAL AGE IN FIRST TRIMESTER: Status: RESOLVED | Noted: 2021-01-07 | Resolved: 2021-02-09

## 2021-02-09 LAB
GLUCOSE UR STRIP-MCNC: ABNORMAL MG/DL
PROT UR STRIP-MCNC: ABNORMAL MG/DL

## 2021-02-09 PROCEDURE — 57452 EXAM OF CERVIX W/SCOPE: CPT | Performed by: OBSTETRICS & GYNECOLOGY

## 2021-02-09 RX ORDER — CITALOPRAM 20 MG/1
20 TABLET ORAL DAILY
Qty: 30 TABLET | Refills: 0 | OUTPATIENT
Start: 2021-02-09

## 2021-02-09 NOTE — PROGRESS NOTES
Sandy Page is a 35 y.o. female  here today for colposcopy.  Her most recent Pap smear was read as ASCUS-H but HPV negative.  She is 13 weeks pregnant.    /84   Wt 91.6 kg (202 lb)   BMI 33.61 kg/m²      A urine pregnancy test in the office today was negative.    Colposcopy was performed in the office today.  She was placed in the lithotomy position on the exam table.  A speculum was inserted and the cervix well visualized.  The cervix was cleansed with acetic acid swabs.  Inspection with the colposcope showed the transformation zone on the ectocervix.  It was seen in its entirety.  There were no acetowhite lesions noted.  There was some active metaplasia noted.  Colposcopy was satisfactory. No biopsy was performed.  An endocervical curettage was not performed.  She tolerated the procedure well.    Colposcopic impression: normal colposcopy    She will follow up in 4 weeks for prenatal care. ffDNA today

## 2021-02-12 ENCOUNTER — OFFICE VISIT (OUTPATIENT)
Dept: FAMILY MEDICINE CLINIC | Facility: CLINIC | Age: 36
End: 2021-02-12

## 2021-02-12 VITALS
OXYGEN SATURATION: 97 % | HEART RATE: 102 BPM | TEMPERATURE: 96.6 F | WEIGHT: 205 LBS | BODY MASS INDEX: 35 KG/M2 | DIASTOLIC BLOOD PRESSURE: 86 MMHG | HEIGHT: 64 IN | SYSTOLIC BLOOD PRESSURE: 125 MMHG

## 2021-02-12 DIAGNOSIS — Z3A.13 13 WEEKS GESTATION OF PREGNANCY: ICD-10-CM

## 2021-02-12 DIAGNOSIS — F32.A ANXIETY AND DEPRESSION: Primary | ICD-10-CM

## 2021-02-12 DIAGNOSIS — F41.9 ANXIETY AND DEPRESSION: Primary | ICD-10-CM

## 2021-02-12 PROBLEM — N20.0 RENAL CALCULUS, RIGHT: Status: ACTIVE | Noted: 2018-03-04

## 2021-02-12 PROCEDURE — 99214 OFFICE O/P EST MOD 30 MIN: CPT | Performed by: NURSE PRACTITIONER

## 2021-02-12 RX ORDER — CITALOPRAM 20 MG/1
20 TABLET ORAL DAILY
Qty: 30 TABLET | Refills: 2 | Status: SHIPPED | OUTPATIENT
Start: 2021-02-12 | End: 2021-04-28 | Stop reason: SDUPTHER

## 2021-02-12 NOTE — PROGRESS NOTES
"Chief Complaint  Anxiety (does much better on meds, states it could probably be upped a little bit but thinks its probably due to pregnancy)    Subjective    History of Present Illness      Patient presents to [facility] for   Anxiety  Presents for follow-up visit. Symptoms include irritability. Symptoms occur occasionally. The quality of sleep is poor. Nighttime awakenings: several.            Review of Systems   Constitutional: Positive for irritability.   Psychiatric/Behavioral: Positive for agitation and sleep disturbance.       I have reviewed and agree with the HPI and ROS information as above.  Andie Burleson, DEBI     Objective   Vital Signs:   /86 (BP Location: Left arm, Patient Position: Sitting)   Pulse 102   Temp 96.6 °F (35.9 °C)   Ht 162.6 cm (64\")   Wt 93 kg (205 lb)   SpO2 97%   BMI 35.19 kg/m²       Physical Exam  Constitutional:       Appearance: Normal appearance. She is well-developed.   HENT:      Head: Normocephalic and atraumatic.      Right Ear: External ear normal.      Left Ear: External ear normal.      Nose: Nose normal. No nasal tenderness or congestion.      Mouth/Throat:      Lips: Pink. No lesions.      Mouth: Mucous membranes are moist. No oral lesions.      Dentition: Normal dentition.      Pharynx: Oropharynx is clear. No pharyngeal swelling, oropharyngeal exudate or posterior oropharyngeal erythema.   Eyes:      General: Lids are normal. Vision grossly intact. No scleral icterus.        Right eye: No discharge.         Left eye: No discharge.      Extraocular Movements: Extraocular movements intact.      Conjunctiva/sclera: Conjunctivae normal.      Right eye: Right conjunctiva is not injected.      Left eye: Left conjunctiva is not injected.      Pupils: Pupils are equal, round, and reactive to light.   Neck:      Musculoskeletal: Full passive range of motion without pain, normal range of motion and neck supple. "   Cardiovascular:      Rate and Rhythm: Normal rate and regular rhythm.      Heart sounds: Normal heart sounds. No murmur. No gallop.    Pulmonary:      Effort: Pulmonary effort is normal.      Breath sounds: Normal breath sounds and air entry. No wheezing, rhonchi or rales.   Musculoskeletal: Normal range of motion.         General: No tenderness or deformity.      Right lower leg: No edema.      Left lower leg: No edema.   Skin:     General: Skin is warm and dry.      Coloration: Skin is not jaundiced.      Findings: No rash.   Neurological:      Mental Status: She is alert and oriented to person, place, and time.      Cranial Nerves: Cranial nerves are intact.      Sensory: Sensation is intact.      Motor: Motor function is intact.      Coordination: Coordination is intact.      Gait: Gait is intact.   Psychiatric:         Attention and Perception: Attention normal.         Mood and Affect: Mood and affect normal.         Behavior: Behavior is not hyperactive. Behavior is cooperative.         Thought Content: Thought content normal.         Judgment: Judgment normal.          Result Review  Data Reviewed:                   Assessment and Plan      Problem List Items Addressed This Visit        Mental Health    Anxiety and depression - Primary    Relevant Medications    citalopram (CeleXA) 20 MG tablet      Other Visit Diagnoses     13 weeks gestation of pregnancy          Patient is here today requesting refills on her Celexa.  Patient is having some increased emotions but she feels like that is probably pregnancy related.  Patient states that her OB is Dr. Morel.  She states that she has addressed the medication with him.  She states that he was okay with her being on it.  I do not see in any of his notes where this has been mentioned.  I discussed the caution advised recommendations from [facility].  I questioned if she felt like that she needed to take this medication while she is pregnant.  She has  questioned this her self.  I recommended that she discuss this with her OB and potentially even get further education from maternal-fetal medicine.  Patient voices understanding.  She does request to go ahead and have prescription sent in and will discuss with her OB on Monday.        Follow Up   Return in about 3 months (around 5/12/2021).  Patient was given instructions and counseling regarding her condition or for health maintenance advice. Please see specific information pulled into the AVS if appropriate.

## 2021-02-23 DIAGNOSIS — R89.9 ABNORMAL LABORATORY TEST RESULT: Primary | ICD-10-CM

## 2021-02-24 NOTE — PROGRESS NOTES
"The statement about \"A urine pregnancy test in the office today was negative.\" in the previous note on 2/9/21 is an error and should not be included in the note. Please disregard as patient is clearly pregnant.  "

## 2021-03-09 ENCOUNTER — ROUTINE PRENATAL (OUTPATIENT)
Dept: OBSTETRICS AND GYNECOLOGY | Facility: CLINIC | Age: 36
End: 2021-03-09

## 2021-03-09 VITALS — SYSTOLIC BLOOD PRESSURE: 102 MMHG | BODY MASS INDEX: 35.36 KG/M2 | DIASTOLIC BLOOD PRESSURE: 72 MMHG | WEIGHT: 206 LBS

## 2021-03-09 DIAGNOSIS — O09.522 MULTIGRAVIDA OF ADVANCED MATERNAL AGE IN SECOND TRIMESTER: Primary | ICD-10-CM

## 2021-03-09 LAB
GLUCOSE UR STRIP-MCNC: NEGATIVE MG/DL
PROT UR STRIP-MCNC: NEGATIVE MG/DL

## 2021-03-09 PROCEDURE — 0502F SUBSEQUENT PRENATAL CARE: CPT | Performed by: OBSTETRICS & GYNECOLOGY

## 2021-03-09 RX ORDER — METOCLOPRAMIDE 10 MG/1
10 TABLET ORAL AS NEEDED
COMMUNITY
Start: 2021-02-04 | End: 2021-05-06 | Stop reason: SDUPTHER

## 2021-03-09 NOTE — PROGRESS NOTES
Starting to feel fetal movement  Feeling well  Gender peek today, BOY!  Reviewed ffDNA, was insufficient  Reviewed MFM note showing normal US and they repeated ffDNA, again insufficient  Schedule anatomy US 3/29    Diagnoses and all orders for this visit:    1. Multigravida of advanced maternal age in second trimester (Primary)

## 2021-03-26 ENCOUNTER — OFFICE VISIT (OUTPATIENT)
Dept: FAMILY MEDICINE CLINIC | Facility: CLINIC | Age: 36
End: 2021-03-26

## 2021-03-26 VITALS
TEMPERATURE: 97.7 F | DIASTOLIC BLOOD PRESSURE: 90 MMHG | HEIGHT: 64 IN | RESPIRATION RATE: 20 BRPM | WEIGHT: 206 LBS | HEART RATE: 108 BPM | BODY MASS INDEX: 35.17 KG/M2 | SYSTOLIC BLOOD PRESSURE: 132 MMHG

## 2021-03-26 DIAGNOSIS — O09.522 MULTIGRAVIDA OF ADVANCED MATERNAL AGE IN SECOND TRIMESTER: ICD-10-CM

## 2021-03-26 DIAGNOSIS — F32.A ANXIETY AND DEPRESSION: Primary | ICD-10-CM

## 2021-03-26 DIAGNOSIS — F41.9 ANXIETY AND DEPRESSION: Primary | ICD-10-CM

## 2021-03-26 PROBLEM — O28.5 ABNORMAL CHROMOSOMAL AND GENETIC FINDING ON ANTENATAL SCREENING MOTHER: Status: ACTIVE | Noted: 2021-02-24

## 2021-03-26 PROBLEM — O09.529 AMA (ADVANCED MATERNAL AGE) MULTIGRAVIDA 35+, UNSPECIFIED TRIMESTER: Status: ACTIVE | Noted: 2021-02-24

## 2021-03-26 PROCEDURE — 99214 OFFICE O/P EST MOD 30 MIN: CPT | Performed by: NURSE PRACTITIONER

## 2021-03-26 NOTE — PROGRESS NOTES
"Chief Complaint  Anxiety    Subjective    History of Present Illness      Patient presents to [facility] for   Pt states that she is here to have ADA forms filled out due to a situation with employment. PT states that she have severe anxiety and it's causing her to be delayed with some of her job duties.    Anxiety  Symptoms include nervous/anxious behavior.            Review of Systems   Psychiatric/Behavioral: The patient is nervous/anxious.        I have reviewed and agree with the HPI and ROS information as above.  Andieflores Burleson, DEBI     Objective   Vital Signs:   /90   Pulse 108   Temp 97.7 °F (36.5 °C)   Resp 20   Ht 162.6 cm (64\")   Wt 93.4 kg (206 lb)   BMI 35.36 kg/m²       Physical Exam  Constitutional:       Appearance: Normal appearance. She is well-developed.   HENT:      Head: Normocephalic and atraumatic.      Right Ear: External ear normal.      Left Ear: External ear normal.      Nose: Nose normal. No nasal tenderness or congestion.      Mouth/Throat:      Lips: Pink. No lesions.      Mouth: Mucous membranes are moist. No oral lesions.      Dentition: Normal dentition.      Pharynx: Oropharynx is clear. No pharyngeal swelling, oropharyngeal exudate or posterior oropharyngeal erythema.   Eyes:      General: Lids are normal. Vision grossly intact. No scleral icterus.        Right eye: No discharge.         Left eye: No discharge.      Extraocular Movements: Extraocular movements intact.      Conjunctiva/sclera: Conjunctivae normal.      Right eye: Right conjunctiva is not injected.      Left eye: Left conjunctiva is not injected.      Pupils: Pupils are equal, round, and reactive to light.   Cardiovascular:      Rate and Rhythm: Normal rate and regular rhythm.      Heart sounds: Normal heart sounds. No murmur heard.   No gallop.    Pulmonary:      Effort: Pulmonary effort is normal.      Breath sounds: Normal breath sounds and air entry. No " wheezing, rhonchi or rales.   Musculoskeletal:         General: No tenderness or deformity. Normal range of motion.      Cervical back: Full passive range of motion without pain, normal range of motion and neck supple.      Right lower leg: No edema.      Left lower leg: No edema.   Skin:     General: Skin is warm and dry.      Coloration: Skin is not jaundiced.      Findings: No rash.   Neurological:      Mental Status: She is alert and oriented to person, place, and time.      Cranial Nerves: Cranial nerves are intact.      Sensory: Sensation is intact.      Motor: Motor function is intact.      Coordination: Coordination is intact.      Gait: Gait is intact.   Psychiatric:         Attention and Perception: Attention normal.         Mood and Affect: Mood and affect normal.         Behavior: Behavior is not hyperactive. Behavior is cooperative.         Thought Content: Thought content normal.         Judgment: Judgment normal.          Result Review  Data Reviewed:                   Assessment and Plan        Problem List Items Addressed This Visit        Gravid and     Multigravida of advanced maternal age in second trimester       Mental Health    Anxiety and depression - Primary      Patient comes in today to have paperwork completed for her anxiety.  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  Being on camera and public speaking makes the patient very uncomfortable and nervous.  She is very self-conscious.  This unfortunately affects her work outcome.  Work is told her that if she gets a note from us indicating that she does have anxiety that can be worsened by this that they can allow her to do her job by paperwork.  That paperwork was completed today.    I did again discuss with the patient about her taking the Celexa in pregnancy.  She states that she again had addressed this with her OB doctor and he is fine with it.  She failed  to mention it to the maternal-fetal medicine doctor but he is following her closely due to some abnormal genetic testing and is aware that she is on the medication.    Follow-up as needed.        Follow Up   Return if symptoms worsen or fail to improve.  Patient was given instructions and counseling regarding her condition or for health maintenance advice. Please see specific information pulled into the AVS if appropriate.

## 2021-04-06 ENCOUNTER — ROUTINE PRENATAL (OUTPATIENT)
Dept: OBSTETRICS AND GYNECOLOGY | Facility: CLINIC | Age: 36
End: 2021-04-06

## 2021-04-06 VITALS — DIASTOLIC BLOOD PRESSURE: 82 MMHG | SYSTOLIC BLOOD PRESSURE: 122 MMHG | WEIGHT: 206 LBS | BODY MASS INDEX: 35.36 KG/M2

## 2021-04-06 DIAGNOSIS — O28.5 ABNORMAL CHROMOSOMAL AND GENETIC FINDING ON ANTENATAL SCREENING MOTHER: Primary | ICD-10-CM

## 2021-04-06 DIAGNOSIS — O09.522 MULTIGRAVIDA OF ADVANCED MATERNAL AGE IN SECOND TRIMESTER: ICD-10-CM

## 2021-04-06 PROBLEM — Z34.90 SUPERVISION OF NORMAL PREGNANCY: Status: RESOLVED | Noted: 2021-01-07 | Resolved: 2021-04-06

## 2021-04-06 LAB
GLUCOSE UR STRIP-MCNC: ABNORMAL MG/DL
PROT UR STRIP-MCNC: ABNORMAL MG/DL

## 2021-04-06 PROCEDURE — 0502F SUBSEQUENT PRENATAL CARE: CPT | Performed by: OBSTETRICS & GYNECOLOGY

## 2021-04-06 NOTE — PROGRESS NOTES
Feeling some fetal movement  Reviewed normal follow up anatomy US  [facility] recommends growth US in 6 weeks due to low fetal fraction on ffDFNA  Discussed possible dizzy spells and ensuring adequate hydration    Diagnoses and all orders for this visit:    1. Abnormal chromosomal and genetic finding on  screening mother (Primary)    2. Multigravida of advanced maternal age in second trimester

## 2021-04-20 ENCOUNTER — OFFICE VISIT (OUTPATIENT)
Dept: NEUROSURGERY | Facility: CLINIC | Age: 36
End: 2021-04-20

## 2021-04-20 VITALS
BODY MASS INDEX: 35.96 KG/M2 | WEIGHT: 210.6 LBS | HEIGHT: 64 IN | SYSTOLIC BLOOD PRESSURE: 128 MMHG | DIASTOLIC BLOOD PRESSURE: 76 MMHG

## 2021-04-20 DIAGNOSIS — Z78.9 NON-SMOKER: ICD-10-CM

## 2021-04-20 DIAGNOSIS — G93.2 PSEUDOTUMOR CEREBRI: Primary | ICD-10-CM

## 2021-04-20 PROCEDURE — 99203 OFFICE O/P NEW LOW 30 MIN: CPT | Performed by: NEUROLOGICAL SURGERY

## 2021-04-20 NOTE — PATIENT INSTRUCTIONS
"PATIENT TO CONTINUE TO FOLLOW UP WITH HER PRIMARY CARE PROVIDER FOR YEARLY PHYSICAL EXAMS TO ENSURE COMPLETE HEALTH MAINTENANCE        BMI for Adults  What is BMI?  Body mass index (BMI) is a number that is calculated from a person's weight and height. BMI can help estimate how much of a person's weight is composed of fat. BMI does not measure body fat directly. Rather, it is an alternative to procedures that directly measure body fat, which can be difficult and expensive.  BMI can help identify people who may be at higher risk for certain medical problems.  What are BMI measurements used for?  BMI is used as a screening tool to identify possible weight problems. It helps determine whether a person is obese, overweight, a healthy weight, or underweight.  BMI is useful for:  · Identifying a weight problem that may be related to a medical condition or may increase the risk for medical problems.  · Promoting changes, such as changes in diet and exercise, to help reach a healthy weight. BMI screening can be repeated to see if these changes are working.  How is BMI calculated?  BMI involves measuring your weight in relation to your height. Both height and weight are measured, and the BMI is calculated from those numbers. This can be done either in English (U[place] or metric measurements. Note that charts and online BMI calculators are available to help you find your BMI quickly and easily without having to do these calculations yourself.  To calculate your BMI in English ([place] measurements:    1. Measure your weight in pounds (lb).  2. Multiply the number of pounds by 703.  ? For example, for a person who weighs 180 lb, multiply that number by 703, which equals 126,540.  3. Measure your height in inches. Then multiply that number by itself to get a measurement called \"inches squared.\"  ? For example, for a person who is 70 inches tall, the \"inches squared\" measurement is 70 inches x 70 inches, which equals 4,900 inches " "squared.  4. Divide the total from step 2 (number of lb x 703) by the total from step 3 (inches squared): 126,540 ÷ 4,900 = 25.8. This is your BMI.  To calculate your BMI in metric measurements:  1. Measure your weight in kilograms (kg).  2. Measure your height in meters (m). Then multiply that number by itself to get a measurement called \"meters squared.\"  ? For example, for a person who is 1.75 m tall, the \"meters squared\" measurement is 1.75 m x 1.75 m, which is equal to 3.1 meters squared.  3. Divide the number of kilograms (your weight) by the meters squared number. In this example: 70 ÷ 3.1 = 22.6. This is your BMI.  What do the results mean?  BMI charts are used to identify whether you are underweight, normal weight, overweight, or obese. The following guidelines will be used:  · Underweight: BMI less than 18.5.  · Normal weight: BMI between 18.5 and 24.9.  · Overweight: BMI between 25 and 29.9.  · Obese: BMI of 30 or above.  Keep these notes in mind:  · Weight includes both fat and muscle, so someone with a muscular build, such as an athlete, may have a BMI that is higher than 24.9. In cases like these, BMI is not an accurate measure of body fat.  · To determine if excess body fat is the cause of a BMI of 25 or higher, further assessments may need to be done by a health care provider.  · BMI is usually interpreted in the same way for men and women.  Where to find more information  For more information about BMI, including tools to quickly calculate your BMI, go to these websites:  · Centers for Disease Control and Prevention: www.cdc.gov  · American Heart Association: www.heart.org  · National Heart, Lung, and Blood [place]: www.nhlbi.nih.gov  Summary  · Body mass index (BMI) is a number that is calculated from a person's weight and height.  · BMI may help estimate how much of a person's weight is composed of fat. BMI can help identify those who may be at higher risk for certain medical problems.  · BMI " "can be measured using English measurements or metric measurements.  · BMI charts are used to identify whether you are underweight, normal weight, overweight, or obese.  This information is not intended to replace advice given to you by your health care provider. Make sure you discuss any questions you have with your health care provider.  Document Revised: 09/09/2020 Document Reviewed: 07/17/2020  [place] Patient Education © 2021 [employer]      https://www.nhlbi.nih.gov/files/docs/public/heart/dash_brief.pdf\">   DASH Eating Plan  DASH stands for Dietary Approaches to Stop Hypertension. The DASH eating plan is a healthy eating plan that has been shown to:  · Reduce high blood pressure (hypertension).  · Reduce your risk for type 2 diabetes, heart disease, and stroke.  · Help with weight loss.  What are tips for following this plan?  Reading food labels  · Check food labels for the amount of salt (sodium) per serving. Choose foods with less than 5 percent of the Daily Value of sodium. Generally, foods with less than 300 milligrams (mg) of sodium per serving fit into this eating plan.  · To find whole grains, look for the word \"whole\" as the first word in the ingredient list.  Shopping  · Buy products labeled as \"low-sodium\" or \"no salt added.\"  · Buy fresh foods. Avoid canned foods and pre-made or frozen meals.  Cooking  · Avoid adding salt when cooking. Use salt-free seasonings or herbs instead of table salt or sea salt. Check with your health care provider or pharmacist before using salt substitutes.  · Do not li foods. Cook foods using healthy methods such as baking, boiling, grilling, roasting, and broiling instead.  · Cook with heart-healthy oils, such as olive, canola, avocado, soybean, or sunflower oil.  Meal planning    · Eat a balanced diet that includes:  ? 4 or more servings of fruits and 4 or more servings of vegetables each day. Try to fill one-half of your plate with fruits and vegetables.  ? 6-8 " servings of whole grains each day.  ? Less than 6 oz (170 g) of lean meat, poultry, or fish each day. A 3-oz (85-g) serving of meat is about the same size as a deck of cards. One egg equals 1 oz (28 g).  ? 2-3 servings of low-fat dairy each day. One serving is 1 cup (237 mL).  ? 1 serving of nuts, seeds, or beans 5 times each week.  ? 2-3 servings of heart-healthy fats. Healthy fats called omega-3 fatty acids are found in foods such as walnuts, flaxseeds, fortified milks, and eggs. These fats are also found in cold-water fish, such as sardines, salmon, and mackerel.  · Limit how much you eat of:  ? Canned or prepackaged foods.  ? Food that is high in trans fat, such as some fried foods.  ? Food that is high in saturated fat, such as fatty meat.  ? Desserts and other sweets, sugary drinks, and other foods with added sugar.  ? Full-fat dairy products.  · Do not salt foods before eating.  · Do not eat more than 4 egg yolks a week.  · Try to eat at least 2 vegetarian meals a week.  · Eat more home-cooked food and less restaurant, buffet, and fast food.  Lifestyle  · When eating at a restaurant, ask that your food be prepared with less salt or no salt, if possible.  · If you drink alcohol:  ? Limit how much you use to:  § 0-1 drink a day for women who are not pregnant.  § 0-2 drinks a day for men.  ? Be aware of how much alcohol is in your drink. In the [place] one drink equals one 12 oz bottle of beer (355 mL), one 5 oz glass of wine (148 mL), or one 1½ oz glass of hard liquor (44 mL).  General information  · Avoid eating more than 2,300 mg of salt a day. If you have hypertension, you may need to reduce your sodium intake to 1,500 mg a day.  · Work with your health care provider to maintain a healthy body weight or to lose weight. Ask what an ideal weight is for you.  · Get at least 30 minutes of exercise that causes your heart to beat faster (aerobic exercise) most days of the week. Activities may include walking,  swimming, or biking.  · Work with your health care provider or dietitian to adjust your eating plan to your individual calorie needs.  What foods should I eat?  Fruits  All fresh, dried, or frozen fruit. Canned fruit in natural juice (without added sugar).  Vegetables  Fresh or frozen vegetables (raw, steamed, roasted, or grilled). Low-sodium or reduced-sodium tomato and vegetable juice. Low-sodium or reduced-sodium tomato sauce and tomato paste. Low-sodium or reduced-sodium canned vegetables.  Grains  Whole-grain or whole-wheat bread. Whole-grain or whole-wheat pasta. Brown rice. Oatmeal. Quinoa. Bulgur. Whole-grain and low-sodium cereals. Inna bread. Low-fat, low-sodium crackers. Whole-wheat flour tortillas.  Meats and other proteins  Skinless chicken or turkey. Ground chicken or turkey. Pork with fat trimmed off. Fish and seafood. Egg whites. Dried beans, peas, or lentils. Unsalted nuts, nut butters, and seeds. Unsalted canned beans. Lean cuts of beef with fat trimmed off. Low-sodium, lean precooked or cured meat, such as sausages or meat loaves.  Dairy  Low-fat (1%) or fat-free (skim) milk. Reduced-fat, low-fat, or fat-free cheeses. Nonfat, low-sodium ricotta or cottage cheese. Low-fat or nonfat yogurt. Low-fat, low-sodium cheese.  Fats and oils  Soft margarine without trans fats. Vegetable oil. Reduced-fat, low-fat, or light mayonnaise and salad dressings (reduced-sodium). Canola, safflower, olive, avocado, soybean, and sunflower oils. Avocado.  Seasonings and condiments  Herbs. Spices. Seasoning mixes without salt.  Other foods  Unsalted popcorn and pretzels. Fat-free sweets.  The items listed above may not be a complete list of foods and beverages you can eat. Contact a dietitian for more information.  What foods should I avoid?  Fruits  Canned fruit in a light or heavy syrup. Fried fruit. Fruit in cream or butter sauce.  Vegetables  Creamed or fried vegetables. Vegetables in a cheese sauce. Regular canned  vegetables (not low-sodium or reduced-sodium). Regular canned tomato sauce and paste (not low-sodium or reduced-sodium). Regular tomato and vegetable juice (not low-sodium or reduced-sodium). Pickles. Olives.  Grains  Baked goods made with fat, such as croissants, muffins, or some breads. Dry pasta or rice meal packs.  Meats and other proteins  Fatty cuts of meat. Ribs. Fried meat. [place]. Bologna, salami, and other precooked or cured meats, such as sausages or meat loaves. Fat from the back of a pig (fatback). Bratwurst. Salted nuts and seeds. Canned beans with added salt. Canned or smoked fish. Whole eggs or egg yolks. Chicken or turkey with skin.  Dairy  Whole or 2% milk, cream, and half-and-half. Whole or full-fat cream cheese. Whole-fat or sweetened yogurt. Full-fat cheese. Nondairy creamers. Whipped toppings. Processed cheese and cheese spreads.  Fats and oils  Butter. Stick margarine. Lard. Shortening. Ghee. [place] fat. Tropical oils, such as coconut, palm kernel, or palm oil.  Seasonings and condiments  Onion salt, garlic salt, seasoned salt, table salt, and sea salt. Worcestershire sauce. Tartar sauce. Barbecue sauce. Teriyaki sauce. Soy sauce, including reduced-sodium. Steak sauce. Canned and packaged gravies. Fish sauce. Oyster sauce. Cocktail sauce. Store-bought horseradish. Ketchup. Mustard. Meat flavorings and tenderizers. Bouillon cubes. Hot sauces. Pre-made or packaged marinades. Pre-made or packaged taco seasonings. Relishes. Regular salad dressings.  Other foods  Salted popcorn and pretzels.  The items listed above may not be a complete list of foods and beverages you should avoid. Contact a dietitian for more information.  Where to find more information  · National Heart, Lung, and Blood [place]: www.nhlbi.nih.gov  · American Heart Association: www.heart.org  · Academy of Nutrition and Dietetics: www.eatright.org  · National Kidney Foundation: www.kidney.org  Summary  · The DASH eating plan is a  healthy eating plan that has been shown to reduce high blood pressure (hypertension). It may also reduce your risk for type 2 diabetes, heart disease, and stroke.  · When on the DASH eating plan, aim to eat more fresh fruits and vegetables, whole grains, lean proteins, low-fat dairy, and heart-healthy fats.  · With the DASH eating plan, you should limit salt (sodium) intake to 2,300 mg a day. If you have hypertension, you may need to reduce your sodium intake to 1,500 mg a day.  · Work with your health care provider or dietitian to adjust your eating plan to your individual calorie needs.  This information is not intended to replace advice given to you by your health care provider. Make sure you discuss any questions you have with your health care provider.  Document Revised: 11/20/2020 Document Reviewed: 11/20/2020  Elsevier Patient Education © 2021 [employer]
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
## 2021-04-28 ENCOUNTER — OFFICE VISIT (OUTPATIENT)
Dept: FAMILY MEDICINE CLINIC | Facility: CLINIC | Age: 36
End: 2021-04-28

## 2021-04-28 VITALS
BODY MASS INDEX: 35.51 KG/M2 | HEART RATE: 112 BPM | HEIGHT: 64 IN | DIASTOLIC BLOOD PRESSURE: 87 MMHG | WEIGHT: 208 LBS | RESPIRATION RATE: 20 BRPM | TEMPERATURE: 97.1 F | SYSTOLIC BLOOD PRESSURE: 136 MMHG

## 2021-04-28 DIAGNOSIS — H65.93 FLUID LEVEL BEHIND TYMPANIC MEMBRANE, BILATERAL: ICD-10-CM

## 2021-04-28 DIAGNOSIS — F32.A ANXIETY AND DEPRESSION: ICD-10-CM

## 2021-04-28 DIAGNOSIS — J02.8 ACUTE PHARYNGITIS DUE TO OTHER SPECIFIED ORGANISMS: Primary | ICD-10-CM

## 2021-04-28 DIAGNOSIS — Z3A.25 25 WEEKS GESTATION OF PREGNANCY: ICD-10-CM

## 2021-04-28 DIAGNOSIS — F41.9 ANXIETY AND DEPRESSION: ICD-10-CM

## 2021-04-28 DIAGNOSIS — O99.210 OBESITY AFFECTING PREGNANCY, ANTEPARTUM: ICD-10-CM

## 2021-04-28 PROCEDURE — 99214 OFFICE O/P EST MOD 30 MIN: CPT | Performed by: NURSE PRACTITIONER

## 2021-04-28 RX ORDER — CITALOPRAM 20 MG/1
20 TABLET ORAL DAILY
Qty: 30 TABLET | Refills: 2 | Status: SHIPPED | OUTPATIENT
Start: 2021-04-28 | End: 2021-08-13

## 2021-04-28 RX ORDER — AZITHROMYCIN 250 MG/1
TABLET, FILM COATED ORAL
Qty: 6 TABLET | Refills: 0 | Status: SHIPPED | OUTPATIENT
Start: 2021-04-28 | End: 2021-05-06

## 2021-04-28 NOTE — PROGRESS NOTES
"Chief Complaint  Sore Throat    Subjective    History of Present Illness      Patient presents to [facility] for   Pt c/o a sore throat x 2 days ago. Pt states that she has blisters in the back of her throat.     Sore Throat   This is a new problem. The current episode started in the past 7 days. The problem has been gradually worsening. There has been no fever. Associated symptoms include swollen glands and trouble swallowing.        Review of Systems   Constitutional: Negative.    HENT: Positive for sore throat, swollen glands and trouble swallowing.    Eyes: Negative.    Respiratory: Negative.    Cardiovascular: Negative.    Gastrointestinal: Negative.    Endocrine: Negative.    Genitourinary: Negative.    Musculoskeletal: Negative.    Skin: Negative.    Allergic/Immunologic: Negative.    Neurological: Negative.    Hematological: Negative.    Psychiatric/Behavioral: Negative.        I have reviewed and agree with the HPI and ROS information as above.  Arianna Santana, APRN     Objective   Vital Signs:   /87   Pulse 112   Temp 97.1 °F (36.2 °C)   Resp 20   Ht 162.6 cm (64\")   Wt 94.3 kg (208 lb)   BMI 35.70 kg/m²       Physical Exam  Constitutional:       Appearance: Normal appearance. She is well-developed.   HENT:      Head: Normocephalic and atraumatic.      Right Ear: Tympanic membrane, ear canal and external ear normal.      Left Ear: Tympanic membrane, ear canal and external ear normal.      Ears:      Comments: Fluid noted to bilateral TM.      Nose: Nose normal. No septal deviation, nasal tenderness or congestion.      Mouth/Throat:      Lips: Pink. No lesions.      Mouth: Mucous membranes are moist. No oral lesions.      Dentition: Normal dentition.      Pharynx: Oropharynx is clear. No pharyngeal swelling, oropharyngeal exudate or posterior oropharyngeal erythema.      Comments: 3 small ulcers to soft palate and to right inner cheek.  Eyes:      General: Lids " are normal. Vision grossly intact. No scleral icterus.        Right eye: No discharge.         Left eye: No discharge.      Extraocular Movements: Extraocular movements intact.      Conjunctiva/sclera: Conjunctivae normal.      Right eye: Right conjunctiva is not injected.      Left eye: Left conjunctiva is not injected.      Pupils: Pupils are equal, round, and reactive to light.   Neck:      Thyroid: No thyroid mass.      Trachea: Trachea normal.   Cardiovascular:      Rate and Rhythm: Normal rate and regular rhythm.      Heart sounds: Normal heart sounds. No murmur heard.   No gallop.    Pulmonary:      Effort: Pulmonary effort is normal.      Breath sounds: Normal breath sounds and air entry. No wheezing, rhonchi or rales.   Abdominal:      General: There is no distension.      Palpations: Abdomen is soft. There is no mass.      Tenderness: There is no abdominal tenderness. There is no right CVA tenderness, left CVA tenderness, guarding or rebound.   Musculoskeletal:         General: No tenderness or deformity. Normal range of motion.      Cervical back: Full passive range of motion without pain, normal range of motion and neck supple.      Thoracic back: Normal.      Right lower leg: No edema.      Left lower leg: No edema.   Skin:     General: Skin is warm and dry.      Coloration: Skin is not jaundiced.      Findings: No rash.   Neurological:      Mental Status: She is alert and oriented to person, place, and time.      Cranial Nerves: Cranial nerves are intact.      Sensory: Sensation is intact.      Motor: Motor function is intact.      Coordination: Coordination is intact.      Gait: Gait is intact.      Deep Tendon Reflexes: Reflexes are normal and symmetric.   Psychiatric:         Mood and Affect: Mood and affect normal.         Judgment: Judgment normal.          Result Review  Data Reviewed:              Assessment and Plan    Patient's Body mass index is 35.7 kg/m².     Problem List Items Addressed  This Visit        Gravid and     Obesity affecting pregnancy, antepartum       Mental Health    Anxiety and depression    Relevant Medications    citalopram (CeleXA) 20 MG tablet      Other Visit Diagnoses     Acute pharyngitis due to other specified organisms    -  Primary    Relevant Medications    azithromycin (Zithromax Z-Mati) 250 MG tablet    25 weeks gestation of pregnancy        Fluid level behind tympanic membrane, bilateral            Pt presents to the clinic today with c/o a sore throat x 2 days.  Patient states she also has pain with swallowing, sinus congestion, and right ear pain.  She states she has noticed some blisters on the back of her throat as well. On exam there were 3 small ulcers noted to soft palate and 1 to her left inner cheek. Fluid noted to bilateral TM on exam as well. Differential of viral illness. Will cover with an antibiotic as well. Will start a Zpak at this time. Also recommended to start daily flonase for sinus congestion and fluid in ears.     Pt also requesting refills of celexa States she is doing well with . Pt is 25 weeks pregnant and has been taking this during her pregnancy. This was addressed at her last visit at our office. Her Obgyn is aware she is taking this and is monitored closely. Refills sent. F/u 3 months.     Follow Up   Return in about 3 months (around 2021) for Recheck.  Patient was given instructions and counseling regarding her condition or for health maintenance advice. Please see specific information pulled into the AVS if appropriate.

## 2021-05-06 ENCOUNTER — ROUTINE PRENATAL (OUTPATIENT)
Dept: OBSTETRICS AND GYNECOLOGY | Facility: CLINIC | Age: 36
End: 2021-05-06

## 2021-05-06 VITALS — WEIGHT: 210 LBS | DIASTOLIC BLOOD PRESSURE: 80 MMHG | SYSTOLIC BLOOD PRESSURE: 122 MMHG | BODY MASS INDEX: 36.05 KG/M2

## 2021-05-06 DIAGNOSIS — O28.5 ABNORMAL CHROMOSOMAL AND GENETIC FINDING ON ANTENATAL SCREENING MOTHER: ICD-10-CM

## 2021-05-06 DIAGNOSIS — O09.529 AMA (ADVANCED MATERNAL AGE) MULTIGRAVIDA 35+, UNSPECIFIED TRIMESTER: ICD-10-CM

## 2021-05-06 DIAGNOSIS — O09.522 MULTIGRAVIDA OF ADVANCED MATERNAL AGE IN SECOND TRIMESTER: Primary | ICD-10-CM

## 2021-05-06 LAB
GLUCOSE UR STRIP-MCNC: ABNORMAL MG/DL
PROT UR STRIP-MCNC: ABNORMAL MG/DL

## 2021-05-06 PROCEDURE — 0502F SUBSEQUENT PRENATAL CARE: CPT | Performed by: OBSTETRICS & GYNECOLOGY

## 2021-05-06 RX ORDER — METOCLOPRAMIDE 10 MG/1
10 TABLET ORAL 3 TIMES DAILY PRN
Qty: 30 TABLET | Refills: 1 | Status: SHIPPED | OUTPATIENT
Start: 2021-05-06 | End: 2021-07-31 | Stop reason: HOSPADM

## 2021-05-06 NOTE — PROGRESS NOTES
Good fetal movement  Growth US with MFM next week  Reviewed anatomy US and follow up US with MFM  Glucola and Hgb ordered for next visit  Discussed possible dizzy spells and ensuring adequate hydration    Diagnoses and all orders for this visit:    1. Multigravida of advanced maternal age in second trimester (Primary)    2. Abnormal chromosomal and genetic finding on  screening mother    3. AMA (advanced maternal age) multigravida 35+, unspecified trimester

## 2021-05-12 ENCOUNTER — TELEPHONE (OUTPATIENT)
Dept: FAMILY MEDICINE CLINIC | Facility: CLINIC | Age: 36
End: 2021-05-12

## 2021-05-12 DIAGNOSIS — F32.A ANXIETY AND DEPRESSION: ICD-10-CM

## 2021-05-12 DIAGNOSIS — F41.9 ANXIETY AND DEPRESSION: ICD-10-CM

## 2021-05-12 RX ORDER — CITALOPRAM 20 MG/1
20 TABLET ORAL DAILY
Qty: 30 TABLET | Refills: 2 | OUTPATIENT
Start: 2021-05-12

## 2021-05-12 NOTE — TELEPHONE ENCOUNTER
PATIENT IS REQUESTING A CALL BACK FROM CLINICAL REGARDING DENIED SCRIPT.    citalopram (CeleXA) 20 MG tablet    [facility]: [phone]

## 2021-05-24 ENCOUNTER — ROUTINE PRENATAL (OUTPATIENT)
Dept: OBSTETRICS AND GYNECOLOGY | Facility: CLINIC | Age: 36
End: 2021-05-24

## 2021-05-24 VITALS — BODY MASS INDEX: 36.22 KG/M2 | WEIGHT: 211 LBS | SYSTOLIC BLOOD PRESSURE: 102 MMHG | DIASTOLIC BLOOD PRESSURE: 84 MMHG

## 2021-05-24 DIAGNOSIS — Z34.93 PRENATAL CARE IN THIRD TRIMESTER: Primary | ICD-10-CM

## 2021-05-24 LAB
GLUCOSE UR STRIP-MCNC: ABNORMAL MG/DL
PROT UR STRIP-MCNC: ABNORMAL MG/DL

## 2021-05-24 PROCEDURE — 0502F SUBSEQUENT PRENATAL CARE: CPT | Performed by: NURSE PRACTITIONER

## 2021-05-24 RX ORDER — OMEPRAZOLE 20 MG/1
20 CAPSULE, DELAYED RELEASE ORAL DAILY
COMMUNITY
End: 2021-08-12

## 2021-05-24 NOTE — PROGRESS NOTES
Good Fetal movement  Blood sugar today  Labor warnings are given.  States that her reflux is much improved.

## 2021-05-25 LAB
GLUCOSE 1H P 50 G GLC PO SERPL-MCNC: 423 MG/DL (ref 65–139)
HGB BLD-MCNC: 11.5 G/DL (ref 12–15.9)

## 2021-05-25 NOTE — TELEPHONE ENCOUNTER
----- Message from Jean-Claude Valentino MD sent at 5/25/2021 12:50 PM CDT -----  Her glucose test is markedly abnormal and she needs to start checking blood sugars immediately.  Please send a prescription for glucometer and test strips.  Instruct on how to test blood sugar fasting and 2 hours after meals.  She needs to be seen within 1 week of starting to check blood sugars.  Please arrange diabetic education if possible.

## 2021-05-25 NOTE — TELEPHONE ENCOUNTER
Pt informed of markedly elevated 1h GTT result and the need to start monitoring glucose at home. Pt informed to keep glucose log and return to clinic in one week, I will have the scheduling dept call to arrange 1 week follow up. Patient info also given to Marimar Cristina to arrange diabetic education. Pt understands she will test fasting and then 2 h after each of her three large meals. Glucometer and supplies pended to Dr Valentino.

## 2021-05-26 RX ORDER — BLOOD-GLUCOSE METER
1 KIT MISCELLANEOUS 4 TIMES DAILY
Qty: 1 EACH | Refills: 0 | Status: SHIPPED | OUTPATIENT
Start: 2021-05-26 | End: 2021-08-12

## 2021-05-26 RX ORDER — LANCETS 30 GAUGE
1 EACH MISCELLANEOUS 4 TIMES DAILY
Qty: 200 EACH | Refills: 1 | Status: SHIPPED | OUTPATIENT
Start: 2021-05-26 | End: 2021-08-12

## 2021-05-30 ENCOUNTER — HOSPITAL ENCOUNTER (OUTPATIENT)
Facility: HOSPITAL | Age: 36
Discharge: HOME | End: 2021-06-01
Attending: OBSTETRICS & GYNECOLOGY | Admitting: OBSTETRICS & GYNECOLOGY

## 2021-05-30 LAB
ACETONE BLD QL: NEGATIVE
ALBUMIN SERPL-MCNC: 3.1 G/DL (ref 3.5–5.2)
ALBUMIN/GLOB SERPL: 0.8 G/DL
ALP SERPL-CCNC: 110 U/L (ref 39–117)
ALT SERPL W P-5'-P-CCNC: 9 U/L (ref 1–33)
ANION GAP SERPL CALCULATED.3IONS-SCNC: 14 MMOL/L (ref 5–15)
AST SERPL-CCNC: 14 U/L (ref 1–32)
BACTERIA UR QL AUTO: ABNORMAL /HPF
BILIRUB SERPL-MCNC: 0.3 MG/DL (ref 0–1.2)
BILIRUB UR QL STRIP: NEGATIVE
BUN SERPL-MCNC: 8 MG/DL (ref 6–20)
BUN/CREAT SERPL: 12.9 (ref 7–25)
CALCIUM SPEC-SCNC: 9.2 MG/DL (ref 8.6–10.5)
CHLORIDE SERPL-SCNC: 102 MMOL/L (ref 98–107)
CLARITY UR: CLEAR
CO2 SERPL-SCNC: 18 MMOL/L (ref 22–29)
COLOR UR: YELLOW
CREAT SERPL-MCNC: 0.62 MG/DL (ref 0.57–1)
DEPRECATED RDW RBC AUTO: 49.2 FL (ref 37–54)
EOSINOPHIL # BLD MANUAL: 0.09 10*3/MM3 (ref 0–0.4)
EOSINOPHIL NFR BLD MANUAL: 1 % (ref 0.3–6.2)
ERYTHROCYTE [DISTWIDTH] IN BLOOD BY AUTOMATED COUNT: 16 % (ref 12.3–15.4)
GFR SERPL CREATININE-BSD FRML MDRD: 109 ML/MIN/1.73
GLOBULIN UR ELPH-MCNC: 3.7 GM/DL
GLUCOSE BLDC GLUCOMTR-MCNC: 192 MG/DL (ref 70–130)
GLUCOSE BLDC GLUCOMTR-MCNC: 260 MG/DL (ref 70–130)
GLUCOSE BLDC GLUCOMTR-MCNC: 286 MG/DL (ref 70–130)
GLUCOSE BLDC GLUCOMTR-MCNC: 409 MG/DL (ref 70–130)
GLUCOSE SERPL-MCNC: 385 MG/DL (ref 65–99)
GLUCOSE UR STRIP-MCNC: ABNORMAL MG/DL
HBA1C MFR BLD: 9 % (ref 4.8–5.6)
HCT VFR BLD AUTO: 31.6 % (ref 34–46.6)
HGB BLD-MCNC: 11.1 G/DL (ref 12–15.9)
HGB UR QL STRIP.AUTO: NEGATIVE
HYALINE CASTS UR QL AUTO: ABNORMAL /LPF
KETONES UR QL STRIP: ABNORMAL
LEUKOCYTE ESTERASE UR QL STRIP.AUTO: NEGATIVE
LYMPHOCYTES # BLD MANUAL: 0.95 10*3/MM3 (ref 0.7–3.1)
LYMPHOCYTES NFR BLD MANUAL: 10.1 % (ref 19.6–45.3)
LYMPHOCYTES NFR BLD MANUAL: 4 % (ref 5–12)
MCH RBC QN AUTO: 30.2 PG (ref 26.6–33)
MCHC RBC AUTO-ENTMCNC: 35.1 G/DL (ref 31.5–35.7)
MCV RBC AUTO: 86.1 FL (ref 79–97)
METAMYELOCYTES NFR BLD MANUAL: 1 % (ref 0–0)
MONOCYTES # BLD AUTO: 0.38 10*3/MM3 (ref 0.1–0.9)
NEUTROPHILS # BLD AUTO: 7.91 10*3/MM3 (ref 1.7–7)
NEUTROPHILS NFR BLD MANUAL: 81.8 % (ref 42.7–76)
NEUTS BAND NFR BLD MANUAL: 2 % (ref 0–5)
NITRITE UR QL STRIP: NEGATIVE
NRBC SPEC MANUAL: 1 /100 WBC (ref 0–0.2)
PH UR STRIP.AUTO: 6.5 [PH] (ref 5–8)
PLAT MORPH BLD: NORMAL
PLATELET # BLD AUTO: 177 10*3/MM3 (ref 140–450)
PMV BLD AUTO: 11.6 FL (ref 6–12)
POLYCHROMASIA BLD QL SMEAR: ABNORMAL
POTASSIUM SERPL-SCNC: 3.7 MMOL/L (ref 3.5–5.2)
PROT SERPL-MCNC: 6.8 G/DL (ref 6–8.5)
PROT UR QL STRIP: ABNORMAL
RBC # BLD AUTO: 3.67 10*6/MM3 (ref 3.77–5.28)
RBC # UR: ABNORMAL /HPF
REF LAB TEST METHOD: ABNORMAL
ROULEAUX BLD QL SMEAR: ABNORMAL
SODIUM SERPL-SCNC: 134 MMOL/L (ref 136–145)
SP GR UR STRIP: >1.03 (ref 1–1.03)
SQUAMOUS #/AREA URNS HPF: ABNORMAL /HPF
UROBILINOGEN UR QL STRIP: ABNORMAL
WBC # BLD AUTO: 9.43 10*3/MM3 (ref 3.4–10.8)
WBC MORPH BLD: NORMAL
WBC UR QL AUTO: ABNORMAL /HPF

## 2021-05-30 PROCEDURE — 83525 ASSAY OF INSULIN: CPT | Performed by: OBSTETRICS & GYNECOLOGY

## 2021-05-30 PROCEDURE — 83036 HEMOGLOBIN GLYCOSYLATED A1C: CPT | Performed by: OBSTETRICS & GYNECOLOGY

## 2021-05-30 PROCEDURE — 87086 URINE CULTURE/COLONY COUNT: CPT | Performed by: OBSTETRICS & GYNECOLOGY

## 2021-05-30 PROCEDURE — G0378 HOSPITAL OBSERVATION PER HR: HCPCS

## 2021-05-30 PROCEDURE — 81001 URINALYSIS AUTO W/SCOPE: CPT | Performed by: OBSTETRICS & GYNECOLOGY

## 2021-05-30 PROCEDURE — 85007 BL SMEAR W/DIFF WBC COUNT: CPT | Performed by: OBSTETRICS & GYNECOLOGY

## 2021-05-30 PROCEDURE — 85027 COMPLETE CBC AUTOMATED: CPT | Performed by: OBSTETRICS & GYNECOLOGY

## 2021-05-30 PROCEDURE — 82962 GLUCOSE BLOOD TEST: CPT

## 2021-05-30 PROCEDURE — 80053 COMPREHEN METABOLIC PANEL: CPT | Performed by: OBSTETRICS & GYNECOLOGY

## 2021-05-30 PROCEDURE — 36415 COLL VENOUS BLD VENIPUNCTURE: CPT | Performed by: OBSTETRICS & GYNECOLOGY

## 2021-05-30 PROCEDURE — 96360 HYDRATION IV INFUSION INIT: CPT

## 2021-05-30 PROCEDURE — G0463 HOSPITAL OUTPT CLINIC VISIT: HCPCS

## 2021-05-30 PROCEDURE — 83527 ASSAY OF INSULIN: CPT | Performed by: OBSTETRICS & GYNECOLOGY

## 2021-05-30 PROCEDURE — 63710000001 INSULIN REGULAR HUMAN PER 5 UNITS: Performed by: OBSTETRICS & GYNECOLOGY

## 2021-05-30 PROCEDURE — 59025 FETAL NON-STRESS TEST: CPT

## 2021-05-30 PROCEDURE — 82009 KETONE BODYS QUAL: CPT | Performed by: OBSTETRICS & GYNECOLOGY

## 2021-05-30 RX ORDER — DIPHENHYDRAMINE HCL 50 MG
50 CAPSULE ORAL NIGHTLY PRN
Status: DISCONTINUED | OUTPATIENT
Start: 2021-05-30 | End: 2021-06-01 | Stop reason: HOSPADM

## 2021-05-30 RX ORDER — PANTOPRAZOLE SODIUM 40 MG/1
40 TABLET, DELAYED RELEASE ORAL
Status: DISCONTINUED | OUTPATIENT
Start: 2021-05-31 | End: 2021-06-01 | Stop reason: HOSPADM

## 2021-05-30 RX ORDER — METOCLOPRAMIDE 10 MG/1
10 TABLET ORAL 3 TIMES DAILY PRN
Status: DISCONTINUED | OUTPATIENT
Start: 2021-05-30 | End: 2021-06-01 | Stop reason: HOSPADM

## 2021-05-30 RX ORDER — PRENATAL VIT/IRON FUM/FOLIC AC 27MG-0.8MG
1 TABLET ORAL NIGHTLY
Status: DISCONTINUED | OUTPATIENT
Start: 2021-05-30 | End: 2021-06-01 | Stop reason: HOSPADM

## 2021-05-30 RX ORDER — ACETAMINOPHEN 500 MG
500 TABLET ORAL ONCE
Status: COMPLETED | OUTPATIENT
Start: 2021-05-30 | End: 2021-05-30

## 2021-05-30 RX ORDER — CITALOPRAM 20 MG/1
20 TABLET ORAL NIGHTLY
Status: DISCONTINUED | OUTPATIENT
Start: 2021-05-30 | End: 2021-06-01 | Stop reason: HOSPADM

## 2021-05-30 RX ORDER — POTASSIUM CHLORIDE 750 MG/1
10 CAPSULE, EXTENDED RELEASE ORAL
Status: COMPLETED | OUTPATIENT
Start: 2021-05-30 | End: 2021-06-01

## 2021-05-30 RX ADMIN — ACETAMINOPHEN 500 MG: 500 TABLET, FILM COATED ORAL at 19:58

## 2021-05-30 RX ADMIN — INSULIN HUMAN 10 UNITS: 100 INJECTION, SOLUTION PARENTERAL at 16:56

## 2021-05-30 RX ADMIN — POTASSIUM CHLORIDE 10 MEQ: 750 CAPSULE, EXTENDED RELEASE ORAL at 15:12

## 2021-05-30 RX ADMIN — PRENATAL VIT W/ FE FUMARATE-FA TAB 27-0.8 MG 1 TABLET: 27-0.8 TAB at 20:00

## 2021-05-30 RX ADMIN — METFORMIN HYDROCHLORIDE 500 MG: 500 TABLET ORAL at 14:18

## 2021-05-30 RX ADMIN — POTASSIUM CHLORIDE 10 MEQ: 750 CAPSULE, EXTENDED RELEASE ORAL at 18:59

## 2021-05-30 RX ADMIN — INSULIN HUMAN 10 UNITS: 100 INJECTION, SOLUTION PARENTERAL at 14:35

## 2021-05-30 RX ADMIN — SODIUM CHLORIDE 2000 ML: 9 INJECTION, SOLUTION INTRAVENOUS at 13:32

## 2021-05-30 RX ADMIN — CITALOPRAM 20 MG: 20 TABLET, FILM COATED ORAL at 20:11

## 2021-05-31 LAB
ALBUMIN SERPL-MCNC: 2.8 G/DL (ref 3.5–5.2)
ALBUMIN/GLOB SERPL: 0.9 G/DL
ALP SERPL-CCNC: 93 U/L (ref 39–117)
ALT SERPL W P-5'-P-CCNC: 8 U/L (ref 1–33)
ANION GAP SERPL CALCULATED.3IONS-SCNC: 11 MMOL/L (ref 5–15)
AST SERPL-CCNC: 14 U/L (ref 1–32)
BACTERIA SPEC AEROBE CULT: NORMAL
BILIRUB SERPL-MCNC: 0.3 MG/DL (ref 0–1.2)
BUN SERPL-MCNC: 7 MG/DL (ref 6–20)
BUN/CREAT SERPL: 15.6 (ref 7–25)
CALCIUM SPEC-SCNC: 8.5 MG/DL (ref 8.6–10.5)
CHLORIDE SERPL-SCNC: 104 MMOL/L (ref 98–107)
CO2 SERPL-SCNC: 19 MMOL/L (ref 22–29)
CREAT SERPL-MCNC: 0.45 MG/DL (ref 0.57–1)
GFR SERPL CREATININE-BSD FRML MDRD: >150 ML/MIN/1.73
GLOBULIN UR ELPH-MCNC: 3 GM/DL
GLUCOSE BLDC GLUCOMTR-MCNC: 147 MG/DL (ref 70–130)
GLUCOSE BLDC GLUCOMTR-MCNC: 159 MG/DL (ref 70–130)
GLUCOSE BLDC GLUCOMTR-MCNC: 213 MG/DL (ref 70–130)
GLUCOSE SERPL-MCNC: 204 MG/DL (ref 65–99)
POTASSIUM SERPL-SCNC: 3.6 MMOL/L (ref 3.5–5.2)
PROT SERPL-MCNC: 5.8 G/DL (ref 6–8.5)
SODIUM SERPL-SCNC: 134 MMOL/L (ref 136–145)

## 2021-05-31 PROCEDURE — 99224 PR SBSQ OBSERVATION CARE/DAY 15 MINUTES: CPT | Performed by: OBSTETRICS & GYNECOLOGY

## 2021-05-31 PROCEDURE — 59025 FETAL NON-STRESS TEST: CPT

## 2021-05-31 PROCEDURE — G0378 HOSPITAL OBSERVATION PER HR: HCPCS

## 2021-05-31 PROCEDURE — 82962 GLUCOSE BLOOD TEST: CPT

## 2021-05-31 PROCEDURE — 63710000001 INSULIN DETEMIR PER 5 UNITS: Performed by: OBSTETRICS & GYNECOLOGY

## 2021-05-31 PROCEDURE — 59025 FETAL NON-STRESS TEST: CPT | Performed by: OBSTETRICS & GYNECOLOGY

## 2021-05-31 PROCEDURE — 80053 COMPREHEN METABOLIC PANEL: CPT | Performed by: OBSTETRICS & GYNECOLOGY

## 2021-05-31 PROCEDURE — 63710000001 INSULIN REGULAR HUMAN PER 5 UNITS: Performed by: OBSTETRICS & GYNECOLOGY

## 2021-05-31 RX ORDER — ACETAMINOPHEN 325 MG/1
650 TABLET ORAL EVERY 6 HOURS PRN
Status: DISCONTINUED | OUTPATIENT
Start: 2021-05-31 | End: 2021-06-01 | Stop reason: HOSPADM

## 2021-05-31 RX ORDER — ACETAMINOPHEN 500 MG
500 TABLET ORAL ONCE
Status: COMPLETED | OUTPATIENT
Start: 2021-05-31 | End: 2021-05-31

## 2021-05-31 RX ADMIN — CITALOPRAM 20 MG: 20 TABLET, FILM COATED ORAL at 20:35

## 2021-05-31 RX ADMIN — POTASSIUM CHLORIDE 10 MEQ: 750 CAPSULE, EXTENDED RELEASE ORAL at 17:48

## 2021-05-31 RX ADMIN — METFORMIN HYDROCHLORIDE 500 MG: 500 TABLET ORAL at 08:31

## 2021-05-31 RX ADMIN — PRENATAL VIT W/ FE FUMARATE-FA TAB 27-0.8 MG 1 TABLET: 27-0.8 TAB at 20:35

## 2021-05-31 RX ADMIN — ACETAMINOPHEN 650 MG: 325 TABLET, FILM COATED ORAL at 10:46

## 2021-05-31 RX ADMIN — INSULIN DETEMIR 20 UNITS: 100 INJECTION, SOLUTION SUBCUTANEOUS at 10:46

## 2021-05-31 RX ADMIN — METOCLOPRAMIDE 10 MG: 10 TABLET ORAL at 20:35

## 2021-05-31 RX ADMIN — ACETAMINOPHEN 500 MG: 500 TABLET, FILM COATED ORAL at 02:45

## 2021-05-31 RX ADMIN — METOCLOPRAMIDE 10 MG: 10 TABLET ORAL at 09:25

## 2021-05-31 RX ADMIN — PANTOPRAZOLE SODIUM 40 MG: 40 TABLET, DELAYED RELEASE ORAL at 06:59

## 2021-05-31 RX ADMIN — POTASSIUM CHLORIDE 10 MEQ: 750 CAPSULE, EXTENDED RELEASE ORAL at 08:31

## 2021-05-31 RX ADMIN — INSULIN HUMAN 10 UNITS: 100 INJECTION, SOLUTION PARENTERAL at 08:37

## 2021-05-31 RX ADMIN — METFORMIN HYDROCHLORIDE 500 MG: 500 TABLET ORAL at 17:48

## 2021-05-31 RX ADMIN — POTASSIUM CHLORIDE 10 MEQ: 750 CAPSULE, EXTENDED RELEASE ORAL at 12:30

## 2021-06-01 VITALS
DIASTOLIC BLOOD PRESSURE: 77 MMHG | TEMPERATURE: 97.8 F | RESPIRATION RATE: 18 BRPM | HEART RATE: 115 BPM | OXYGEN SATURATION: 99 % | HEIGHT: 64 IN | SYSTOLIC BLOOD PRESSURE: 132 MMHG | WEIGHT: 209 LBS | BODY MASS INDEX: 35.68 KG/M2

## 2021-06-01 LAB
GLUCOSE BLDC GLUCOMTR-MCNC: 192 MG/DL (ref 70–130)
GLUCOSE BLDC GLUCOMTR-MCNC: 203 MG/DL (ref 70–130)

## 2021-06-01 PROCEDURE — G0378 HOSPITAL OBSERVATION PER HR: HCPCS

## 2021-06-01 PROCEDURE — 99224 PR SBSQ OBSERVATION CARE/DAY 15 MINUTES: CPT | Performed by: OBSTETRICS & GYNECOLOGY

## 2021-06-01 PROCEDURE — 59025 FETAL NON-STRESS TEST: CPT | Performed by: OBSTETRICS & GYNECOLOGY

## 2021-06-01 PROCEDURE — 63710000001 INSULIN DETEMIR PER 5 UNITS: Performed by: OBSTETRICS & GYNECOLOGY

## 2021-06-01 PROCEDURE — 59025 FETAL NON-STRESS TEST: CPT

## 2021-06-01 PROCEDURE — 63710000001 INSULIN DETEMIR PER 5 UNITS: Performed by: NURSE PRACTITIONER

## 2021-06-01 PROCEDURE — 82962 GLUCOSE BLOOD TEST: CPT

## 2021-06-01 RX ADMIN — PANTOPRAZOLE SODIUM 40 MG: 40 TABLET, DELAYED RELEASE ORAL at 06:01

## 2021-06-01 RX ADMIN — INSULIN DETEMIR 20 UNITS: 100 INJECTION, SOLUTION SUBCUTANEOUS at 10:00

## 2021-06-01 RX ADMIN — METFORMIN HYDROCHLORIDE 500 MG: 500 TABLET ORAL at 08:03

## 2021-06-01 RX ADMIN — INSULIN DETEMIR 10 UNITS: 100 INJECTION, SOLUTION SUBCUTANEOUS at 13:06

## 2021-06-01 RX ADMIN — POTASSIUM CHLORIDE 10 MEQ: 750 CAPSULE, EXTENDED RELEASE ORAL at 08:03

## 2021-06-01 NOTE — CONSULTS
Pt awake and alert.  Admitted with new diagnosis of Diabetes during this pregnancy,  A1C is 9.0%.  Pt's  at bedside.  Very supportive.  Viewed TIGR film on Insulin administration.  Working with nurses on injections.  Pt reluctant at this point.  Will also follow up with [facility].  Has Dietitian consult also this admit.  Discussed hypoglycemia and treatment.  Pt and  asked appropriate questions.

## 2021-06-01 NOTE — NON STRESS TEST
Sandy Page, a  at 28w6d with an DUY of 2021, by Ultrasound, was seen at [facility] LABOR DELIVERY for a nonstress test.    Chief Complaint   Patient presents with   • Hyperglycemia     reported 600 per pt       Patient Active Problem List   Diagnosis   • Intractable chronic migraine without aura and without status migrainosus   • Pseudotumor cerebri   • S/P ventriculoperitoneal shunt   • Non-smoker   • Multigravida of advanced maternal age in second trimester   • IBS (irritable bowel syndrome)   • Renal calculus, right   • Benign intracranial hypertension   • Anxiety and depression   • Abnormal chromosomal and genetic finding on  screening mother   • AMA (advanced maternal age) multigravida 35+, unspecified trimester   • BMI 36.0-36.9,adult   • Obesity affecting pregnancy, antepartum       Start Time:   Stop Time:       Interpretation A  Nonstress Test Interpretation A: Reactive (21 : Kenzie Mccann, RN)  Comments A: Per FATMATA Mccann RN and ELAINE Lao RN (21 : Kenzie Mccann, RN)

## 2021-06-01 NOTE — NON STRESS TEST
Sandy Page, a  at 29w0d with an DUY of 2021, by Ultrasound, was seen at [facility] LABOR DELIVERY for a nonstress test.    Chief Complaint   Patient presents with   • Hyperglycemia     reported 600 per pt       Patient Active Problem List   Diagnosis   • Intractable chronic migraine without aura and without status migrainosus   • Pseudotumor cerebri   • S/P ventriculoperitoneal shunt   • Non-smoker   • Multigravida of advanced maternal age in second trimester   • IBS (irritable bowel syndrome)   • Renal calculus, right   • Benign intracranial hypertension   • Anxiety and depression   • Abnormal chromosomal and genetic finding on  screening mother   • AMA (advanced maternal age) multigravida 35+, unspecified trimester   • BMI 36.0-36.9,adult   • Obesity affecting pregnancy, antepartum       Start Time: 809  Stop Time: 829    Interpretation A  Nonstress Test Interpretation A: Reactive (21 : Gopi Shahid, RN)  Comments A: verified by two RN Gopi Shahid RN and Seema Millard RN (21 : Gopi Shahid, RN)

## 2021-06-01 NOTE — PLAN OF CARE
Goal Outcome Evaluation:  Plan of Care Reviewed With: patient  Progress: improving  Outcome Summary: Initial RDN eval. Reiterated CHO recommendations as provided by Maternal Fetal Medicine Norma Moore which was at bedside for Pt. (45-50 gm/meal, 15gm/snack) for meals/snacks. Discussed portions of carbohydrate foods. Encouraged avoidance processed foods, refined sugars, fried foods, etc. Encouraged whole fresh foods and appropriate portions of appropriate carb containing foods. Encouraged consistent meal times daily. Pt reports no further questions at this time and reports she will be following up with MFM to follow her diabetes.

## 2021-06-01 NOTE — CONSULTS
MFM consult      Date of Consult:  21    Consulting Physician:  Dr. Valentino    Reason for Consult:  GDM vs T2DM    Active issues:    1.  IUP:  Pt is currently 29w0d, EDC 21.  She reports good fetal movement.  Denies contractions, leaking of fluid or vaginal bleeding.    2.  TYPE 2 DIABETES:  Sandy is a .  She is well-known to our practice.  Pt was recently diagnosed with diabetes, 1 hr .   [id number]-4.  This is likely Type 2 diabetes based on these numbers.   Blood sugar at home >600 and she came to the ER.  She has met with a dietician since being in the hospital.   She was started on Levamir 20u and Metformin 500 bid.  Fasting blood sugar ~200 and 2hr PP ~ 190.  Discussed the importance dietary choices and portions.  Information given on meal planning and carb counting.  Recommend </=45-50 carbs per meal and </=  15 carbs for snacks.  Discussed target ranges and the importance of consistency.  Pt asked to check her blood sugars fasting and 2 hours after meals. Will increase Levamir to 30u and  have her send a copy of her blood sugar log to me for review on Thursday.  RX sent in.   I will see her in the office next week for follow-up.      Recommend interval growth assessments every 4 weeks and start twice weekly  testing ~30-32 weeks, depending on blood sugar control.  Recommend delivery ~39 weeks or sooner if clinically indicated. All questions answered.      FOLLOW-UP:  Pt scheduled to rto 21 for f/u of blood sugars and BPP    All questions answered.  Time on floor/unit for chart review, patient evaluation, discussion, coordination of care and charting was >30min    Discussed case with DEBI Walters  Diabetic control has been lacking. Improved with scheduled doses. Will continue to adjust  Recent growth scan was appropriate.   For  testing (will start earlier than usual if BS control continues to be poor).

## 2021-06-01 NOTE — PLAN OF CARE
Problem: Adult Inpatient Plan of Care  Goal: Plan of Care Review  Outcome: Ongoing, Progressing  Flowsheets (Taken 6/1/2021 0947)  Progress: improving  Plan of Care Reviewed With:   patient   significant other  Outcome Summary: Blood sugars under 250 with diet modification, metformin and levemir. Pt teaching by diabetic educator, consult from [facility] today, send home with 24 hr urine that should be returned Thursday at follow up appt. Accu checks fasting and 2 hr after meals, NST mykel, call MD with BS greater than 250     Problem: Adult Inpatient Plan of Care  Goal: Patient-Specific Goal (Individualized)  Outcome: Ongoing, Progressing  Flowsheets (Taken 6/1/2021 0947)  Patient-Specific Goals (Include Timeframe): Pt will administer first injection before discharge from hospital  Individualized Care Needs: Pt will be provided education by diabetic educator, insulin adjusted by [facility], and videos on self injestion and diet modifications  Anxieties, Fears or Concerns: Pt has concerns giving herself injections   Goal Outcome Evaluation:  Plan of Care Reviewed With: patient, significant other  Progress: improving  Outcome Summary: Blood sugars under 250 with diet modification, metformin and levemir. Pt teaching by diabetic educator, consult from [facility] today, send home with 24 hr urine that should be returned Thursday at follow up appt. Accu checks fasting and 2 hr after meals, NST mykel, call MD with BS greater than 250

## 2021-06-01 NOTE — DISCHARGE INSTRUCTIONS
-24 hr urine to start Wednesday morning and returned to doctors office on Thursday.  -  medication from pharmacy after discharge  -Send a picture of your blood sugar chart to Norma at [facility] Thursday morning  -Keep appointment with [facility] for Monday.  -Keep appointment with Dr. Valentino Thursday and bring 24hr urine with you.

## 2021-06-01 NOTE — NURSING NOTE
Discharge teaching performed, 24hr urine collection and supplies explained, highlighted appointments and other tasks pt needs to complete and the days they are to be done. Pt voiced understanding

## 2021-06-01 NOTE — PROGRESS NOTES
[facility]  Sandy Page  : 1985  MRN: [id number]  CSN: [id number]    Antepartum Progress Note    Subjective   Sandy Page is a 36 y.o. year old  with an Estimated Date of Delivery: 21 currently at 29w0d who initially presented with elevated blood sugar - patient had checked her own fsbs and presented becasuse her glucometer could not read her level (indicating >600).  The patient currently reports no complaints.  Patient denies any pain or contractions.  Good FM.    Prenatal care has been with Dr. Tr Valentino.  It has been benign.  Patient likely a type II diabetic, but was previously unaware, if that is the case.       Objective     Min/max vitals past 24 hours:   Temp  Min: 97.2 °F (36.2 °C)  Max: 98.9 °F (37.2 °C)  BP  Min: 122/74  Max: 138/81  Pulse  Min: 100  Max: 107  Resp  Min: 16  Max: 16         General: well developed; well nourished  no acute distress   Heart: Not performed.   Lungs: breathing is unlabored   Abdomen: soft, non-tender; no masses   FHT's: reassuring and reactive  135 +accels, no decels, moderate variability   Cervix: was not checked.   Contractions: occasional, but patient unaware   Back: bilateral CVA tenderness is absent      Component      Latest Ref Rng & Units 2021           1:02 PM  1:29 PM  3:21 PM  4:21 PM   Glucose      70 - 130 mg/dL 409 (HH) 385 (H) 286 (H) 260 (H)     Component      Latest Ref Rng & Units 2021           7:01 PM  7:20 AM 10:51 AM  2:28 PM   Glucose      70 - 130 mg/dL 192 (H) 204 (H) 213 (H) 159 (H)     Component      Latest Ref Rng & Units 2021           8:02 PM    Glucose      70 - 130 mg/dL 147 (H) 203 (H)        Assessment   1. IUP at 29w0d  2. Type II DM vs. GDM  3. Obesity in pregnancy  4. Fetus reassuring  5. Patient has order for diabetic counseling/education today.     Plan   1. fsbs improved since addition of insulin yesterday, but will need to  be adjusted further.   2. Patient has an appointment with [facility] this afternoon.  3. Will plan to discharge patient this afternoon and then have her collect 24 hour baseline urine tomorrow.  She has an office appointment with Dr. Valentino Thursday and will be able to bring her urine back to the office at that time.      Nella Paige MD  6/1/2021  08:06 CDT

## 2021-06-01 NOTE — PROGRESS NOTES
Adult Nutrition  Assessment/PES    Patient Name:  Sandy Page  YOB: 1985  MRN: [id number]  Admit Date:  5/30/2021    Assessment Date:  6/1/2021      Reason for Assessment              Reason for Assessment    Reason For Assessment  physician consult     Diagnosis  diabetes diagnosis/complications;pregnancy complications     Identified At Risk by Screening Criteria  no indicators present         Nutrition/Diet History              Nutrition/Diet History    Typical Food/Fluid Intake  Pt eating well with good appetite. States this pregnancy she's craved fruits and vegetables and has been drinking ~1gallon of water daily (didn't realize excessive thirst was symptom of pregnancy).     Meal/Snack Patterns  Gets up ~6/6:30am, but doesn't usually eat until 9/9:30am. Was eating fruity peb. cereal at [facility]         Anthropometrics              Usual Body Weight (UBW)    Usual Body Weight  86.2 kg (190 lb) per review of available wt hx in EPIC. in Jan 2021 at 8 wks pregnant was 190lbs as well as in May 2020 was 190 lbs     Weight Gain  intentional gain ~19 lbs/29 wks        Body Mass Index (BMI)    BMI Assessment  BMI 30-34.9: obesity grade I based on prepregnancy wt         Labs/Tests/Procedures/Meds              Labs/Procedures/Meds    Lab Results Reviewed  reviewed, pertinent     Lab Results Comments  Glucose, A1C 9        Medications    Pertinent Medications Reviewed  reviewed, pertinent     Pertinent Medications Comments  see MAR           Estimated/Assessed Needs              Calculation Measurements    Weight Used For Calculations  94.8 kg (209 lb)        Estimated/Assessed Needs    Additional Documentation  KCAL/KG (Group);Calorie Requirements (Group);Fluid Requirements (Group);Protein Requirements (Group)        Calorie Requirements    Estimated Calorie Requirement (kcal/day)  2273     Estimated Calorie Requirement Comment  0072-1110 kcal/d *per MD 2400 calorie diet/d        KCAL/KG    KCAL/KG   "Specify Kcal/Kg (kcal) 10.9 KCAL/LB        Protein Requirements    Weight Used For Protein Calculations  94.8 kg (209 lb)     Est Protein Requirement Amount (gms/kg)  1.1 gm protein     Estimated Protein Requirements (gms/day)  104.28        Fluid Requirements    Fluid Requirements (mL/day)  2278     Estimated Fluid Requirement Method  RDA Method     RDA Method (mL)  2278         Nutrition Prescription Ordered              Nutrition Prescription PO    Current PO Diet  Regular     Common Modifiers  Consistent Carbohydrate per MD \"no flour/sugar including bread, pasta, dessert, fries, and potatoes.\"     Other Modifiers  Gestational Calorie Level     Gestational Calorie Level  Other (comment) 2400 kcal         Evaluation of Received Nutrient/Fluid Intake              Nutrient/Fluid Evaluation    Number of Days Evaluated  2 days     Additional Documentation  Fluid Intake Evaluation (Group) insuff data        PO Evaluation    Number of Days PO Intake Evaluated  Insufficient Data           Problem/Interventions:  Problem 1              Nutrition Diagnoses Problem 1    Problem 1  Knowledge Deficit     Etiology (related to)  MNT for Treatment/Condition;Medical Diagnosis     Endocrine  GDM;DM2;Hyperglycemia     Signs/Symptoms (evidenced by)  Report/Observation;Biochemical     Other Comment  new diagnosis GDM vs DM2     Labs Reviewed  Done     Specific Labs Noted  Glucose;HgbA1C           Intervention Goal              Intervention Goal    General  Improved nutrition related lab(s);Meet nutritional needs for age/condition     Weight  Support appropriate growth support healthy fetal development         Nutrition Intervention              Nutrition Intervention    RD/Tech Action  Follow Tx progress;Care plan reviewd;Encourage intake           Education/Evaluation              Education    Education  Provided education regarding;Education topics;Advised regarding habits/behavior;Other (comment) Pt with carb counting booklet at " bedside and handout from [employer] (DEBI Walters) with recommendations for </45-50gm CHO at meals and </15gm CHO snacks.     Education Topics  Basic nutrition;Diabetes     Advised Regarding Habits/Behavior  Activity;Food choices;Label reading;Appropriate beverage;Appropriate portions;Eating pattern;Snacks;Meal planning Reiterated CHO recommendations for meals/snacks. Discussed portions of carbohydrate foods. Encouraged avoidance processed foods, refined sugars, fried foods, etc. Encouraged whole fresh foods and appropriate portions of appropriate carb containing foods.        Monitor/Evaluation    Monitor  Per protocol     Education Follow-up  Reinforce PRN           Electronically signed by:  Alejandra Giles RDN, NAKIA  06/01/21 12:14 CDT

## 2021-06-02 PROBLEM — Z34.90 PREGNANCY: Status: ACTIVE | Noted: 2021-06-02

## 2021-06-03 ENCOUNTER — ROUTINE PRENATAL (OUTPATIENT)
Dept: OBSTETRICS AND GYNECOLOGY | Facility: CLINIC | Age: 36
End: 2021-06-03

## 2021-06-03 VITALS — BODY MASS INDEX: 36.05 KG/M2 | WEIGHT: 210 LBS | SYSTOLIC BLOOD PRESSURE: 104 MMHG | DIASTOLIC BLOOD PRESSURE: 70 MMHG

## 2021-06-03 DIAGNOSIS — O09.529 AMA (ADVANCED MATERNAL AGE) MULTIGRAVIDA 35+, UNSPECIFIED TRIMESTER: ICD-10-CM

## 2021-06-03 DIAGNOSIS — O24.113 PREGNANCY WITH TYPE 2 DIABETES MELLITUS IN THIRD TRIMESTER: Primary | ICD-10-CM

## 2021-06-03 PROBLEM — Z34.90 PREGNANCY: Status: RESOLVED | Noted: 2021-06-02 | Resolved: 2021-06-03

## 2021-06-03 LAB
GLUCOSE UR STRIP-MCNC: NEGATIVE MG/DL
INSULIN SERPL-ACNC: 28 UIU/ML
PROT UR STRIP-MCNC: ABNORMAL MG/DL

## 2021-06-03 PROCEDURE — 0502F SUBSEQUENT PRENATAL CARE: CPT | Performed by: OBSTETRICS & GYNECOLOGY

## 2021-06-03 RX ORDER — INSULIN GLARGINE 100 [IU]/ML
45 INJECTION, SOLUTION SUBCUTANEOUS NIGHTLY
COMMUNITY
Start: 2021-06-01 | End: 2021-07-08

## 2021-06-03 NOTE — PROGRESS NOTES
Good fetal movement  Was hospitalized for 2 days with newly diagnosed type 2 diabetes  Checking blood sugar, now on 30 units Lantus, followed by MFM, next appointment Monday  Discuss Tdap next visit  Turned in baseline 24 hour urine today  Discussed [place] contractions    Diagnoses and all orders for this visit:    1. Pregnancy with type 2 diabetes mellitus in third trimester (Primary)  -     Protein, Urine, 24 Hour - Urine, Clean Catch  -     Creatinine Clearance - Urine, Clean Catch  -     Creatinine, Serum    2. AMA (advanced maternal age) multigravida 35+, unspecified trimester

## 2021-06-04 LAB
CREAT 24H UR-MRATE: 1.05 G/24 HR (ref 0.7–1.6)
CREAT CL 24H UR+SERPL-VRATE: 165.2 ML/MIN
CREAT SERPL-MCNC: 0.44 MG/DL (ref 0.57–1)
CREAT UR-MCNC: 87.2 MG/DL
PROT 24H UR-MRATE: 564 MG/24HOURS (ref 0–150)
PROT UR-MCNC: 47 MG/DL

## 2021-06-06 LAB
INSULIN FREE SERPL-ACNC: 38 UU/ML
INSULIN SERPL-ACNC: 42 UU/ML

## 2021-06-10 ENCOUNTER — ROUTINE PRENATAL (OUTPATIENT)
Dept: OBSTETRICS AND GYNECOLOGY | Facility: CLINIC | Age: 36
End: 2021-06-10

## 2021-06-10 VITALS — BODY MASS INDEX: 37.25 KG/M2 | SYSTOLIC BLOOD PRESSURE: 118 MMHG | WEIGHT: 217 LBS | DIASTOLIC BLOOD PRESSURE: 60 MMHG

## 2021-06-10 DIAGNOSIS — O24.113 PREGNANCY WITH TYPE 2 DIABETES MELLITUS IN THIRD TRIMESTER: Primary | ICD-10-CM

## 2021-06-10 DIAGNOSIS — O09.529 AMA (ADVANCED MATERNAL AGE) MULTIGRAVIDA 35+, UNSPECIFIED TRIMESTER: ICD-10-CM

## 2021-06-10 LAB
GLUCOSE UR STRIP-MCNC: ABNORMAL MG/DL
PROT UR STRIP-MCNC: ABNORMAL MG/DL

## 2021-06-10 PROCEDURE — 90715 TDAP VACCINE 7 YRS/> IM: CPT | Performed by: OBSTETRICS & GYNECOLOGY

## 2021-06-10 PROCEDURE — 0502F SUBSEQUENT PRENATAL CARE: CPT | Performed by: OBSTETRICS & GYNECOLOGY

## 2021-06-10 PROCEDURE — 90471 IMMUNIZATION ADMIN: CPT | Performed by: OBSTETRICS & GYNECOLOGY

## 2021-06-10 RX ORDER — INSULIN LISPRO 100 [IU]/ML
40 INJECTION, SOLUTION INTRAVENOUS; SUBCUTANEOUS
COMMUNITY
Start: 2021-06-07 | End: 2021-07-31 | Stop reason: HOSPADM

## 2021-06-10 RX ORDER — INSULIN LISPRO 100 [IU]/ML
10 INJECTION, SOLUTION INTRAVENOUS; SUBCUTANEOUS 3 TIMES DAILY
COMMUNITY
Start: 2021-06-07 | End: 2021-06-24 | Stop reason: SDUPTHER

## 2021-06-10 NOTE — PROGRESS NOTES
Good fetal movement  Reviewed [facility] ultrasound  showing 63% growth  Tdap in office today   labor precautions    Diagnoses and all orders for this visit:    1. Pregnancy with type 2 diabetes mellitus in third trimester (Primary)    2. AMA (advanced maternal age) multigravida 35+, unspecified trimester

## 2021-06-24 ENCOUNTER — ROUTINE PRENATAL (OUTPATIENT)
Dept: OBSTETRICS AND GYNECOLOGY | Facility: CLINIC | Age: 36
End: 2021-06-24

## 2021-06-24 VITALS — BODY MASS INDEX: 37.08 KG/M2 | SYSTOLIC BLOOD PRESSURE: 118 MMHG | WEIGHT: 216 LBS | DIASTOLIC BLOOD PRESSURE: 74 MMHG

## 2021-06-24 DIAGNOSIS — O24.113 PREGNANCY WITH TYPE 2 DIABETES MELLITUS IN THIRD TRIMESTER: ICD-10-CM

## 2021-06-24 DIAGNOSIS — O09.529 AMA (ADVANCED MATERNAL AGE) MULTIGRAVIDA 35+, UNSPECIFIED TRIMESTER: Primary | ICD-10-CM

## 2021-06-24 DIAGNOSIS — O26.86 PUPP (PRURITIC URTICARIAL PAPULES AND PLAQUES OF PREGNANCY): ICD-10-CM

## 2021-06-24 LAB
GLUCOSE UR STRIP-MCNC: ABNORMAL MG/DL
PROT UR STRIP-MCNC: ABNORMAL MG/DL

## 2021-06-24 PROCEDURE — 0502F SUBSEQUENT PRENATAL CARE: CPT | Performed by: OBSTETRICS & GYNECOLOGY

## 2021-06-24 RX ORDER — TRIAMCINOLONE ACETONIDE 5 MG/G
CREAM TOPICAL 2 TIMES DAILY
Qty: 15 G | Refills: 2 | Status: SHIPPED | OUTPATIENT
Start: 2021-06-24 | End: 2021-08-12

## 2021-06-24 NOTE — PROGRESS NOTES
Good fetal movement  Reviewed MFM note from Monday  24 hour urine with 564mg protein  BPP /, MIKE 23cm  Rx Kenalog for PUPPs   labor precautions    Diagnoses and all orders for this visit:    1. AMA (advanced maternal age) multigravida 35+, unspecified trimester (Primary)    2. Pregnancy with type 2 diabetes mellitus in third trimester

## 2021-07-01 ENCOUNTER — ROUTINE PRENATAL (OUTPATIENT)
Dept: OBSTETRICS AND GYNECOLOGY | Facility: CLINIC | Age: 36
End: 2021-07-01

## 2021-07-01 VITALS — DIASTOLIC BLOOD PRESSURE: 78 MMHG | SYSTOLIC BLOOD PRESSURE: 112 MMHG | BODY MASS INDEX: 37.42 KG/M2 | WEIGHT: 218 LBS

## 2021-07-01 DIAGNOSIS — O24.113 PREGNANCY WITH TYPE 2 DIABETES MELLITUS IN THIRD TRIMESTER: Primary | ICD-10-CM

## 2021-07-01 DIAGNOSIS — O09.529 AMA (ADVANCED MATERNAL AGE) MULTIGRAVIDA 35+, UNSPECIFIED TRIMESTER: ICD-10-CM

## 2021-07-01 LAB
GLUCOSE UR STRIP-MCNC: ABNORMAL MG/DL
PROT UR STRIP-MCNC: ABNORMAL MG/DL

## 2021-07-01 PROCEDURE — 0502F SUBSEQUENT PRENATAL CARE: CPT | Performed by: OBSTETRICS & GYNECOLOGY

## 2021-07-01 NOTE — PROGRESS NOTES
Good fetal movement  BPP 8/8, MIKE 22cm  MFM managing blood sugars and insulin   labor precautions    Diagnoses and all orders for this visit:    1. Pregnancy with type 2 diabetes mellitus in third trimester (Primary)    2. AMA (advanced maternal age) multigravida 35+, unspecified trimester

## 2021-07-08 ENCOUNTER — ROUTINE PRENATAL (OUTPATIENT)
Dept: OBSTETRICS AND GYNECOLOGY | Facility: CLINIC | Age: 36
End: 2021-07-08

## 2021-07-08 VITALS — DIASTOLIC BLOOD PRESSURE: 78 MMHG | BODY MASS INDEX: 38.45 KG/M2 | WEIGHT: 224 LBS | SYSTOLIC BLOOD PRESSURE: 116 MMHG

## 2021-07-08 DIAGNOSIS — O24.113 PREGNANCY WITH TYPE 2 DIABETES MELLITUS IN THIRD TRIMESTER: Primary | ICD-10-CM

## 2021-07-08 DIAGNOSIS — O09.529 AMA (ADVANCED MATERNAL AGE) MULTIGRAVIDA 35+, UNSPECIFIED TRIMESTER: ICD-10-CM

## 2021-07-08 LAB
GLUCOSE UR STRIP-MCNC: NEGATIVE MG/DL
PROT UR STRIP-MCNC: ABNORMAL MG/DL

## 2021-07-08 PROCEDURE — 0502F SUBSEQUENT PRENATAL CARE: CPT | Performed by: OBSTETRICS & GYNECOLOGY

## 2021-07-08 RX ORDER — INSULIN GLARGINE 100 [IU]/ML
70 INJECTION, SOLUTION SUBCUTANEOUS 2 TIMES DAILY
COMMUNITY
Start: 2021-07-06 | End: 2021-07-31 | Stop reason: HOSPADM

## 2021-07-08 NOTE — PROGRESS NOTES
Good fetal movement  Labor precautions  Reviewed MFM growth US, 92%  BPP 8/8, MIKE 34.9cm      Diagnoses and all orders for this visit:    1. Pregnancy with type 2 diabetes mellitus in third trimester (Primary)    2. AMA (advanced maternal age) multigravida 35+, unspecified trimester

## 2021-07-15 ENCOUNTER — ROUTINE PRENATAL (OUTPATIENT)
Dept: OBSTETRICS AND GYNECOLOGY | Facility: CLINIC | Age: 36
End: 2021-07-15

## 2021-07-15 VITALS — BODY MASS INDEX: 38.96 KG/M2 | SYSTOLIC BLOOD PRESSURE: 128 MMHG | WEIGHT: 227 LBS | DIASTOLIC BLOOD PRESSURE: 78 MMHG

## 2021-07-15 DIAGNOSIS — O09.529 AMA (ADVANCED MATERNAL AGE) MULTIGRAVIDA 35+, UNSPECIFIED TRIMESTER: ICD-10-CM

## 2021-07-15 DIAGNOSIS — O24.113 PREGNANCY WITH TYPE 2 DIABETES MELLITUS IN THIRD TRIMESTER: Primary | ICD-10-CM

## 2021-07-15 LAB
GLUCOSE UR STRIP-MCNC: ABNORMAL MG/DL
PROT UR STRIP-MCNC: ABNORMAL MG/DL

## 2021-07-15 PROCEDURE — 0502F SUBSEQUENT PRENATAL CARE: CPT | Performed by: OBSTETRICS & GYNECOLOGY

## 2021-07-15 NOTE — PROGRESS NOTES
Good fetal movement  Labor precautions  BPP 8/8, MIKE 26cm  MFM managing insulin  GBS and cx's next visit    There are no diagnoses linked to this encounter.

## 2021-07-22 ENCOUNTER — ROUTINE PRENATAL (OUTPATIENT)
Dept: OBSTETRICS AND GYNECOLOGY | Facility: CLINIC | Age: 36
End: 2021-07-22

## 2021-07-22 VITALS — SYSTOLIC BLOOD PRESSURE: 108 MMHG | BODY MASS INDEX: 39.14 KG/M2 | WEIGHT: 228 LBS | DIASTOLIC BLOOD PRESSURE: 74 MMHG

## 2021-07-22 DIAGNOSIS — O24.113 PREGNANCY WITH TYPE 2 DIABETES MELLITUS IN THIRD TRIMESTER: Primary | ICD-10-CM

## 2021-07-22 LAB
GLUCOSE UR STRIP-MCNC: ABNORMAL MG/DL
PROT UR STRIP-MCNC: ABNORMAL MG/DL

## 2021-07-22 PROCEDURE — 0502F SUBSEQUENT PRENATAL CARE: CPT | Performed by: OBSTETRICS & GYNECOLOGY

## 2021-07-22 NOTE — PROGRESS NOTES
Good fetal movement  Has had a few contractions  Cervix soft, mid position  GBS and GC/Chl ordered and done  BPP 8/8, MIKE 27cm  Labor instructions    Diagnoses and all orders for this visit:    1. Pregnancy with type 2 diabetes mellitus in third trimester (Primary)  -     Chlamydia trachomatis, Neisseria gonorrhoeae, PCR w/ confirmation - Swab, Cervix  -     Strep B Screen - Swab, Vaginal/Rectum

## 2021-07-25 LAB
C TRACH RRNA SPEC QL NAA+PROBE: NEGATIVE
GP B STREP DNA SPEC QL NAA+PROBE: NEGATIVE
N GONORRHOEA RRNA SPEC QL NAA+PROBE: NEGATIVE

## 2021-07-26 ENCOUNTER — ANESTHESIA EVENT (OUTPATIENT)
Dept: LABOR AND DELIVERY | Facility: HOSPITAL | Age: 36
End: 2021-07-26

## 2021-07-26 ENCOUNTER — HOSPITAL ENCOUNTER (INPATIENT)
Facility: HOSPITAL | Age: 36
LOS: 5 days | Discharge: HOME | End: 2021-07-31
Attending: OBSTETRICS & GYNECOLOGY | Admitting: OBSTETRICS & GYNECOLOGY

## 2021-07-26 ENCOUNTER — ANESTHESIA (OUTPATIENT)
Dept: LABOR AND DELIVERY | Facility: HOSPITAL | Age: 36
End: 2021-07-26

## 2021-07-26 PROBLEM — E11.9 TYPE 2 DIABETES MELLITUS WITHOUT COMPLICATION, WITHOUT LONG-TERM CURRENT USE OF INSULIN: Status: ACTIVE | Noted: 2021-07-26

## 2021-07-26 PROBLEM — O13.3 GESTATIONAL HYPERTENSION, THIRD TRIMESTER: Status: ACTIVE | Noted: 2021-07-26

## 2021-07-26 LAB
ABO GROUP BLD: NORMAL
BLD GP AB SCN SERPL QL: NEGATIVE
DEPRECATED RDW RBC AUTO: 46.8 FL (ref 37–54)
ERYTHROCYTE [DISTWIDTH] IN BLOOD BY AUTOMATED COUNT: 15.2 % (ref 12.3–15.4)
GLUCOSE BLDC GLUCOMTR-MCNC: 115 MG/DL (ref 70–130)
GLUCOSE BLDC GLUCOMTR-MCNC: 121 MG/DL (ref 70–130)
GLUCOSE BLDC GLUCOMTR-MCNC: 168 MG/DL (ref 70–130)
HCT VFR BLD AUTO: 32.3 % (ref 34–46.6)
HGB BLD-MCNC: 11.1 G/DL (ref 12–15.9)
MCH RBC QN AUTO: 29.4 PG (ref 26.6–33)
MCHC RBC AUTO-ENTMCNC: 34.4 G/DL (ref 31.5–35.7)
MCV RBC AUTO: 85.7 FL (ref 79–97)
PLATELET # BLD AUTO: 231 10*3/MM3 (ref 140–450)
PMV BLD AUTO: 12.6 FL (ref 6–12)
RBC # BLD AUTO: 3.77 10*6/MM3 (ref 3.77–5.28)
RH BLD: POSITIVE
T&S EXPIRATION DATE: NORMAL
WBC # BLD AUTO: 14.36 10*3/MM3 (ref 3.4–10.8)

## 2021-07-26 PROCEDURE — 86850 RBC ANTIBODY SCREEN: CPT | Performed by: OBSTETRICS & GYNECOLOGY

## 2021-07-26 PROCEDURE — C1755 CATHETER, INTRASPINAL: HCPCS | Performed by: NURSE ANESTHETIST, CERTIFIED REGISTERED

## 2021-07-26 PROCEDURE — 63710000001 INSULIN LISPRO (HUMAN) PER 5 UNITS: Performed by: OBSTETRICS & GYNECOLOGY

## 2021-07-26 PROCEDURE — 25010000002 BUTORPHANOL PER 1 MG: Performed by: OBSTETRICS & GYNECOLOGY

## 2021-07-26 PROCEDURE — 25010000002 ROPIVACAINE PER 1 MG: Performed by: NURSE ANESTHETIST, CERTIFIED REGISTERED

## 2021-07-26 PROCEDURE — 3E033VJ INTRODUCTION OF OTHER HORMONE INTO PERIPHERAL VEIN, PERCUTANEOUS APPROACH: ICD-10-PCS | Performed by: OBSTETRICS & GYNECOLOGY

## 2021-07-26 PROCEDURE — 82962 GLUCOSE BLOOD TEST: CPT

## 2021-07-26 PROCEDURE — 85027 COMPLETE CBC AUTOMATED: CPT | Performed by: OBSTETRICS & GYNECOLOGY

## 2021-07-26 PROCEDURE — 86900 BLOOD TYPING SEROLOGIC ABO: CPT | Performed by: OBSTETRICS & GYNECOLOGY

## 2021-07-26 PROCEDURE — 86901 BLOOD TYPING SEROLOGIC RH(D): CPT | Performed by: OBSTETRICS & GYNECOLOGY

## 2021-07-26 RX ORDER — ONDANSETRON 4 MG/1
4 TABLET, FILM COATED ORAL EVERY 6 HOURS PRN
Status: DISCONTINUED | OUTPATIENT
Start: 2021-07-26 | End: 2021-07-27 | Stop reason: HOSPADM

## 2021-07-26 RX ORDER — OXYTOCIN/0.9 % SODIUM CHLORIDE 30/500 ML
250 PLASTIC BAG, INJECTION (ML) INTRAVENOUS CONTINUOUS
Status: ACTIVE | OUTPATIENT
Start: 2021-07-26 | End: 2021-07-26

## 2021-07-26 RX ORDER — MISOPROSTOL 200 UG/1
800 TABLET ORAL AS NEEDED
Status: DISCONTINUED | OUTPATIENT
Start: 2021-07-26 | End: 2021-07-27 | Stop reason: HOSPADM

## 2021-07-26 RX ORDER — SODIUM CHLORIDE 0.9 % (FLUSH) 0.9 %
1-10 SYRINGE (ML) INJECTION AS NEEDED
Status: DISCONTINUED | OUTPATIENT
Start: 2021-07-26 | End: 2021-07-27 | Stop reason: HOSPADM

## 2021-07-26 RX ORDER — OXYTOCIN/0.9 % SODIUM CHLORIDE 30/500 ML
999 PLASTIC BAG, INJECTION (ML) INTRAVENOUS ONCE
Status: DISCONTINUED | OUTPATIENT
Start: 2021-07-26 | End: 2021-07-27 | Stop reason: HOSPADM

## 2021-07-26 RX ORDER — LIDOCAINE HYDROCHLORIDE 10 MG/ML
5 INJECTION, SOLUTION EPIDURAL; INFILTRATION; INTRACAUDAL; PERINEURAL AS NEEDED
Status: DISCONTINUED | OUTPATIENT
Start: 2021-07-26 | End: 2021-07-27 | Stop reason: HOSPADM

## 2021-07-26 RX ORDER — ACETAMINOPHEN 325 MG/1
650 TABLET ORAL EVERY 4 HOURS PRN
Status: DISCONTINUED | OUTPATIENT
Start: 2021-07-26 | End: 2021-07-27 | Stop reason: HOSPADM

## 2021-07-26 RX ORDER — OXYTOCIN/0.9 % SODIUM CHLORIDE 30/500 ML
125 PLASTIC BAG, INJECTION (ML) INTRAVENOUS CONTINUOUS PRN
Status: COMPLETED | OUTPATIENT
Start: 2021-07-26 | End: 2021-07-27

## 2021-07-26 RX ORDER — ONDANSETRON 2 MG/ML
4 INJECTION INTRAMUSCULAR; INTRAVENOUS EVERY 6 HOURS PRN
Status: DISCONTINUED | OUTPATIENT
Start: 2021-07-26 | End: 2021-07-27 | Stop reason: HOSPADM

## 2021-07-26 RX ORDER — TRISODIUM CITRATE DIHYDRATE AND CITRIC ACID MONOHYDRATE 500; 334 MG/5ML; MG/5ML
30 SOLUTION ORAL ONCE
Status: DISCONTINUED | OUTPATIENT
Start: 2021-07-26 | End: 2021-07-27 | Stop reason: SDUPTHER

## 2021-07-26 RX ORDER — SODIUM CHLORIDE, SODIUM LACTATE, POTASSIUM CHLORIDE, CALCIUM CHLORIDE 600; 310; 30; 20 MG/100ML; MG/100ML; MG/100ML; MG/100ML
125 INJECTION, SOLUTION INTRAVENOUS CONTINUOUS
Status: DISCONTINUED | OUTPATIENT
Start: 2021-07-26 | End: 2021-07-27

## 2021-07-26 RX ORDER — FAMOTIDINE 10 MG/ML
20 INJECTION, SOLUTION INTRAVENOUS ONCE AS NEEDED
Status: CANCELLED | OUTPATIENT
Start: 2021-07-26

## 2021-07-26 RX ORDER — LIDOCAINE HYDROCHLORIDE AND EPINEPHRINE 15; 5 MG/ML; UG/ML
INJECTION, SOLUTION EPIDURAL
Status: COMPLETED | OUTPATIENT
Start: 2021-07-26 | End: 2021-07-26

## 2021-07-26 RX ORDER — EPHEDRINE SULFATE 50 MG/ML
10 INJECTION, SOLUTION INTRAVENOUS
Status: DISCONTINUED | OUTPATIENT
Start: 2021-07-26 | End: 2021-07-27 | Stop reason: HOSPADM

## 2021-07-26 RX ORDER — CARBOPROST TROMETHAMINE 250 UG/ML
250 INJECTION, SOLUTION INTRAMUSCULAR AS NEEDED
Status: DISCONTINUED | OUTPATIENT
Start: 2021-07-26 | End: 2021-07-27 | Stop reason: HOSPADM

## 2021-07-26 RX ORDER — TERBUTALINE SULFATE 1 MG/ML
0.25 INJECTION, SOLUTION SUBCUTANEOUS AS NEEDED
Status: DISCONTINUED | OUTPATIENT
Start: 2021-07-26 | End: 2021-07-27 | Stop reason: HOSPADM

## 2021-07-26 RX ORDER — OXYTOCIN/0.9 % SODIUM CHLORIDE 30/500 ML
2-20 PLASTIC BAG, INJECTION (ML) INTRAVENOUS
Status: DISCONTINUED | OUTPATIENT
Start: 2021-07-26 | End: 2021-07-27 | Stop reason: HOSPADM

## 2021-07-26 RX ORDER — BUTORPHANOL TARTRATE 1 MG/ML
1 INJECTION, SOLUTION INTRAMUSCULAR; INTRAVENOUS
Status: DISCONTINUED | OUTPATIENT
Start: 2021-07-26 | End: 2021-07-27 | Stop reason: HOSPADM

## 2021-07-26 RX ORDER — BUTORPHANOL TARTRATE 1 MG/ML
2 INJECTION, SOLUTION INTRAMUSCULAR; INTRAVENOUS
Status: DISCONTINUED | OUTPATIENT
Start: 2021-07-26 | End: 2021-07-27 | Stop reason: HOSPADM

## 2021-07-26 RX ORDER — IBUPROFEN 800 MG/1
800 TABLET ORAL EVERY 8 HOURS PRN
Status: DISCONTINUED | OUTPATIENT
Start: 2021-07-26 | End: 2021-07-27 | Stop reason: HOSPADM

## 2021-07-26 RX ORDER — SODIUM CHLORIDE 0.9 % (FLUSH) 0.9 %
10 SYRINGE (ML) INJECTION EVERY 12 HOURS SCHEDULED
Status: DISCONTINUED | OUTPATIENT
Start: 2021-07-26 | End: 2021-07-27 | Stop reason: HOSPADM

## 2021-07-26 RX ADMIN — SODIUM CHLORIDE, POTASSIUM CHLORIDE, SODIUM LACTATE AND CALCIUM CHLORIDE 999 ML/HR: 600; 310; 30; 20 INJECTION, SOLUTION INTRAVENOUS at 22:22

## 2021-07-26 RX ADMIN — BUTORPHANOL TARTRATE 1 MG: 1 INJECTION, SOLUTION INTRAMUSCULAR; INTRAVENOUS at 21:21

## 2021-07-26 RX ADMIN — ROPIVACAINE HYDROCHLORIDE 9 ML/HR: 2 INJECTION, SOLUTION EPIDURAL; INFILTRATION at 23:25

## 2021-07-26 RX ADMIN — SODIUM CHLORIDE, POTASSIUM CHLORIDE, SODIUM LACTATE AND CALCIUM CHLORIDE 125 ML/HR: 600; 310; 30; 20 INJECTION, SOLUTION INTRAVENOUS at 17:35

## 2021-07-26 RX ADMIN — SODIUM CHLORIDE, POTASSIUM CHLORIDE, SODIUM LACTATE AND CALCIUM CHLORIDE 999 ML/HR: 600; 310; 30; 20 INJECTION, SOLUTION INTRAVENOUS at 23:28

## 2021-07-26 RX ADMIN — OXYTOCIN-SODIUM CHLORIDE 0.9% IV SOLN 30 UNIT/500ML 2 MILLI-UNITS/MIN: 30-0.9/5 SOLUTION at 17:59

## 2021-07-26 RX ADMIN — INSULIN LISPRO 4 UNITS: 100 INJECTION, SOLUTION INTRAVENOUS; SUBCUTANEOUS at 17:25

## 2021-07-26 RX ADMIN — LIDOCAINE HYDROCHLORIDE AND EPINEPHRINE 3 ML: 15; 5 INJECTION, SOLUTION EPIDURAL at 23:17

## 2021-07-27 LAB
GLUCOSE BLDC GLUCOMTR-MCNC: 117 MG/DL (ref 70–130)
GLUCOSE BLDC GLUCOMTR-MCNC: 120 MG/DL (ref 70–130)
GLUCOSE BLDC GLUCOMTR-MCNC: 121 MG/DL (ref 70–130)
GLUCOSE BLDC GLUCOMTR-MCNC: 130 MG/DL (ref 70–130)
GLUCOSE BLDC GLUCOMTR-MCNC: 212 MG/DL (ref 70–130)
GLUCOSE BLDC GLUCOMTR-MCNC: 248 MG/DL (ref 70–130)

## 2021-07-27 PROCEDURE — 10907ZC DRAINAGE OF AMNIOTIC FLUID, THERAPEUTIC FROM PRODUCTS OF CONCEPTION, VIA NATURAL OR ARTIFICIAL OPENING: ICD-10-PCS | Performed by: OBSTETRICS & GYNECOLOGY

## 2021-07-27 PROCEDURE — 25010000002 ONDANSETRON PER 1 MG: Performed by: OBSTETRICS & GYNECOLOGY

## 2021-07-27 PROCEDURE — 94799 UNLISTED PULMONARY SVC/PX: CPT

## 2021-07-27 PROCEDURE — 25010000002 HYDROMORPHONE 1 MG/ML SOLUTION: Performed by: NURSE ANESTHETIST, CERTIFIED REGISTERED

## 2021-07-27 PROCEDURE — 25010000002 FENTANYL CITRATE (PF) 100 MCG/2ML SOLUTION: Performed by: NURSE ANESTHETIST, CERTIFIED REGISTERED

## 2021-07-27 PROCEDURE — 63710000001 INSULIN LISPRO (HUMAN) PER 5 UNITS: Performed by: OBSTETRICS & GYNECOLOGY

## 2021-07-27 PROCEDURE — 25010000002 ONDANSETRON PER 1 MG: Performed by: NURSE ANESTHETIST, CERTIFIED REGISTERED

## 2021-07-27 PROCEDURE — 25010000002 PHENYLEPHRINE HCL 0.8 MG/10ML SOLUTION PREFILLED SYRINGE: Performed by: NURSE ANESTHETIST, CERTIFIED REGISTERED

## 2021-07-27 PROCEDURE — 25010000002 ROPIVACAINE PER 1 MG: Performed by: NURSE ANESTHETIST, CERTIFIED REGISTERED

## 2021-07-27 PROCEDURE — 59510 CESAREAN DELIVERY: CPT | Performed by: OBSTETRICS & GYNECOLOGY

## 2021-07-27 PROCEDURE — 25010000002 METOCLOPRAMIDE PER 10 MG: Performed by: NURSE ANESTHETIST, CERTIFIED REGISTERED

## 2021-07-27 PROCEDURE — 82962 GLUCOSE BLOOD TEST: CPT

## 2021-07-27 PROCEDURE — 25010000002 KETOROLAC TROMETHAMINE PER 15 MG: Performed by: OBSTETRICS & GYNECOLOGY

## 2021-07-27 DEVICE — SEAL HEMO SURG ARISTA/AH ABS/PWDR 3GM: Type: IMPLANTABLE DEVICE | Site: ABDOMEN | Status: FUNCTIONAL

## 2021-07-27 RX ORDER — ONDANSETRON 4 MG/1
4 TABLET, FILM COATED ORAL EVERY 6 HOURS PRN
Status: DISCONTINUED | OUTPATIENT
Start: 2021-07-27 | End: 2021-07-31 | Stop reason: HOSPADM

## 2021-07-27 RX ORDER — TRISODIUM CITRATE DIHYDRATE AND CITRIC ACID MONOHYDRATE 500; 334 MG/5ML; MG/5ML
15 SOLUTION ORAL ONCE
Status: COMPLETED | OUTPATIENT
Start: 2021-07-27 | End: 2021-07-27

## 2021-07-27 RX ORDER — OXYCODONE AND ACETAMINOPHEN 10; 325 MG/1; MG/1
1 TABLET ORAL EVERY 4 HOURS PRN
Status: DISCONTINUED | OUTPATIENT
Start: 2021-07-27 | End: 2021-07-31 | Stop reason: HOSPADM

## 2021-07-27 RX ORDER — CARBOPROST TROMETHAMINE 250 UG/ML
250 INJECTION, SOLUTION INTRAMUSCULAR AS NEEDED
Status: DISCONTINUED | OUTPATIENT
Start: 2021-07-27 | End: 2021-07-31 | Stop reason: HOSPADM

## 2021-07-27 RX ORDER — ONDANSETRON 2 MG/ML
4 INJECTION INTRAMUSCULAR; INTRAVENOUS EVERY 6 HOURS PRN
Status: DISCONTINUED | OUTPATIENT
Start: 2021-07-27 | End: 2021-07-31 | Stop reason: HOSPADM

## 2021-07-27 RX ORDER — HYDROMORPHONE HYDROCHLORIDE 1 MG/ML
0.5 INJECTION, SOLUTION INTRAMUSCULAR; INTRAVENOUS; SUBCUTANEOUS
Status: DISCONTINUED | OUTPATIENT
Start: 2021-07-27 | End: 2021-07-31 | Stop reason: HOSPADM

## 2021-07-27 RX ORDER — FAMOTIDINE 10 MG/ML
20 INJECTION, SOLUTION INTRAVENOUS ONCE AS NEEDED
Status: DISCONTINUED | OUTPATIENT
Start: 2021-07-27 | End: 2021-07-28

## 2021-07-27 RX ORDER — BUTORPHANOL TARTRATE 1 MG/ML
0.5 INJECTION, SOLUTION INTRAMUSCULAR; INTRAVENOUS EVERY 6 HOURS PRN
Status: DISCONTINUED | OUTPATIENT
Start: 2021-07-27 | End: 2021-07-31 | Stop reason: HOSPADM

## 2021-07-27 RX ORDER — MISOPROSTOL 200 UG/1
800 TABLET ORAL AS NEEDED
Status: DISCONTINUED | OUTPATIENT
Start: 2021-07-27 | End: 2021-07-31 | Stop reason: HOSPADM

## 2021-07-27 RX ORDER — NALOXONE HCL 0.4 MG/ML
0.4 VIAL (ML) INJECTION
Status: ACTIVE | OUTPATIENT
Start: 2021-07-27 | End: 2021-07-28

## 2021-07-27 RX ORDER — FENTANYL CITRATE 50 UG/ML
INJECTION, SOLUTION INTRAMUSCULAR; INTRAVENOUS AS NEEDED
Status: DISCONTINUED | OUTPATIENT
Start: 2021-07-27 | End: 2021-07-27 | Stop reason: SURG

## 2021-07-27 RX ORDER — OXYTOCIN/0.9 % SODIUM CHLORIDE 30/500 ML
999 PLASTIC BAG, INJECTION (ML) INTRAVENOUS ONCE
Status: DISCONTINUED | OUTPATIENT
Start: 2021-07-27 | End: 2021-07-31 | Stop reason: HOSPADM

## 2021-07-27 RX ORDER — ONDANSETRON 4 MG/1
4 TABLET, FILM COATED ORAL EVERY 8 HOURS PRN
Status: DISCONTINUED | OUTPATIENT
Start: 2021-07-27 | End: 2021-07-31 | Stop reason: HOSPADM

## 2021-07-27 RX ORDER — KETOROLAC TROMETHAMINE 30 MG/ML
30 INJECTION, SOLUTION INTRAMUSCULAR; INTRAVENOUS EVERY 6 HOURS PRN
Status: DISPENSED | OUTPATIENT
Start: 2021-07-27 | End: 2021-07-28

## 2021-07-27 RX ORDER — PHENYLEPHRINE HCL IN 0.9% NACL 0.8MG/10ML
SYRINGE (ML) INTRAVENOUS AS NEEDED
Status: DISCONTINUED | OUTPATIENT
Start: 2021-07-27 | End: 2021-07-27 | Stop reason: SURG

## 2021-07-27 RX ORDER — METOCLOPRAMIDE HYDROCHLORIDE 5 MG/ML
10 INJECTION INTRAMUSCULAR; INTRAVENOUS ONCE
Status: COMPLETED | OUTPATIENT
Start: 2021-07-27 | End: 2021-07-27

## 2021-07-27 RX ORDER — FAMOTIDINE 20 MG/1
20 TABLET, FILM COATED ORAL ONCE AS NEEDED
Status: DISCONTINUED | OUTPATIENT
Start: 2021-07-27 | End: 2021-07-28

## 2021-07-27 RX ORDER — DEXTROSE MONOHYDRATE 25 G/50ML
25 INJECTION, SOLUTION INTRAVENOUS
Status: DISCONTINUED | OUTPATIENT
Start: 2021-07-27 | End: 2021-07-31 | Stop reason: HOSPADM

## 2021-07-27 RX ORDER — LIDOCAINE HYDROCHLORIDE AND EPINEPHRINE 20; 5 MG/ML; UG/ML
INJECTION, SOLUTION EPIDURAL; INFILTRATION; INTRACAUDAL; PERINEURAL AS NEEDED
Status: DISCONTINUED | OUTPATIENT
Start: 2021-07-27 | End: 2021-07-27 | Stop reason: SURG

## 2021-07-27 RX ORDER — OXYTOCIN/0.9 % SODIUM CHLORIDE 30/500 ML
250 PLASTIC BAG, INJECTION (ML) INTRAVENOUS CONTINUOUS
Status: ACTIVE | OUTPATIENT
Start: 2021-07-27 | End: 2021-07-27

## 2021-07-27 RX ORDER — OXYCODONE AND ACETAMINOPHEN 7.5; 325 MG/1; MG/1
1 TABLET ORAL EVERY 4 HOURS PRN
Status: DISPENSED | OUTPATIENT
Start: 2021-07-27 | End: 2021-07-28

## 2021-07-27 RX ORDER — EPHEDRINE SULFATE 50 MG/ML
INJECTION, SOLUTION INTRAVENOUS AS NEEDED
Status: DISCONTINUED | OUTPATIENT
Start: 2021-07-27 | End: 2021-07-27 | Stop reason: SURG

## 2021-07-27 RX ORDER — OXYCODONE HYDROCHLORIDE AND ACETAMINOPHEN 5; 325 MG/1; MG/1
1 TABLET ORAL EVERY 4 HOURS PRN
Status: DISCONTINUED | OUTPATIENT
Start: 2021-07-27 | End: 2021-07-31 | Stop reason: HOSPADM

## 2021-07-27 RX ORDER — OXYTOCIN/0.9 % SODIUM CHLORIDE 30/500 ML
125 PLASTIC BAG, INJECTION (ML) INTRAVENOUS CONTINUOUS PRN
Status: DISCONTINUED | OUTPATIENT
Start: 2021-07-27 | End: 2021-07-31 | Stop reason: HOSPADM

## 2021-07-27 RX ORDER — SIMETHICONE 80 MG
80 TABLET,CHEWABLE ORAL 4 TIMES DAILY PRN
Status: DISCONTINUED | OUTPATIENT
Start: 2021-07-27 | End: 2021-07-31 | Stop reason: HOSPADM

## 2021-07-27 RX ORDER — OXYCODONE AND ACETAMINOPHEN 7.5; 325 MG/1; MG/1
2 TABLET ORAL EVERY 4 HOURS PRN
Status: DISPENSED | OUTPATIENT
Start: 2021-07-27 | End: 2021-07-28

## 2021-07-27 RX ORDER — NICOTINE POLACRILEX 4 MG
15 LOZENGE BUCCAL
Status: DISCONTINUED | OUTPATIENT
Start: 2021-07-27 | End: 2021-07-31 | Stop reason: HOSPADM

## 2021-07-27 RX ORDER — ONDANSETRON 2 MG/ML
INJECTION INTRAMUSCULAR; INTRAVENOUS AS NEEDED
Status: DISCONTINUED | OUTPATIENT
Start: 2021-07-27 | End: 2021-07-27 | Stop reason: SURG

## 2021-07-27 RX ORDER — PRENATAL VIT/IRON FUM/FOLIC AC 27MG-0.8MG
1 TABLET ORAL DAILY
Status: DISCONTINUED | OUTPATIENT
Start: 2021-07-27 | End: 2021-07-31 | Stop reason: HOSPADM

## 2021-07-27 RX ORDER — IBUPROFEN 600 MG/1
600 TABLET ORAL EVERY 8 HOURS PRN
Status: DISCONTINUED | OUTPATIENT
Start: 2021-07-27 | End: 2021-07-31 | Stop reason: HOSPADM

## 2021-07-27 RX ORDER — FAMOTIDINE 10 MG/ML
20 INJECTION, SOLUTION INTRAVENOUS ONCE AS NEEDED
Status: COMPLETED | OUTPATIENT
Start: 2021-07-27 | End: 2021-07-27

## 2021-07-27 RX ORDER — CEFAZOLIN SODIUM IN 0.9 % NACL 3 G/100 ML
3 INTRAVENOUS SOLUTION, PIGGYBACK (ML) INTRAVENOUS EVERY 8 HOURS
Status: DISCONTINUED | OUTPATIENT
Start: 2021-07-27 | End: 2021-07-27

## 2021-07-27 RX ORDER — METHYLERGONOVINE MALEATE 0.2 MG/ML
200 INJECTION INTRAVENOUS ONCE AS NEEDED
Status: DISCONTINUED | OUTPATIENT
Start: 2021-07-27 | End: 2021-07-31 | Stop reason: HOSPADM

## 2021-07-27 RX ORDER — FAMOTIDINE 10 MG/ML
10 INJECTION, SOLUTION INTRAVENOUS ONCE
Status: COMPLETED | OUTPATIENT
Start: 2021-07-27 | End: 2021-07-27

## 2021-07-27 RX ADMIN — ROPIVACAINE HYDROCHLORIDE 9 ML/HR: 2 INJECTION, SOLUTION EPIDURAL; INFILTRATION at 10:40

## 2021-07-27 RX ADMIN — SODIUM CITRATE AND CITRIC ACID MONOHYDRATE 30 ML: 500; 334 SOLUTION ORAL at 11:29

## 2021-07-27 RX ADMIN — OXYTOCIN-SODIUM CHLORIDE 0.9% IV SOLN 30 UNIT/500ML 125 ML/HR: 30-0.9/5 SOLUTION at 12:59

## 2021-07-27 RX ADMIN — SODIUM CHLORIDE, POTASSIUM CHLORIDE, SODIUM LACTATE AND CALCIUM CHLORIDE: 600; 310; 30; 20 INJECTION, SOLUTION INTRAVENOUS at 12:01

## 2021-07-27 RX ADMIN — ONDANSETRON 8 MG: 2 INJECTION INTRAMUSCULAR; INTRAVENOUS at 11:28

## 2021-07-27 RX ADMIN — Medication 80 MCG: at 12:07

## 2021-07-27 RX ADMIN — LIDOCAINE HYDROCHLORIDE AND EPINEPHRINE 5 ML: 20; 5 INJECTION, SOLUTION EPIDURAL; INFILTRATION; INTRACAUDAL; PERINEURAL at 11:34

## 2021-07-27 RX ADMIN — CEFAZOLIN 3 G: 1 INJECTION, POWDER, FOR SOLUTION INTRAMUSCULAR; INTRAVENOUS; PARENTERAL at 11:36

## 2021-07-27 RX ADMIN — SODIUM CHLORIDE, POTASSIUM CHLORIDE, SODIUM LACTATE AND CALCIUM CHLORIDE 125 ML/HR: 600; 310; 30; 20 INJECTION, SOLUTION INTRAVENOUS at 02:39

## 2021-07-27 RX ADMIN — METOCLOPRAMIDE HYDROCHLORIDE 10 MG: 5 INJECTION INTRAMUSCULAR; INTRAVENOUS at 21:29

## 2021-07-27 RX ADMIN — Medication 80 MCG: at 12:19

## 2021-07-27 RX ADMIN — FAMOTIDINE 20 MG: 10 INJECTION, SOLUTION INTRAVENOUS at 11:28

## 2021-07-27 RX ADMIN — Medication 80 MCG: at 12:13

## 2021-07-27 RX ADMIN — FENTANYL CITRATE 100 MCG: 50 INJECTION, SOLUTION INTRAMUSCULAR; INTRAVENOUS at 11:34

## 2021-07-27 RX ADMIN — ACETAMINOPHEN 650 MG: 325 TABLET, FILM COATED ORAL at 02:37

## 2021-07-27 RX ADMIN — HYDROMORPHONE HYDROCHLORIDE 500 MCG: 1 INJECTION, SOLUTION INTRAMUSCULAR; INTRAVENOUS; SUBCUTANEOUS at 11:49

## 2021-07-27 RX ADMIN — HYDROMORPHONE HYDROCHLORIDE 500 MCG: 1 INJECTION, SOLUTION INTRAMUSCULAR; INTRAVENOUS; SUBCUTANEOUS at 11:56

## 2021-07-27 RX ADMIN — OXYCODONE HYDROCHLORIDE AND ACETAMINOPHEN 1 TABLET: 7.5; 325 TABLET ORAL at 13:41

## 2021-07-27 RX ADMIN — INSULIN LISPRO 4 UNITS: 100 INJECTION, SOLUTION INTRAVENOUS; SUBCUTANEOUS at 17:06

## 2021-07-27 RX ADMIN — FAMOTIDINE 10 MG: 10 INJECTION INTRAVENOUS at 21:29

## 2021-07-27 RX ADMIN — Medication 40 MCG: at 12:01

## 2021-07-27 RX ADMIN — Medication 40 MCG: at 11:53

## 2021-07-27 RX ADMIN — ONDANSETRON 4 MG: 2 INJECTION INTRAMUSCULAR; INTRAVENOUS at 17:13

## 2021-07-27 RX ADMIN — LIDOCAINE HYDROCHLORIDE AND EPINEPHRINE 10 ML: 20; 5 INJECTION, SOLUTION EPIDURAL; INFILTRATION; INTRACAUDAL; PERINEURAL at 11:28

## 2021-07-27 RX ADMIN — SODIUM CHLORIDE, POTASSIUM CHLORIDE, SODIUM LACTATE AND CALCIUM CHLORIDE 125 ML/HR: 600; 310; 30; 20 INJECTION, SOLUTION INTRAVENOUS at 12:59

## 2021-07-27 RX ADMIN — KETOROLAC TROMETHAMINE 30 MG: 30 INJECTION, SOLUTION INTRAMUSCULAR at 17:13

## 2021-07-27 RX ADMIN — Medication 40 MCG: at 12:04

## 2021-07-27 RX ADMIN — EPHEDRINE SULFATE 10 MG: 50 INJECTION INTRAVENOUS at 12:33

## 2021-07-27 RX ADMIN — EPHEDRINE SULFATE 25 MG: 50 INJECTION INTRAVENOUS at 12:34

## 2021-07-27 NOTE — ANESTHESIA PROCEDURE NOTES
Labor Epidural    Pre-sedation assessment completed: 7/26/2021 10:49 PM    Patient reassessed immediately prior to procedure    Patient location during procedure: OB  Start Time: 7/26/2021 10:49 PM  Stop Time: 7/26/2021 10:27 PM  Indication:at surgeon's request  Performed By  CRNA: Sascha Persaud CRNA  Preanesthetic Checklist  Completed: patient identified, IV checked, site marked, risks and benefits discussed, surgical consent, monitors and equipment checked, pre-op evaluation and timeout performed  Additional Notes  Loss Of Resistance Technique utilized.  Negative heme, csf, or paresthesias.  Test dose given and negative.  Prep:  Pt Position:sitting  Sterile Tech:mask, sterile barrier and gloves  Prep:chlorhexidine gluconate and isopropyl alcohol  Monitoring:blood pressure monitoring, continuous pulse oximetry and EKG  Epidural Block Procedure:  Approach:midline  Guidance:palpation technique  Location:L3-L4  Needle Type:Tuohy  Needle Gauge:18 G  Loss of Resistance Medium: air  Loss of Resistance: 8cm  Cath Depth at skin:15 cm  Paresthesia: right and transient  Aspiration:negative  Test Dose:negative  Test dose medication: lidocaine 1.5%-EPINEPHrine 1:200,000 (XYLOCAINE W/EPI) injection, 3 mL  Med administered at 7/26/2021 11:17 PM  Number of Attempts: 3  Post Assessment:  Dressing:biopatch applied, occlusive dressing applied and secured with tape  Pt Tolerance:patient tolerated the procedure well with no apparent complications  Complications:no

## 2021-07-27 NOTE — ANESTHESIA PREPROCEDURE EVALUATION
Anesthesia Evaluation     Patient summary reviewed and Nursing notes reviewed   NPO Solid Status: > 8 hours  NPO Liquid Status: > 8 hours           Airway   Mallampati: II  No difficulty expected  Dental      Pulmonary - negative pulmonary ROS   (-) asthma, not a smoker  Cardiovascular - negative cardio ROS    (-) valvular problems/murmurs, dysrhythmias      Neuro/Psych  (+) headaches,     (-) seizures    ROS Comment: Pseudotumor cerebri  S/p  shunt  GI/Hepatic/Renal/Endo    (+) obesity, morbid obesity,  renal disease stones, diabetes mellitus poorly controlled using insulin,     Musculoskeletal (-) negative ROS    Abdominal    Substance History - negative use     OB/GYN    (+) Pregnant,         Other                        Anesthesia Plan    ASA 3     epidural   (Previous labor epidural successful s/p  shunt)    Anesthetic plan, all risks, benefits, and alternatives have been provided, discussed and informed consent has been obtained with: patient.

## 2021-07-28 LAB
ANISOCYTOSIS BLD QL: ABNORMAL
DEPRECATED RDW RBC AUTO: 49.1 FL (ref 37–54)
ERYTHROCYTE [DISTWIDTH] IN BLOOD BY AUTOMATED COUNT: 15.3 % (ref 12.3–15.4)
GLUCOSE BLDC GLUCOMTR-MCNC: 128 MG/DL (ref 70–130)
GLUCOSE BLDC GLUCOMTR-MCNC: 151 MG/DL (ref 70–130)
GLUCOSE BLDC GLUCOMTR-MCNC: 155 MG/DL (ref 70–130)
GLUCOSE BLDC GLUCOMTR-MCNC: 180 MG/DL (ref 70–130)
GLUCOSE BLDC GLUCOMTR-MCNC: 183 MG/DL (ref 70–130)
HCT VFR BLD AUTO: 21.2 % (ref 34–46.6)
HGB BLD-MCNC: 7 G/DL (ref 12–15.9)
LYMPHOCYTES # BLD MANUAL: 0.45 10*3/MM3 (ref 0.7–3.1)
LYMPHOCYTES NFR BLD MANUAL: 1 % (ref 5–12)
LYMPHOCYTES NFR BLD MANUAL: 3 % (ref 19.6–45.3)
MCH RBC QN AUTO: 29 PG (ref 26.6–33)
MCHC RBC AUTO-ENTMCNC: 33 G/DL (ref 31.5–35.7)
MCV RBC AUTO: 88 FL (ref 79–97)
MICROCYTES BLD QL: ABNORMAL
MONOCYTES # BLD AUTO: 0.15 10*3/MM3 (ref 0.1–0.9)
NEUTROPHILS # BLD AUTO: 14.26 10*3/MM3 (ref 1.7–7)
NEUTROPHILS NFR BLD MANUAL: 96 % (ref 42.7–76)
PLAT MORPH BLD: NORMAL
PLATELET # BLD AUTO: 147 10*3/MM3 (ref 140–450)
PMV BLD AUTO: 12.6 FL (ref 6–12)
POLYCHROMASIA BLD QL SMEAR: ABNORMAL
RBC # BLD AUTO: 2.41 10*6/MM3 (ref 3.77–5.28)
WBC # BLD AUTO: 14.85 10*3/MM3 (ref 3.4–10.8)
WBC MORPH BLD: NORMAL

## 2021-07-28 PROCEDURE — 25010000002 ONDANSETRON PER 1 MG: Performed by: OBSTETRICS & GYNECOLOGY

## 2021-07-28 PROCEDURE — 85025 COMPLETE CBC W/AUTO DIFF WBC: CPT | Performed by: OBSTETRICS & GYNECOLOGY

## 2021-07-28 PROCEDURE — 63710000001 INSULIN LISPRO (HUMAN) PER 5 UNITS: Performed by: OBSTETRICS & GYNECOLOGY

## 2021-07-28 PROCEDURE — 25010000002 METOCLOPRAMIDE PER 10 MG: Performed by: OBSTETRICS & GYNECOLOGY

## 2021-07-28 PROCEDURE — 85007 BL SMEAR W/DIFF WBC COUNT: CPT | Performed by: OBSTETRICS & GYNECOLOGY

## 2021-07-28 PROCEDURE — 25010000002 KETOROLAC TROMETHAMINE PER 15 MG: Performed by: OBSTETRICS & GYNECOLOGY

## 2021-07-28 PROCEDURE — 82962 GLUCOSE BLOOD TEST: CPT

## 2021-07-28 RX ORDER — FAMOTIDINE 10 MG/ML
20 INJECTION, SOLUTION INTRAVENOUS EVERY 12 HOURS SCHEDULED
Status: DISCONTINUED | OUTPATIENT
Start: 2021-07-28 | End: 2021-07-31 | Stop reason: HOSPADM

## 2021-07-28 RX ORDER — METOCLOPRAMIDE HYDROCHLORIDE 5 MG/ML
10 INJECTION INTRAMUSCULAR; INTRAVENOUS EVERY 6 HOURS PRN
Status: DISPENSED | OUTPATIENT
Start: 2021-07-28 | End: 2021-07-30

## 2021-07-28 RX ADMIN — INSULIN LISPRO 2 UNITS: 100 INJECTION, SOLUTION INTRAVENOUS; SUBCUTANEOUS at 08:19

## 2021-07-28 RX ADMIN — KETOROLAC TROMETHAMINE 30 MG: 30 INJECTION, SOLUTION INTRAMUSCULAR at 17:04

## 2021-07-28 RX ADMIN — OXYCODONE HYDROCHLORIDE AND ACETAMINOPHEN 2 TABLET: 7.5; 325 TABLET ORAL at 12:46

## 2021-07-28 RX ADMIN — OXYCODONE HYDROCHLORIDE AND ACETAMINOPHEN 1 TABLET: 5; 325 TABLET ORAL at 23:39

## 2021-07-28 RX ADMIN — FAMOTIDINE 20 MG: 10 INJECTION INTRAVENOUS at 08:19

## 2021-07-28 RX ADMIN — IBUPROFEN 600 MG: 600 TABLET, FILM COATED ORAL at 23:39

## 2021-07-28 RX ADMIN — METOCLOPRAMIDE HYDROCHLORIDE 10 MG: 5 INJECTION INTRAMUSCULAR; INTRAVENOUS at 08:19

## 2021-07-28 RX ADMIN — OXYCODONE HYDROCHLORIDE AND ACETAMINOPHEN 2 TABLET: 7.5; 325 TABLET ORAL at 05:27

## 2021-07-28 RX ADMIN — ONDANSETRON 4 MG: 2 INJECTION INTRAMUSCULAR; INTRAVENOUS at 08:20

## 2021-07-28 RX ADMIN — KETOROLAC TROMETHAMINE 30 MG: 30 INJECTION, SOLUTION INTRAMUSCULAR at 08:20

## 2021-07-28 RX ADMIN — INSULIN LISPRO 2 UNITS: 100 INJECTION, SOLUTION INTRAVENOUS; SUBCUTANEOUS at 17:44

## 2021-07-28 NOTE — ANESTHESIA POSTPROCEDURE EVALUATION
"Patient: Sandy Page    Procedure Summary     Date: 21 Room / Location: [facility] LABOR DELIVERY 1 /  [facility] LABOR DELIVERY    Anesthesia Start:  Anesthesia Stop: 21    Procedure:  SECTION PRIMARY (N/A Abdomen) Diagnosis:     Surgeons: Jean-Claude Valentino MD Provider: Sascha Persaud CRNA    Anesthesia Type: epidural ASA Status: 3          Anesthesia Type: epidural    Vitals  Vitals Value Taken Time   /70 218   Temp 98.7 °F (37.1 °C) 21 0038   Pulse 126 218   Resp 18 218   SpO2 98 % 21           Post Anesthesia Care and Evaluation    Patient location during evaluation: bedside  Patient participation: complete - patient participated  Level of consciousness: awake and alert  Pain management: adequate  Airway patency: patent  Anesthetic complications: No anesthetic complications    Cardiovascular status: acceptable  Respiratory status: acceptable  Hydration status: acceptable  Post Neuraxial Block status: Motor and sensory function returned to baseline and No signs or symptoms of PDPH  Comments: Blood pressure 128/70, pulse (!) 126, temperature 98.7 °F (37.1 °C), temperature source Temporal, resp. rate 18, height 162.6 cm (64\"), weight 107 kg (236 lb), SpO2 98 %, not currently breastfeeding.    Pt discharged from PACU based on javier score >8      "

## 2021-07-29 LAB
GLUCOSE BLDC GLUCOMTR-MCNC: 133 MG/DL (ref 70–130)
GLUCOSE BLDC GLUCOMTR-MCNC: 149 MG/DL (ref 70–130)

## 2021-07-29 PROCEDURE — 82962 GLUCOSE BLOOD TEST: CPT

## 2021-07-29 PROCEDURE — 94799 UNLISTED PULMONARY SVC/PX: CPT

## 2021-07-29 RX ADMIN — OXYCODONE AND ACETAMINOPHEN 1 TABLET: 325; 10 TABLET ORAL at 08:10

## 2021-07-29 RX ADMIN — OXYCODONE AND ACETAMINOPHEN 1 TABLET: 325; 10 TABLET ORAL at 18:14

## 2021-07-29 RX ADMIN — IBUPROFEN 600 MG: 600 TABLET, FILM COATED ORAL at 17:11

## 2021-07-29 RX ADMIN — PRENATAL VIT W/ FE FUMARATE-FA TAB 27-0.8 MG 1 TABLET: 27-0.8 TAB at 08:10

## 2021-07-29 RX ADMIN — IBUPROFEN 600 MG: 600 TABLET, FILM COATED ORAL at 08:10

## 2021-07-29 RX ADMIN — METFORMIN HYDROCHLORIDE 500 MG: 500 TABLET ORAL at 08:45

## 2021-07-29 RX ADMIN — OXYCODONE AND ACETAMINOPHEN 1 TABLET: 325; 10 TABLET ORAL at 12:19

## 2021-07-29 RX ADMIN — OXYCODONE HYDROCHLORIDE AND ACETAMINOPHEN 1 TABLET: 5; 325 TABLET ORAL at 22:17

## 2021-07-30 LAB
GLUCOSE BLDC GLUCOMTR-MCNC: 130 MG/DL (ref 70–130)
GLUCOSE BLDC GLUCOMTR-MCNC: 130 MG/DL (ref 70–130)
GLUCOSE BLDC GLUCOMTR-MCNC: 135 MG/DL (ref 70–130)
GLUCOSE BLDC GLUCOMTR-MCNC: 163 MG/DL (ref 70–130)

## 2021-07-30 PROCEDURE — 63710000001 INSULIN LISPRO (HUMAN) PER 5 UNITS: Performed by: OBSTETRICS & GYNECOLOGY

## 2021-07-30 PROCEDURE — 82962 GLUCOSE BLOOD TEST: CPT

## 2021-07-30 RX ADMIN — IBUPROFEN 600 MG: 600 TABLET, FILM COATED ORAL at 07:20

## 2021-07-30 RX ADMIN — OXYCODONE AND ACETAMINOPHEN 1 TABLET: 325; 10 TABLET ORAL at 12:22

## 2021-07-30 RX ADMIN — OXYCODONE AND ACETAMINOPHEN 1 TABLET: 325; 10 TABLET ORAL at 21:03

## 2021-07-30 RX ADMIN — METFORMIN HYDROCHLORIDE 500 MG: 500 TABLET ORAL at 08:17

## 2021-07-30 RX ADMIN — INSULIN LISPRO 2 UNITS: 100 INJECTION, SOLUTION INTRAVENOUS; SUBCUTANEOUS at 08:17

## 2021-07-30 RX ADMIN — IBUPROFEN 600 MG: 600 TABLET, FILM COATED ORAL at 15:25

## 2021-07-30 RX ADMIN — OXYCODONE HYDROCHLORIDE AND ACETAMINOPHEN 1 TABLET: 5; 325 TABLET ORAL at 07:20

## 2021-07-30 RX ADMIN — PRENATAL VIT W/ FE FUMARATE-FA TAB 27-0.8 MG 1 TABLET: 27-0.8 TAB at 08:17

## 2021-07-31 VITALS
OXYGEN SATURATION: 96 % | BODY MASS INDEX: 40.29 KG/M2 | SYSTOLIC BLOOD PRESSURE: 124 MMHG | DIASTOLIC BLOOD PRESSURE: 65 MMHG | RESPIRATION RATE: 16 BRPM | WEIGHT: 236 LBS | TEMPERATURE: 98.2 F | HEIGHT: 64 IN | HEART RATE: 100 BPM

## 2021-07-31 PROBLEM — O13.3 GESTATIONAL HYPERTENSION, THIRD TRIMESTER: Status: RESOLVED | Noted: 2021-07-26 | Resolved: 2021-07-31

## 2021-07-31 PROBLEM — O09.529 AMA (ADVANCED MATERNAL AGE) MULTIGRAVIDA 35+, UNSPECIFIED TRIMESTER: Status: RESOLVED | Noted: 2021-02-24 | Resolved: 2021-07-31

## 2021-07-31 PROBLEM — O99.210 OBESITY AFFECTING PREGNANCY, ANTEPARTUM: Status: RESOLVED | Noted: 2021-03-30 | Resolved: 2021-07-31

## 2021-07-31 PROBLEM — O24.113 PREGNANCY WITH TYPE 2 DIABETES MELLITUS IN THIRD TRIMESTER: Status: RESOLVED | Noted: 2021-06-03 | Resolved: 2021-07-31

## 2021-07-31 LAB — GLUCOSE BLDC GLUCOMTR-MCNC: 156 MG/DL (ref 70–130)

## 2021-07-31 PROCEDURE — 63710000001 INSULIN LISPRO (HUMAN) PER 5 UNITS: Performed by: OBSTETRICS & GYNECOLOGY

## 2021-07-31 PROCEDURE — 82962 GLUCOSE BLOOD TEST: CPT

## 2021-07-31 RX ORDER — OXYCODONE HYDROCHLORIDE AND ACETAMINOPHEN 5; 325 MG/1; MG/1
1 TABLET ORAL EVERY 4 HOURS PRN
Qty: 40 TABLET | Refills: 0 | Status: SHIPPED | OUTPATIENT
Start: 2021-07-31 | End: 2021-08-03

## 2021-07-31 RX ADMIN — PRENATAL VIT W/ FE FUMARATE-FA TAB 27-0.8 MG 1 TABLET: 27-0.8 TAB at 08:21

## 2021-07-31 RX ADMIN — OXYCODONE AND ACETAMINOPHEN 1 TABLET: 325; 10 TABLET ORAL at 03:45

## 2021-07-31 RX ADMIN — OXYCODONE AND ACETAMINOPHEN 1 TABLET: 325; 10 TABLET ORAL at 08:21

## 2021-07-31 RX ADMIN — IBUPROFEN 600 MG: 600 TABLET, FILM COATED ORAL at 03:44

## 2021-07-31 RX ADMIN — INSULIN LISPRO 2 UNITS: 100 INJECTION, SOLUTION INTRAVENOUS; SUBCUTANEOUS at 08:21

## 2021-07-31 RX ADMIN — METFORMIN HYDROCHLORIDE 500 MG: 500 TABLET ORAL at 08:21

## 2021-08-02 NOTE — PAYOR COMM NOTE
"[id number]  [place] [facility]   [id number]    Lupe Dutta (36 y.o. Female)     Date of Birth Social Security Number Address Home Phone MRN    1985  [id number] [address] [phone] [id number]    Mandaen Marital Status          Other Legally        Admission Date Admission Type Admitting Provider Attending Provider Department, Room/Bed    21 Elective Jean-Claude Valentino MD  [facility] MOTHER BABY 2A, M264/1    Discharge Date Discharge Disposition Discharge Destination        2021 Home or Self Care              Attending Provider: (none)   Allergies: No Known Allergies    Isolation: None   Infection: None   Code Status: Prior    Ht: 162.6 cm (64\")   Wt: 107 kg (236 lb)    Admission Cmt: None   Principal Problem: Arrested active phase of labor [O62.1]                 Active Insurance as of 2021     Primary Coverage     Payor Plan Insurance Group Employer/Plan Group    [facility] [id number]     Payor Plan Address Payor Plan Phone Number Payor Plan Fax Number Effective Dates    [address] [address] [phone]  3/1/2021 - None Entered    [facility] [id number]       Subscriber Name Subscriber Birth Date Member ID       LUPE DUTTA 1985 [id number]                 Emergency Contacts      (Rel.) Home Phone Work Phone Mobile Phone    Corrine Doyle (Mother) -- -- [phone]    MARKELL HAYNES (Friend) [phone] -- --               Discharge Summary      Nella Paige MD at 21 0934          Discharge Summary     Laney Dutta  : 1985  MRN: [id number]  CSN: [id number]    Date of Admission: 2021   Date of Discharge:  2021   Delivering Physician: Jean-Claude Valentino MD       Admission Diagnosis: 1. Type 2 diabetes mellitus without complication, without long-term current use of insulin (CMS/LTAC, located within [facility]) [E11.9]   Discharge Diagnosis: 1. Pregnancy at 37w0d - delivered       Procedures: 1. Primary low transverse " -section (LTCS)     Hospital Course  See the completed delivery note for details regarding antepartum course and delivery.    Her post-partum course was unremarkable.  On POD # 4 she felt like she ready for discharge.  She was evaluated by Dr. Nella Paige, who agreed she was ready to be discharged home.  Blood glucose now well-controlled with metformin, which patient already has at home.  BP normal.  She had no febrile morbidity. She had normal bowel and bladder function and was hemodynamically stable.  Her wound was healing well without obvious signs of infections.    Infant  male  fetus weighing 3910 g (8 lb 9.9 oz)   Apgars -  8 @ 1 minute /  8 @ 5 minutes.    Discharge labs  Lab Results   Component Value Date    WBC 14.85 (H) 2021    HGB 7.0 (L) 2021    HCT 21.2 (L) 2021     2021       Discharge Medications     Discharge Medications      New Medications      Instructions Start Date   oxyCODONE-acetaminophen 5-325 MG per tablet  Commonly known as: PERCOCET   1 tablet, Oral, Every 4 Hours PRN         Continue These Medications      Instructions Start Date   citalopram 20 MG tablet  Commonly known as: CeleXA   20 mg, Oral, Daily      glucose blood test strip   Pt to test glucose fasting and then 2 hours after each meal.      glucose monitor monitoring kit   1 each, Does not apply, 4 Times Daily, Pt to test glucose fasting and then 2 hours after each meal.      Lancets misc   1 each, Does not apply, 4 Times Daily, Pt to test glucose fasting and then 2 hours after each meal.      omeprazole 20 MG capsule  Commonly known as: priLOSEC   20 mg, Oral, Daily      prenatal (CLASSIC) vitamin  tablet  Generic drug: prenatal vitamin   1 tablet, Oral, Daily      triamcinolone 0.5 % cream  Commonly known as: KENALOG   Topical, 2 Times Daily         Stop These Medications    HumaLOG KwikPen 100 UNIT/ML solution pen-injector  Generic drug: Insulin Lispro (1 Unit Dial)     Aric Shi  100 UNIT/ML injection pen  Generic drug: Insulin Glargine     metoclopramide 10 MG tablet  Commonly known as: REGLAN            Discharge Disposition Home or Self Care   Condition on Discharge: good   Follow-up: 2 weeks with Tr Paige MD  7/31/2021      Electronically signed by Nella Paige MD at 07/31/21 0976

## 2021-08-11 DIAGNOSIS — F41.9 ANXIETY AND DEPRESSION: ICD-10-CM

## 2021-08-11 DIAGNOSIS — F32.A ANXIETY AND DEPRESSION: ICD-10-CM

## 2021-08-11 RX ORDER — CITALOPRAM 20 MG/1
20 TABLET ORAL DAILY
Qty: 30 TABLET | Refills: 2 | OUTPATIENT
Start: 2021-08-11

## 2021-08-12 ENCOUNTER — TELEPHONE (OUTPATIENT)
Dept: OBSTETRICS AND GYNECOLOGY | Facility: CLINIC | Age: 36
End: 2021-08-12

## 2021-08-12 ENCOUNTER — POSTPARTUM VISIT (OUTPATIENT)
Dept: OBSTETRICS AND GYNECOLOGY | Facility: CLINIC | Age: 36
End: 2021-08-12

## 2021-08-12 VITALS
HEIGHT: 64 IN | DIASTOLIC BLOOD PRESSURE: 86 MMHG | WEIGHT: 196 LBS | BODY MASS INDEX: 33.46 KG/M2 | SYSTOLIC BLOOD PRESSURE: 104 MMHG

## 2021-08-12 DIAGNOSIS — Z09 POSTOP CHECK: Primary | ICD-10-CM

## 2021-08-12 PROCEDURE — 99024 POSTOP FOLLOW-UP VISIT: CPT | Performed by: OBSTETRICS & GYNECOLOGY

## 2021-08-12 NOTE — PROGRESS NOTES
"Sandy Page is a 36 y.o. female here today for incision check after undergoing  on .  She has been doing well since her discharge from the hospital and denies fevers, significant abdominal pain, nausea, or problems with her incision.  Her infant is formula feeding well and she denies postpartum blues. She has not been taking Metformin or checking blood sugars.    /86 (BP Location: Left arm, Patient Position: Sitting)   Ht 162.6 cm (64\")   Wt 88.9 kg (196 lb)   Breastfeeding No   BMI 33.64 kg/m²   In general pleasant female no acute distress  Abdomen soft and nontender  Her surgical taping is removed and her incision is healing well without signs of infection, there is a 1.5cm superficial separation of the skin edges on the right side of the incision    Assessment: Normal incision check after a     She will continue with light activities and pelvic rest. I recommend that she check some blood sugars to see what her glucose reading are doing. She will followup in 4 weeks for a postpartum visit and call in the meantime if she has any questions or concerns.   "

## 2021-08-12 NOTE — TELEPHONE ENCOUNTER
Patient calls after being seen in the office today with c/o of PP depression. I asked the patient if she was asked about this at her visit today and she said no. Patient c/o of crying often and at random times and stays angry a lot of the time. Patient denies suicidal thoughts or thoughts of harming herself or baby. Patient is informed should she have those thoughts she would need to go the [facility] ER. Patient info given to scheduling for appointment tomorrow.

## 2021-08-13 ENCOUNTER — POSTPARTUM VISIT (OUTPATIENT)
Dept: OBSTETRICS AND GYNECOLOGY | Facility: CLINIC | Age: 36
End: 2021-08-13

## 2021-08-13 VITALS
BODY MASS INDEX: 33.12 KG/M2 | SYSTOLIC BLOOD PRESSURE: 114 MMHG | DIASTOLIC BLOOD PRESSURE: 78 MMHG | HEIGHT: 64 IN | WEIGHT: 194 LBS

## 2021-08-13 DIAGNOSIS — F32.A ANXIETY AND DEPRESSION: Primary | ICD-10-CM

## 2021-08-13 DIAGNOSIS — F41.9 ANXIETY AND DEPRESSION: Primary | ICD-10-CM

## 2021-08-13 PROBLEM — O26.86 PUPP (PRURITIC URTICARIAL PAPULES AND PLAQUES OF PREGNANCY): Status: RESOLVED | Noted: 2021-06-24 | Resolved: 2021-08-13

## 2021-08-13 PROBLEM — O28.5 ABNORMAL CHROMOSOMAL AND GENETIC FINDING ON ANTENATAL SCREENING MOTHER: Status: RESOLVED | Noted: 2021-02-24 | Resolved: 2021-08-13

## 2021-08-13 PROCEDURE — 99213 OFFICE O/P EST LOW 20 MIN: CPT | Performed by: OBSTETRICS & GYNECOLOGY

## 2021-08-13 NOTE — PROGRESS NOTES
"Sandy Page is a 36 y.o. female here today for evaluation of her mood.  She feels like she is experiencing worsening depression.  She has a current prescription for Celexa but has not been taking it and does not feel like it ever helped much.  Her mood is waxing and waning but she feels harmon and at times depressed with decreased energy and interest.  She has no thoughts of harming herself or others.    Visit Vitals  /78 (BP Location: Left arm, Patient Position: Sitting, Cuff Size: Adult)   Ht 162.6 cm (64\")   Wt 88 kg (194 lb)   Breastfeeding No   BMI 33.30 kg/m²     Pleasant female no acute distress  Mood and affect normal today  Breathing unlabored    Assessment: Postpartum depression    I have given her a prescription for Zoloft 50 mg daily and we discussed that it may take a couple of weeks for full effect.  She has declined a 2-week follow-up appointment and prefers to return at her scheduled postpartum visit.  We discussed that her symptoms may transiently worsen with initiation of medication, and she will contact the office if she has worsening symptoms.  If she has thoughts of harming herself then she is instructed to go to the emergency room.      "

## 2021-09-01 RX ORDER — BLOOD SUGAR DIAGNOSTIC
STRIP MISCELLANEOUS
OUTPATIENT
Start: 2021-09-01

## 2021-09-10 ENCOUNTER — POSTPARTUM VISIT (OUTPATIENT)
Dept: OBSTETRICS AND GYNECOLOGY | Facility: CLINIC | Age: 36
End: 2021-09-10

## 2021-09-10 VITALS
WEIGHT: 185 LBS | DIASTOLIC BLOOD PRESSURE: 74 MMHG | BODY MASS INDEX: 31.58 KG/M2 | SYSTOLIC BLOOD PRESSURE: 100 MMHG | HEIGHT: 64 IN

## 2021-09-10 DIAGNOSIS — F32.A ANXIETY AND DEPRESSION: ICD-10-CM

## 2021-09-10 DIAGNOSIS — F41.9 ANXIETY AND DEPRESSION: ICD-10-CM

## 2021-09-10 PROBLEM — O09.522 MULTIGRAVIDA OF ADVANCED MATERNAL AGE IN SECOND TRIMESTER: Status: RESOLVED | Noted: 2021-02-09 | Resolved: 2021-09-10

## 2021-09-10 PROCEDURE — 0503F POSTPARTUM CARE VISIT: CPT | Performed by: OBSTETRICS & GYNECOLOGY

## 2021-09-10 RX ORDER — SERTRALINE HYDROCHLORIDE 100 MG/1
100 TABLET, FILM COATED ORAL DAILY
Qty: 30 TABLET | Refills: 3 | Status: SHIPPED | OUTPATIENT
Start: 2021-09-10 | End: 2022-03-01

## 2021-09-10 NOTE — PROGRESS NOTES
"Sandy Page is here for a postpartum visit after a  6 weeks ago.  The depression questionnaire has been completed.  She has signs of postpartum depression today and feels like she would benefit from an increase in the dose of Zoloft.  She has not had a period since her delivery and is formula-feeding her infant without difficulty.  Her last Pap smear was 2021 and ASC-H but HPV negative.  She has no history of cervical dysplasia.  She would like Nexplanon for contraception.    /74 (BP Location: Right arm, Patient Position: Sitting)   Ht 162.6 cm (64\")   Wt 83.9 kg (185 lb)   Breastfeeding No   BMI 31.76 kg/m²    In general pleasant female no acute distress  Neck no thyromegaly  Breasts without erythema tenderness or masses  Abdomen soft and nontender, the incision is well healed  A Pap smear was not performed.     Assessment: Normal postpartum exam. Depression    We have discussed current Pap smear screening guidelines. We will order a device and contact her when it arrives to schedule placement. Sandy will return in 3-4 months for a Pap smear or sooner if needed.  "

## 2021-09-29 ENCOUNTER — PROCEDURE VISIT (OUTPATIENT)
Dept: OBSTETRICS AND GYNECOLOGY | Facility: CLINIC | Age: 36
End: 2021-09-29

## 2021-09-29 VITALS
HEIGHT: 64 IN | WEIGHT: 186 LBS | BODY MASS INDEX: 31.76 KG/M2 | SYSTOLIC BLOOD PRESSURE: 132 MMHG | DIASTOLIC BLOOD PRESSURE: 90 MMHG

## 2021-09-29 DIAGNOSIS — Z30.017 NEXPLANON INSERTION: Primary | ICD-10-CM

## 2021-09-29 PROCEDURE — 81025 URINE PREGNANCY TEST: CPT | Performed by: NURSE PRACTITIONER

## 2021-09-29 PROCEDURE — 11981 INSERTION DRUG DLVR IMPLANT: CPT | Performed by: NURSE PRACTITIONER

## 2021-09-29 RX ORDER — ETONOGESTREL 68 MG/1
IMPLANT SUBCUTANEOUS
COMMUNITY
Start: 2021-09-15 | End: 2022-03-01

## 2021-09-29 NOTE — PATIENT INSTRUCTIONS
"BMI for Adults  What is BMI?  Body mass index (BMI) is a number that is calculated from a person's weight and height. BMI can help estimate how much of a person's weight is composed of fat. BMI does not measure body fat directly. Rather, it is an alternative to procedures that directly measure body fat, which can be difficult and expensive.  BMI can help identify people who may be at higher risk for certain medical problems.  What are BMI measurements used for?  BMI is used as a screening tool to identify possible weight problems. It helps determine whether a person is obese, overweight, a healthy weight, or underweight.  BMI is useful for:  · Identifying a weight problem that may be related to a medical condition or may increase the risk for medical problems.  · Promoting changes, such as changes in diet and exercise, to help reach a healthy weight. BMI screening can be repeated to see if these changes are working.  How is BMI calculated?  BMI involves measuring your weight in relation to your height. Both height and weight are measured, and the BMI is calculated from those numbers. This can be done either in English (U.S.) or metric measurements. Note that charts and online BMI calculators are available to help you find your BMI quickly and easily without having to do these calculations yourself.  To calculate your BMI in English (U.S.) measurements:    1. Measure your weight in pounds (lb).  2. Multiply the number of pounds by 703.  ? For example, for a person who weighs 180 lb, multiply that number by 703, which equals 126,540.  3. Measure your height in inches. Then multiply that number by itself to get a measurement called \"inches squared.\"  ? For example, for a person who is 70 inches tall, the \"inches squared\" measurement is 70 inches x 70 inches, which equals 4,900 inches squared.  4. Divide the total from step 2 (number of lb x 703) by the total from step 3 (inches squared): 126,540 ÷ 4,900 = 25.8. This is " "your BMI.    To calculate your BMI in metric measurements:  1. Measure your weight in kilograms (kg).  2. Measure your height in meters (m). Then multiply that number by itself to get a measurement called \"meters squared.\"  ? For example, for a person who is 1.75 m tall, the \"meters squared\" measurement is 1.75 m x 1.75 m, which is equal to 3.1 meters squared.  3. Divide the number of kilograms (your weight) by the meters squared number. In this example: 70 ÷ 3.1 = 22.6. This is your BMI.  What do the results mean?  BMI charts are used to identify whether you are underweight, normal weight, overweight, or obese. The following guidelines will be used:  · Underweight: BMI less than 18.5.  · Normal weight: BMI between 18.5 and 24.9.  · Overweight: BMI between 25 and 29.9.  · Obese: BMI of 30 or above.  Keep these notes in mind:  · Weight includes both fat and muscle, so someone with a muscular build, such as an athlete, may have a BMI that is higher than 24.9. In cases like these, BMI is not an accurate measure of body fat.  · To determine if excess body fat is the cause of a BMI of 25 or higher, further assessments may need to be done by a health care provider.  · BMI is usually interpreted in the same way for men and women.  Where to find more information  For more information about BMI, including tools to quickly calculate your BMI, go to these websites:  · Centers for Disease Control and Prevention: www.cdc.gov  · American Heart Association: www.heart.org  · National Heart, Lung, and Blood [place]: www.nhlbi.nih.gov  Summary  · Body mass index (BMI) is a number that is calculated from a person's weight and height.  · BMI may help estimate how much of a person's weight is composed of fat. BMI can help identify those who may be at higher risk for certain medical problems.  · BMI can be measured using English measurements or metric measurements.  · BMI charts are used to identify whether you are underweight, normal " weight, overweight, or obese.  This information is not intended to replace advice given to you by your health care provider. Make sure you discuss any questions you have with your health care provider.  Document Revised: 09/09/2020 Document Reviewed: 07/17/2020  Elsevier Patient Education © 2021 Elsevier Inc.

## 2021-09-29 NOTE — PROGRESS NOTES
"Atif Page is a 36 y.o. female    Patient is here for Nexplanon insertion.  UPT is negative.        /90   Ht 162.6 cm (64.02\")   Wt 84.4 kg (186 lb)   LMP 09/15/2021   Breastfeeding No   BMI 31.91 kg/m²     Outpatient Encounter Medications as of 2021   Medication Sig Dispense Refill   • Nexplanon 68 MG implant subdermal implant Inserted 2021     • sertraline (Zoloft) 100 MG tablet Take 1 tablet by mouth Daily. 30 tablet 3     No facility-administered encounter medications on file as of 2021.       Surgical History  Past Surgical History:   Procedure Laterality Date   • BREAST IMPLANT SURGERY Left    •  SECTION N/A 2021    Procedure:  SECTION PRIMARY;  Surgeon: Jean-Claude Valentino MD;  Location: [facility] LABOR DELIVERY;  Service: Obstetrics/Gynecology;  Laterality: N/A;   • CHOLECYSTECTOMY     • CYSTOSCOPY URETEROSCOPY LASER LITHOTRIPSY     • VENTRICULOPERITONEAL SHUNT         Family History  Family History   Problem Relation Age of Onset   • Hypertension Mother    • Diabetes Mother    • No Known Problems Father    • Kidney disease Maternal Aunt    • Bleeding Disorder Maternal Aunt    • Kidney disease Maternal Grandmother    • Kidney disease Maternal Grandfather        The following portions of the patient's history were reviewed and updated as appropriate: allergies, current medications, past family history, past medical history, past social history, past surgical history and problem list.    Review of Systems   Constitutional: Negative for activity change, appetite change, chills, diaphoresis, fatigue, fever, unexpected weight gain and unexpected weight loss.   HENT: Negative for congestion, dental problem, drooling, ear discharge, ear pain, facial swelling, hearing loss, mouth sores, nosebleeds, postnasal drip, rhinorrhea, sinus pressure, sneezing, sore throat, swollen glands, tinnitus, trouble swallowing and voice change.    Eyes: Negative " for blurred vision, double vision, photophobia, pain, discharge, redness, itching and visual disturbance.   Respiratory: Negative for apnea, cough, choking, chest tightness, shortness of breath, wheezing and stridor.    Cardiovascular: Negative for chest pain, palpitations and leg swelling.   Gastrointestinal: Negative for abdominal distention, abdominal pain, anal bleeding, blood in stool, constipation, diarrhea, nausea, rectal pain, vomiting, GERD and indigestion.   Endocrine: Negative for cold intolerance, heat intolerance, polydipsia, polyphagia and polyuria.   Genitourinary: Negative for amenorrhea, breast discharge, breast lump, breast pain, decreased libido, decreased urine volume, difficulty urinating, dyspareunia, dysuria, flank pain, frequency, genital sores, hematuria, menstrual problem, pelvic pain, pelvic pressure, urgency, urinary incontinence, vaginal bleeding, vaginal discharge and vaginal pain.   Musculoskeletal: Negative for arthralgias, back pain, gait problem, joint swelling, myalgias, neck pain, neck stiffness and bursitis.   Skin: Negative for color change, dry skin and rash.   Allergic/Immunologic: Negative for environmental allergies, food allergies and immunocompromised state.   Neurological: Negative for dizziness, tremors, seizures, syncope, facial asymmetry, speech difficulty, weakness, light-headedness, numbness, headache, memory problem and confusion.   Hematological: Negative for adenopathy. Does not bruise/bleed easily.   Psychiatric/Behavioral: Negative for agitation, behavioral problems, decreased concentration, dysphoric mood, hallucinations, self-injury, sleep disturbance, suicidal ideas, negative for hyperactivity, depressed mood and stress. The patient is not nervous/anxious.        Objective   Physical Exam  Vitals and nursing note reviewed.   Constitutional:       Appearance: She is well-developed.   HENT:      Head: Normocephalic and atraumatic.   Eyes:      General:          Right eye: No discharge.         Left eye: No discharge.      Conjunctiva/sclera: Conjunctivae normal.   Neck:      Thyroid: No thyromegaly.   Cardiovascular:      Rate and Rhythm: Normal rate and regular rhythm.      Heart sounds: Normal heart sounds.   Pulmonary:      Effort: Pulmonary effort is normal.      Breath sounds: Normal breath sounds.   Musculoskeletal:      Cervical back: Normal range of motion and neck supple.   Skin:     General: Skin is warm and dry.          Neurological:      Mental Status: She is alert and oriented to person, place, and time.   Psychiatric:         Mood and Affect: Mood normal.         Behavior: Behavior normal.         Thought Content: Thought content normal.         Judgment: Judgment normal.         Assessment/Plan   Diagnoses and all orders for this visit:    1. Nexplanon insertion (Primary)  Comments:  She is here for Nexplanon insertion.  See procedure note.  Orders:  -     POC Pregnancy, Urine         Patient's Body mass index is 31.91 kg/m². indicating that she is obese (BMI >30). Obesity-related health conditions include the following: none. Obesity is unchanged. BMI is is above average; BMI management plan is completed. We discussed portion control and increasing exercise..      Marimar Bailey, APRN  9/29/2021

## 2021-09-29 NOTE — PROGRESS NOTES
Procedure   Procedures    Nexplanon Insertion    Patient's last menstrual period was 09/15/2021.    Date of procedure:  9/29/2021    Risks and benefits discussed? yes  All questions answered? yes  Consents given by the patient  Written consent obtained? yes    Local anesthesia used:  yes -5 cc's of  Meds; anesthesia local: 2% lidocaine    Procedure documentation:    The upper left arm (non-dominant) was marked at the intended site of insertion.  Betadine was used to cleanse the skin.  Local anesthesia was injected.  The Nexplanon was placed subdermally without difficulty.  The devise was able to be palpated in the arm by both myself and Sandy.  Steri-strips were then placed across the site of insertion and the arm was wrapped.    She tolerated the procedure well.  There were no complications.  EBL was minimal.    Post procedure instructions: Remove the wrapping in 24 hours and the steri-strips in 5 days.    Follow up needed: 2 weeks for incision check    NDC #[id number], expiration date 3/28/2024, lot number [id number]    This note was electronically signed.    DEBI Echevarria

## 2022-01-12 ENCOUNTER — TELEPHONE (OUTPATIENT)
Dept: FAMILY MEDICINE CLINIC | Facility: CLINIC | Age: 37
End: 2022-01-12

## 2022-01-12 ENCOUNTER — TELEMEDICINE (OUTPATIENT)
Dept: FAMILY MEDICINE CLINIC | Facility: CLINIC | Age: 37
End: 2022-01-12

## 2022-01-12 ENCOUNTER — LAB (OUTPATIENT)
Dept: LAB | Facility: HOSPITAL | Age: 37
End: 2022-01-12

## 2022-01-12 DIAGNOSIS — J02.9 SORE THROAT: ICD-10-CM

## 2022-01-12 DIAGNOSIS — R05.9 COUGH: ICD-10-CM

## 2022-01-12 DIAGNOSIS — Z20.822 SUSPECTED COVID-19 VIRUS INFECTION: Primary | ICD-10-CM

## 2022-01-12 LAB
FLUAV AG NPH QL: NEGATIVE
FLUBV AG NPH QL IA: NEGATIVE
S PYO AG THROAT QL: NEGATIVE
SARS-COV-2 RNA PNL SPEC NAA+PROBE: NOT DETECTED

## 2022-01-12 PROCEDURE — 87081 CULTURE SCREEN ONLY: CPT

## 2022-01-12 PROCEDURE — 87880 STREP A ASSAY W/OPTIC: CPT

## 2022-01-12 PROCEDURE — 99213 OFFICE O/P EST LOW 20 MIN: CPT | Performed by: NURSE PRACTITIONER

## 2022-01-12 PROCEDURE — 87635 SARS-COV-2 COVID-19 AMP PRB: CPT | Performed by: NURSE PRACTITIONER

## 2022-01-12 PROCEDURE — 87804 INFLUENZA ASSAY W/OPTIC: CPT

## 2022-01-12 NOTE — TELEPHONE ENCOUNTER
----- Message from DEBI Ching sent at 2022  3:17 PM CST -----  Negative covid. Negative flu and strep.    Pt contacted office, verified name and . Informed of the above. Vu of all.

## 2022-01-13 VITALS — WEIGHT: 186 LBS | HEIGHT: 64 IN | BODY MASS INDEX: 31.76 KG/M2

## 2022-01-13 NOTE — PROGRESS NOTES
"You have chosen to receive care through a telehealth visit.  Do you consent to use a video/audio connection for your medical care today? Yes    This was an audio and video enabled telemedicine encounter. Patient verbally consented to visit. Patient was located at Vehicle and I was located at [facility]  location.       Chief Complaint  Cough and Sore Throat    Subjective    History of Present Illness      Patient presents to [facility] for   Pt complains of cough and sore throat that started yesterday.        Review of Systems    I have reviewed and agree with the HPI and ROS information as above.  Andie Charlton Benjy, APRN     Objective   Vital Signs:   Ht 162.6 cm (64.02\")   Wt 84.4 kg (186 lb)   BMI 31.91 kg/m²       Physical Exam  Constitutional:       Appearance: Normal appearance. She is well-developed.   HENT:      Head: Normocephalic and atraumatic.      Nose: No congestion.      Mouth/Throat:      Lips: Pink. No lesions.   Eyes:      General: Lids are normal. Vision grossly intact.      Conjunctiva/sclera: Conjunctivae normal.      Right eye: Right conjunctiva is not injected.      Left eye: Left conjunctiva is not injected.   Pulmonary:      Effort: Pulmonary effort is normal.   Musculoskeletal:         General: Normal range of motion.      Cervical back: Full passive range of motion without pain, normal range of motion and neck supple.   Skin:     General: Skin is warm and dry.   Neurological:      Mental Status: She is alert and oriented to person, place, and time.      Motor: Motor function is intact.   Psychiatric:         Mood and Affect: Mood and affect normal.         Judgment: Judgment normal.          Result Review  Data Reviewed:                   Assessment and Plan      Problem List Items Addressed This Visit     None      Visit Diagnoses     Suspected COVID-19 virus infection    -  Primary    Relevant Orders    COVID PRE-OP / PRE-PROCEDURE " SCREENING ORDER (NO ISOLATION) - Swab, Nasal Cavity (Completed)    Cough        Relevant Orders    Influenza Antigen, Rapid - Swab, Nasopharynx (Completed)    Sore throat        Relevant Orders    Rapid Strep A Screen - Swab, Throat (Completed)      Patient seen today via telehealth complaining of a cough and a sore throat that started yesterday.  She is fully vaccinated.  We will proceed with COVID, flu, and strep swabs.  Will be called with those results.  Patient to call with worsening symptoms.        Follow Up   Return if symptoms worsen or fail to improve.  Patient was given instructions and counseling regarding her condition or for health maintenance advice. Please see specific information pulled into the AVS if appropriate.

## 2022-01-15 LAB — BACTERIA SPEC AEROBE CULT: NORMAL

## 2022-02-02 ENCOUNTER — HOSPITAL ENCOUNTER (EMERGENCY)
Age: 37
Discharge: HOME | End: 2022-02-02
Payer: COMMERCIAL

## 2022-02-02 VITALS
TEMPERATURE: 97.4 F | DIASTOLIC BLOOD PRESSURE: 88 MMHG | WEIGHT: 186 LBS | RESPIRATION RATE: 20 BRPM | SYSTOLIC BLOOD PRESSURE: 121 MMHG | BODY MASS INDEX: 31.76 KG/M2 | HEIGHT: 64 IN | OXYGEN SATURATION: 98 % | HEART RATE: 97 BPM

## 2022-02-02 DIAGNOSIS — K02.9 DENTAL CARIES: Primary | ICD-10-CM

## 2022-02-02 DIAGNOSIS — K04.7 DENTAL INFECTION: ICD-10-CM

## 2022-02-02 PROCEDURE — 99282 EMERGENCY DEPT VISIT SF MDM: CPT

## 2022-02-02 RX ORDER — AMOXICILLIN 500 MG/1
500 CAPSULE ORAL 2 TIMES DAILY
Qty: 20 CAPSULE | Refills: 0 | Status: SHIPPED | OUTPATIENT
Start: 2022-02-02 | End: 2022-02-12

## 2022-02-02 RX ORDER — NAPROXEN 500 MG/1
500 TABLET ORAL 2 TIMES DAILY
Qty: 20 TABLET | Refills: 0 | Status: SHIPPED | OUTPATIENT
Start: 2022-02-02 | End: 2022-02-12

## 2022-02-02 ASSESSMENT — PAIN DESCRIPTION - DESCRIPTORS: DESCRIPTORS: THROBBING

## 2022-02-02 ASSESSMENT — ENCOUNTER SYMPTOMS: FACIAL SWELLING: 1

## 2022-02-02 ASSESSMENT — PAIN SCALES - GENERAL: PAINLEVEL_OUTOF10: 6

## 2022-02-02 ASSESSMENT — PAIN DESCRIPTION - LOCATION: LOCATION: TEETH

## 2022-02-03 NOTE — DISCHARGE INSTR - COC
Continuity of Care Form    Patient Name: Rosa Leung   :  1985  MRN:  [id number]    Admit date:  2022  Discharge date:  ***    Code Status Order: Prior   Advance Directives:      Admitting Physician:  No admitting provider for patient encounter. PCP: Catalina Hardin MD    Discharging Nurse: [facility] Unit/Room#: RME03/RME03  Discharging Unit Phone Number: ***    Emergency Contact:   Extended Emergency Contact Information  Primary Emergency Contact: Marcy Garcia, [address] Phone: [phone]  Relation: Other    Past Surgical History:  Past Surgical History:   Procedure Laterality Date    ABDOMEN SURGERY      BREAST ENHANCEMENT SURGERY      left breast implant    CHOLECYSTECTOMY      COLONOSCOPY      LITHOTRIPSY Right 2016    CYSTOSCOPY RETROGRADE PYELOGRAM LASER LITHOTRIPSY performed by Catarina Pimentel MD at [place] [address] ESWL Right 3/27/2018    ESWL EXTRACORPEAL SHOCK WAVE LITHOTRIPSY performed by Catarina Pimentel MD at [address] [place] [address]    VENTRICULOPERITONEAL SHUNT         Immunization History: There is no immunization history on file for this patient.     Active Problems:  Patient Active Problem List   Diagnosis Code    IBS (irritable bowel syndrome) K58.9    Renal calculus, right N20.0       Isolation/Infection:   Isolation            No Isolation          Patient Infection Status       None to display            Nurse Assessment:  Last Vital Signs: /88   Pulse 97   Temp 97.4 °F (36.3 °C) (Axillary)   Resp 20   Ht 5' 4\" (1.626 m)   Wt 186 lb (84.4 kg)   SpO2 98%   BMI 31.93 kg/m²     Last documented pain score (0-10 scale): Pain Level: 6  Last Weight:   Wt Readings from Last 1 Encounters:   22 186 lb (84.4 kg)     Mental Status:  {IP PT MENTAL STATUS:37278}    IV Access:  {[place] IV ACCESS:603327567}    Nursing Mobility/ADLs:  Walking   {[facility] DIONICIO JZIB:925579796}  Transfer  {[facility] DIONICIO AMUY:658850890}  Bathing  {[facility] DME QCOZ:290761687}  Dressing  {CHP DME FGML:041869424}  Toileting  {CH[facility] DME ITAF:124097884}  Feeding  {CHP DME VJEL:268314376}  Med Admin  {[facility] DME LELR:651586995}  Med Delivery   { SOFIA MED Delivery:994585475}    Wound Care Documentation and Therapy:  Incision 16 Perineum Right (Active)   Number of days:         Elimination:  Continence: Bowel: {YES / LZ:11536}  Bladder: {YES / ZY:17233}  Urinary Catheter: {Urinary Catheter:040128026}   Colostomy/Ileostomy/Ileal Conduit: {YES / NV:}       Date of Last BM: ***  No intake or output data in the 24 hours ending 22  No intake/output data recorded.     Safety Concerns:     [facility] Safety Concerns:817382776}    Impairments/Disabilities:      [facility] Impairments/Disabilities:086892782}    Nutrition Therapy:  Current Nutrition Therapy:   [facility] Diet List:859634091}    Routes of Feeding: {[facility] DME Other Feedings:140296773}  Liquids: {Slp liquid thickness:94800}  Daily Fluid Restriction: {CHP DME Yes amt example:065041586}  Last Modified Barium Swallow with Video (Video Swallowing Test): {Done Not Done XKJQ:425296345}    Treatments at the Time of Hospital Discharge:   Respiratory Treatments: ***  Oxygen Therapy:  {Therapy; copd oxygen:94528}  Ventilator:    { CC Vent WZZB:517895115}    Rehab Therapies: {THERAPEUTIC INTERVENTION:0812863120}  Weight Bearing Status/Restrictions: [employer] Weight [facility]in}  Other Medical Equipment (for information only, NOT a DME order):  {EQUIPMENT:465399779}  Other Treatments: ***    Patient's personal belongings (please select all that are sent with patient):  {P DME Belongings:530777095}    RN SIGNATURE:  {Esignature:718780184}    CASE MANAGEMENT/SOCIAL WORK SECTION    Inpatient Status Date: ***    Readmission Risk Assessment Score:  Readmission Risk              Risk of Unplanned Readmission:  0           Discharging to Facility/ Agency   Name: Address:  Phone:  Fax:    Dialysis Facility (if applicable)   Name:  Address:  Dialysis Schedule:  Phone:  Fax:    / signature: {Esignature:868797857}    PHYSICIAN SECTION    Prognosis: {Prognosis:6740006738}    Condition at Discharge: Jerrica Angeles Patient Condition:164567003}    Rehab Potential (if transferring to Rehab): {Prognosis:1956462021}    Recommended Labs or Other Treatments After Discharge: ***    Physician Certification: I certify the above information and transfer of Nataliia Schuler  is necessary for the continuing treatment of the diagnosis listed and that she requires {Admit to Appropriate Level of Care:49919} for {GREATER/LESS:688247855} 30 days.      Update Admission H&P: {CHP DME Changes in IGCZA:374215496}    PHYSICIAN SIGNATURE:  {Esignature:880523214}

## 2022-02-03 NOTE — ED PROVIDER NOTES
[facility] EMERGENCY DEPT  eMERGENCY dEPARTMENT eNCOUnter      Pt Name: Cherie Santa  MRN: [id number]  [place] 1985  Date of evaluation: 2/2/2022  Provider: Giacomo Canas, [address]       Chief Complaint   Patient presents with    Dental Pain     pt states that pain started today          HISTORY OF PRESENT ILLNESS   (Location/Symptom, Timing/Onset,Context/Setting, Quality, Duration, Modifying Factors, Severity)  Note limiting factors. Cherie Santa is a 39 y.o. female who presents to the emergency department with right lower facial swelling and pain. Pt has teeth broken off at the gums in all her lower molars. No fever or vomiting. HAs not been antibiotics recently                The history is provided by the patient. Dental Pain  Location:  Lower  Quality:  Aching  Severity:  Moderate  Onset quality:  Sudden  Duration:  1 day  Timing:  Constant  Context: dental caries    Associated symptoms: facial swelling    Associated symptoms: no fever    Risk factors: lack of dental care        NursingNotes were reviewed. REVIEW OF SYSTEMS    (2-9 systems for level 4, 10 or more for level 5)     Review of Systems   Constitutional: Negative for fever. HENT: Positive for dental problem and facial swelling. Except as noted above the remainder of the review of systems was reviewed and negative.        PAST MEDICAL HISTORY     Past Medical History:   Diagnosis Date    Fatigue     Fever     GERD (gastroesophageal reflux disease)     Gum disease     Headaches, cluster     Joint ache     Nausea & vomiting     PONV (postoperative nausea and vomiting)     AFTER THE  SHUNT    Pseudotumor cerebri     sees dr. Shoaib Schultz in [facility]    Renal calculus, right 3/4/2018    Weight gain          SURGICALHISTORY       Past Surgical History:   Procedure Laterality Date    ABDOMEN SURGERY      BREAST ENHANCEMENT SURGERY      left breast implant    CHOLECYSTECTOMY      COLONOSCOPY      LITHOTRIPSY Right 7/7/2016    CYSTOSCOPY RETROGRADE PYELOGRAM LASER LITHOTRIPSY performed by Alondra Gilbert MD at [address] ESW[facility] Right 3/27/2018    ESWL [address] LITHOTRIPSY performed by Alondra Gilbert MD at [facility]  2005    VENTRICULOPERITONEAL [place]       Discharge Medication List as of 2/2/2022  9:04 PM      CONTINUE these medications which have NOT CHANGED    Details   memantine (NAMENDA) 5 MG tablet Take 5 mg by mouthHistorical Med      verapamil (VERELAN) 120 MG extended release capsule Take 120 mg by mouth nightly MIGRAINESHistorical Med      ondansetron (ZOFRAN) 8 MG tablet DISSOLVE 1 TABLET IN MOUTH EVERY 8 HOURS AS NEEDED FOR NAUSEA AND VOMITING, R-0Historical Med             ALLERGIES     Patient has no known allergies.     FAMILY HISTORY       Family History   Problem Relation Age of Onset    High Blood Pressure Mother     High Blood Pressure Maternal Grandfather     High Cholesterol Maternal Grandfather     Heart Disease Maternal Grandfather           SOCIAL HISTORY       Social History     Socioeconomic History    Marital status:      Spouse name: Not on file    Number of children: Not on file    Years of education: Not on file    Highest education level: Not on file   Occupational History    Not on file   Tobacco Use    Smoking status: Former Smoker     Packs/day: 0.50     Years: 0.00     Pack years: 0.00     Quit date: 1/1/2012     Years since quitting: 10.0    Smokeless tobacco: Never Used    Tobacco comment: pt vapes   Vaping Use    Vaping Use: Not on file   Substance and Sexual Activity    Alcohol use: No     Alcohol/week: 0.0 standard drinks    Drug use: No    Sexual activity: Not on file   Other Topics Concern    Not on file   Social History Narrative    Not on file     Social Determinants of Health     Financial Resource Strain:     Difficulty of Paying Living Expenses: Not on file   Food Insecurity:     Worried About [address] [employer] in the Last Year: Not on file    Meggan of Food in the Last Year: Not on file   Transportation Needs:     Lack of Transportation (Medical): Not on file    Lack of Transportation (Non-Medical): Not on file   Physical Activity:     Days of Exercise per Week: Not on file    Minutes of Exercise per Session: Not on file   Stress:     Feeling of Stress : Not on file   Social Connections:     Frequency of Communication with Friends and Family: Not on file    Frequency of Social Gatherings with Friends and Family: Not on file    Attends Yarsani Services: Not on file    Active Member of [address] [place] [address] or Organizations: Not on file    Attends Club or Organization Meetings: Not on file    Marital Status: Not on file   Intimate Partner Violence:     Fear of Current or Ex-Partner: Not on file    Emotionally Abused: Not on file    Physically Abused: Not on file    Sexually Abused: Not on file   Housing Stability:     Unable to Pay for Housing in the Last Year: Not on file    Number of [place] in the Last Year: Not on file    Unstable Housing in the Last Year: Not on file       SCREENINGS      @FLOW(67400396)@      PHYSICAL EXAM    (up to 7 for level 4, 8 or more for level 5)     ED Triage Vitals [02/02/22 1931]   BP Temp Temp Source Pulse Resp SpO2 Height Weight   121/88 97.4 °F (36.3 °C) Axillary 97 20 98 % 5' 4\" (1.626 m) 186 lb (84.4 kg)       Physical Exam  Vitals and nursing note reviewed. Constitutional:       Appearance: She is well-developed. HENT:      Head: Normocephalic and atraumatic. Mouth/Throat:      Mouth: Mucous membranes are moist.      Dentition: Abnormal dentition. Dental caries present. Pharynx: Uvula midline. Comments: Molars and bicuspids are all rotted to the gumline on the bottom. Mild gingivial swelling and erythema  Eyes:      General: No scleral icterus. Right eye: No discharge. Left eye: No discharge. Cardiovascular:      Rate and Rhythm: Normal rate. Pulmonary:      Effort: No respiratory distress. Musculoskeletal:      Cervical back: Normal range of motion and neck supple. Neurological:      Mental Status: She is alert. Psychiatric:         Behavior: Behavior normal.         DIAGNOSTIC RESULTS     EKG: All EKG's are interpreted by the Emergency Department Physician who either signs or Co-signsthis chart in the absence of a cardiologist.        RADIOLOGY:   Non-plain filmimages such as CT, Ultrasound and MRI are read by the radiologist. Plain radiographic images are visualized and preliminarily interpreted by the emergency physician with the below findings:      Interpretation per the Radiologist below, if available at the time of this note:    No orders to display         ED BEDSIDEULTRASOUND:   Performed by ED Physician -none    LABS:  Labs Reviewed - No data to display    All other labs were within normal range or not returned as of this dictation. EMERGENCY DEPARTMENT COURSE and DIFFERENTIALDIAGNOSIS/MDM:   Vitals:    Vitals:    02/02/22 1931   BP: 121/88   Pulse: 97   Resp: 20   Temp: 97.4 °F (36.3 °C)   TempSrc: Axillary   SpO2: 98%   Weight: 186 lb (84.4 kg)   Height: 5' 4\" (1.626 m)           MDM patient sees Dr. Kehinde Romero the dentist for says she cannot afford to have her teeth fixed. She has mild facial swelling and pain that started today. We will treat her with antibiotics and NSAIDs have her follow-up with the dentist.  Return precautions given      CONSULTS:  None    PROCEDURES:  Unless otherwise noted below, none     Procedures    FINAL IMPRESSION      1. Dental caries    2. Dental infection        DISPOSITION/PLAN   DISPOSITION        PATIENT REFERRED TO:  No follow-up provider specified.     DISCHARGE MEDICATIONS:  Discharge Medication List as of 2/2/2022  9:04 PM      START taking these medications    Details   amoxicillin (AMOXIL) 500 MG capsule Take 1 capsule by mouth 2 times daily for 10 days, Disp-20 capsule, R-0Normal                (Please note that portions of this note were completed with a voice recognitionprogram.  Efforts were made to edit the dictations but occasionally words are mis-transcribed.)    MARIO Chu (electronically signed)          MARIO Chu  02/02/22 4438       MARIO Chu  02/02/22 7913

## 2022-03-01 ENCOUNTER — PROCEDURE VISIT (OUTPATIENT)
Dept: OBSTETRICS AND GYNECOLOGY | Facility: CLINIC | Age: 37
End: 2022-03-01

## 2022-03-01 VITALS
WEIGHT: 188 LBS | BODY MASS INDEX: 32.1 KG/M2 | DIASTOLIC BLOOD PRESSURE: 82 MMHG | HEIGHT: 64 IN | SYSTOLIC BLOOD PRESSURE: 122 MMHG

## 2022-03-01 DIAGNOSIS — F32.9 REACTIVE DEPRESSION: ICD-10-CM

## 2022-03-01 DIAGNOSIS — Z30.09 ENCOUNTER FOR OTHER GENERAL COUNSELING OR ADVICE ON CONTRACEPTION: ICD-10-CM

## 2022-03-01 DIAGNOSIS — Z30.46 NEXPLANON REMOVAL: Primary | ICD-10-CM

## 2022-03-01 PROCEDURE — 11982 REMOVE DRUG IMPLANT DEVICE: CPT | Performed by: NURSE PRACTITIONER

## 2022-03-01 RX ORDER — ESCITALOPRAM OXALATE 20 MG/1
20 TABLET ORAL DAILY
COMMUNITY
Start: 2022-02-27

## 2022-03-01 RX ORDER — DIAZEPAM 2 MG/1
2 TABLET ORAL 2 TIMES DAILY PRN
COMMUNITY
Start: 2021-12-30 | End: 2022-09-20

## 2022-03-01 NOTE — PROGRESS NOTES
"Atif Page is a 37 y.o. female    Patient comes in today for Nexplanon removal.  The Nexplanon was inserted in September.  She is continued to have bleeding, moodiness, and headaches.  She does not want another form of birth control at this time.        /82   Ht 162.6 cm (64.02\")   Wt 85.3 kg (188 lb)   LMP  (LMP Unknown)   Breastfeeding No   BMI 32.25 kg/m²     Outpatient Encounter Medications as of 3/1/2022   Medication Sig Dispense Refill   • diazePAM (VALIUM) 2 MG tablet Take 2 mg by mouth 2 (Two) Times a Day As Needed.     • escitalopram (LEXAPRO) 20 MG tablet Take 20 mg by mouth Daily.     • [DISCONTINUED] Nexplanon 68 MG implant subdermal implant Inserted 2021     • [DISCONTINUED] sertraline (Zoloft) 100 MG tablet Take 1 tablet by mouth Daily. 30 tablet 3     No facility-administered encounter medications on file as of 3/1/2022.       Surgical History  Past Surgical History:   Procedure Laterality Date   • BREAST IMPLANT SURGERY Left    •  SECTION N/A 2021    Procedure:  SECTION PRIMARY;  Surgeon: Jean-Claude Valentino MD;  Location: [facility] LABOR DELIVERY;  Service: Obstetrics/Gynecology;  Laterality: N/A;   • CHOLECYSTECTOMY     • CYSTOSCOPY URETEROSCOPY LASER LITHOTRIPSY     • VENTRICULOPERITONEAL SHUNT         Family History  Family History   Problem Relation Age of Onset   • Hypertension Mother    • Diabetes Mother    • No Known Problems Father    • Kidney disease Maternal Aunt    • Bleeding Disorder Maternal Aunt    • Kidney disease Maternal Grandmother    • Kidney disease Maternal Grandfather        The following portions of the patient's history were reviewed and updated as appropriate: allergies, current medications, past family history, past medical history, past social history, past surgical history, and problem list.    Review of Systems   Constitutional: Positive for fatigue. Negative for activity change, appetite change, chills, " diaphoresis, fever, unexpected weight gain and unexpected weight loss.   HENT: Negative for congestion, dental problem, drooling, ear discharge, ear pain, facial swelling, hearing loss, mouth sores, nosebleeds, postnasal drip, rhinorrhea, sinus pressure, sneezing, sore throat, swollen glands, tinnitus, trouble swallowing and voice change.    Eyes: Negative for blurred vision, double vision, photophobia, pain, discharge, redness, itching and visual disturbance.   Respiratory: Negative for apnea, cough, choking, chest tightness, shortness of breath, wheezing and stridor.    Cardiovascular: Negative for chest pain, palpitations and leg swelling.   Gastrointestinal: Negative for abdominal distention, abdominal pain, anal bleeding, blood in stool, constipation, diarrhea, nausea, rectal pain, vomiting, GERD and indigestion.   Endocrine: Negative for cold intolerance, heat intolerance, polydipsia, polyphagia and polyuria.   Genitourinary: Positive for menstrual problem. Negative for amenorrhea, breast discharge, breast lump, breast pain, decreased libido, decreased urine volume, difficulty urinating, dyspareunia, dysuria, flank pain, frequency, genital sores, hematuria, pelvic pain, pelvic pressure, urgency, urinary incontinence, vaginal bleeding, vaginal discharge and vaginal pain.   Musculoskeletal: Negative for arthralgias, back pain, gait problem, joint swelling, myalgias, neck pain, neck stiffness and bursitis.   Skin: Negative for color change, dry skin and rash.   Allergic/Immunologic: Negative for environmental allergies, food allergies and immunocompromised state.   Neurological: Positive for headache. Negative for dizziness, tremors, seizures, syncope, facial asymmetry, speech difficulty, weakness, light-headedness, numbness, memory problem and confusion.   Hematological: Negative for adenopathy. Does not bruise/bleed easily.   Psychiatric/Behavioral: Positive for dysphoric mood and depressed mood. Negative for  agitation, behavioral problems, decreased concentration, hallucinations, self-injury, sleep disturbance, suicidal ideas, negative for hyperactivity and stress. The patient is not nervous/anxious.        Objective   Physical Exam  Vitals and nursing note reviewed.   Constitutional:       Appearance: She is well-developed.   HENT:      Head: Normocephalic and atraumatic.   Eyes:      General:         Right eye: No discharge.         Left eye: No discharge.      Conjunctiva/sclera: Conjunctivae normal.   Neck:      Thyroid: No thyromegaly.   Cardiovascular:      Rate and Rhythm: Normal rate and regular rhythm.      Heart sounds: Normal heart sounds.   Pulmonary:      Effort: Pulmonary effort is normal.      Breath sounds: Normal breath sounds.   Musculoskeletal:         General: Normal range of motion.      Cervical back: Normal range of motion and neck supple.   Skin:     General: Skin is warm and dry.          Neurological:      Mental Status: She is alert and oriented to person, place, and time.   Psychiatric:         Mood and Affect: Mood normal.         Behavior: Behavior normal.         Thought Content: Thought content normal.         Judgment: Judgment normal.         Assessment/Plan   Diagnoses and all orders for this visit:    1. Nexplanon removal (Primary)  Comments:  Patient wishes to have her Nexplanon removed.  She had it inserted in September.  She states she has had moodiness, DU B, headaches since it has been in.  See procedure note.    2. Reactive depression  Comments:  Patient had been on Zoloft for depression and it was not helping.  Her PCP started her on Lexapro and she is much improved.    3. Encounter for other general counseling or advice on contraception  Comments:  Patient does not want any further form of birth control.         Patient's Body mass index is 32.25 kg/m². indicating that she is obese (BMI >30). Obesity-related health conditions include the following: none. Obesity is unchanged.  BMI is is above average; BMI management plan is completed. We discussed portion control and increasing exercise..      Marimar Bailey, APRN  3/1/2022

## 2022-03-01 NOTE — PROGRESS NOTES
Procedure   Procedures    Nexplanon Removal    Date of procedure:  3/1/2022    Risks and benefits discussed? yes  All questions answered? yes  Consents given by the patient  Written consent obtained? yes    Local anesthesia used:  yes -5 cc's of  Meds; anesthesia local: None, 2% lidocaine    Procedure documentation:    The upper left arm (non-dominant) was marked at the intended site of removal.  Betadine was used to cleanse the skin.  Local anesthesia was injected.  A vertical incision was created at the distal tip of the implant.  The implant was removed intact without difficulty.  Steri-strips were then placed across the site of insertion and the arm was wrapped.    She tolerated the procedure well.  There were no complications.  EBL was minimal.    Post procedure instructions: Remove the wrapping in 24 hours and the steri-strips in 5 days.    Follow up needed: 2 weeks for incision check.    This note was electronically signed.    DEBI Echevarria

## 2022-03-09 ENCOUNTER — TELEPHONE (OUTPATIENT)
Dept: NEUROSURGERY | Facility: CLINIC | Age: 37
End: 2022-03-09

## 2022-03-09 NOTE — TELEPHONE ENCOUNTER
Sandy with [facility] Orantes office called stating the patient was seen today and Dr. Orantes would like her seen sooner than her appt scheduled on 4/20/22 as she has mild disc edema in both eyes and hypo idiopathic intracranial hypertension. She stated she will fax over todays note and I will scan them into the chart as soon as they come through. Please review for scheduling.

## 2022-03-14 ENCOUNTER — HOSPITAL ENCOUNTER (OUTPATIENT)
Dept: GENERAL RADIOLOGY | Facility: HOSPITAL | Age: 37
Discharge: HOME | End: 2022-03-14
Admitting: NURSE PRACTITIONER

## 2022-03-14 ENCOUNTER — OFFICE VISIT (OUTPATIENT)
Dept: NEUROSURGERY | Facility: CLINIC | Age: 37
End: 2022-03-14

## 2022-03-14 VITALS
SYSTOLIC BLOOD PRESSURE: 118 MMHG | BODY MASS INDEX: 31.72 KG/M2 | WEIGHT: 185.8 LBS | DIASTOLIC BLOOD PRESSURE: 82 MMHG | HEIGHT: 64 IN

## 2022-03-14 DIAGNOSIS — G93.2 BENIGN INTRACRANIAL HYPERTENSION: ICD-10-CM

## 2022-03-14 DIAGNOSIS — E66.09 CLASS 1 OBESITY DUE TO EXCESS CALORIES WITH BODY MASS INDEX (BMI) OF 30.0 TO 30.9 IN ADULT, UNSPECIFIED WHETHER SERIOUS COMORBIDITY PRESENT: ICD-10-CM

## 2022-03-14 DIAGNOSIS — G93.2 PSEUDOTUMOR CEREBRI: ICD-10-CM

## 2022-03-14 DIAGNOSIS — G93.2 PSEUDOTUMOR CEREBRI: Primary | ICD-10-CM

## 2022-03-14 DIAGNOSIS — Z78.9 NON-SMOKER: ICD-10-CM

## 2022-03-14 PROBLEM — E66.811 CLASS 1 OBESITY DUE TO EXCESS CALORIES WITH BODY MASS INDEX (BMI) OF 30.0 TO 30.9 IN ADULT: Status: ACTIVE | Noted: 2022-03-14

## 2022-03-14 PROCEDURE — 70250 X-RAY EXAM OF SKULL: CPT

## 2022-03-14 PROCEDURE — 62252 CSF SHUNT REPROGRAM: CPT | Performed by: NURSE PRACTITIONER

## 2022-03-14 PROCEDURE — 71046 X-RAY EXAM CHEST 2 VIEWS: CPT

## 2022-03-14 PROCEDURE — 99214 OFFICE O/P EST MOD 30 MIN: CPT | Performed by: NURSE PRACTITIONER

## 2022-03-14 PROCEDURE — 74018 RADEX ABDOMEN 1 VIEW: CPT
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
## 2022-03-14 NOTE — PATIENT INSTRUCTIONS

## 2022-03-15 ENCOUNTER — HOSPITAL ENCOUNTER (OUTPATIENT)
Dept: CT IMAGING | Facility: HOSPITAL | Age: 37
Discharge: HOME | End: 2022-03-15
Admitting: NURSE PRACTITIONER

## 2022-03-15 DIAGNOSIS — G93.2 PSEUDOTUMOR CEREBRI: ICD-10-CM

## 2022-03-15 DIAGNOSIS — G93.2 BENIGN INTRACRANIAL HYPERTENSION: ICD-10-CM

## 2022-03-15 PROCEDURE — 70450 CT HEAD/BRAIN W/O DYE: CPT

## 2022-03-17 ENCOUNTER — TELEPHONE (OUTPATIENT)
Dept: NEUROSURGERY | Facility: CLINIC | Age: 37
End: 2022-03-17

## 2022-03-17 NOTE — TELEPHONE ENCOUNTER
Caller: OLU     Relationship: DR. EBONY DEWITT OFFICE     Best call back number: [phone]    What form or medical record are you requesting: OFFICE NOTE 3/14/22 DEBI FARLEY     Who is requesting this form or medical record from you: [facility]     How would you like to receive the form or medical records (pick-up, mail, fax): [phone]    Timeframe paperwork needed: ASAP     Additional notes: OLU WITH [facility] IS CALLING REQUESTING LAST OFFICE NOTE BE FAXED TO THERE OFFICE.     THANK YOU.

## 2022-03-23 ENCOUNTER — OFFICE VISIT (OUTPATIENT)
Dept: NEUROSURGERY | Facility: CLINIC | Age: 37
End: 2022-03-23

## 2022-03-23 VITALS
HEIGHT: 64 IN | SYSTOLIC BLOOD PRESSURE: 118 MMHG | BODY MASS INDEX: 31.58 KG/M2 | WEIGHT: 185 LBS | DIASTOLIC BLOOD PRESSURE: 80 MMHG

## 2022-03-23 DIAGNOSIS — E66.09 CLASS 1 OBESITY DUE TO EXCESS CALORIES WITH BODY MASS INDEX (BMI) OF 30.0 TO 30.9 IN ADULT, UNSPECIFIED WHETHER SERIOUS COMORBIDITY PRESENT: ICD-10-CM

## 2022-03-23 DIAGNOSIS — G93.2 PSEUDOTUMOR CEREBRI: Primary | ICD-10-CM

## 2022-03-23 DIAGNOSIS — G93.2 BENIGN INTRACRANIAL HYPERTENSION: ICD-10-CM

## 2022-03-23 DIAGNOSIS — Z78.9 NON-SMOKER: ICD-10-CM

## 2022-03-23 PROBLEM — E66.811 CLASS 1 OBESITY DUE TO EXCESS CALORIES WITHOUT SERIOUS COMORBIDITY WITH BODY MASS INDEX (BMI) OF 31.0 TO 31.9 IN ADULT: Status: ACTIVE | Noted: 2021-04-20

## 2022-03-23 PROCEDURE — 99213 OFFICE O/P EST LOW 20 MIN: CPT | Performed by: NURSE PRACTITIONER

## 2022-03-23 PROCEDURE — 62252 CSF SHUNT REPROGRAM: CPT | Performed by: NURSE PRACTITIONER

## 2022-03-23 NOTE — PATIENT INSTRUCTIONS
"BMI for Adults  What is BMI?  Body mass index (BMI) is a number that is calculated from a person's weight and height. BMI can help estimate how much of a person's weight is composed of fat. BMI does not measure body fat directly. Rather, it is an alternative to procedures that directly measure body fat, which can be difficult and expensive.  BMI can help identify people who may be at higher risk for certain medical problems.  What are BMI measurements used for?  BMI is used as a screening tool to identify possible weight problems. It helps determine whether a person is obese, overweight, a healthy weight, or underweight.  BMI is useful for:  Identifying a weight problem that may be related to a medical condition or may increase the risk for medical problems.  Promoting changes, such as changes in diet and exercise, to help reach a healthy weight. BMI screening can be repeated to see if these changes are working.  How is BMI calculated?  BMI involves measuring your weight in relation to your height. Both height and weight are measured, and the BMI is calculated from those numbers. This can be done either in English ([place] or metric measurements. Note that charts and online BMI calculators are available to help you find your BMI quickly and easily without having to do these calculations yourself.  To calculate your BMI in English ([place] measurements:    Measure your weight in pounds (lb).  Multiply the number of pounds by 703.  For example, for a person who weighs 180 lb, multiply that number by 703, which equals 126,540.  Measure your height in inches. Then multiply that number by itself to get a measurement called \"inches squared.\"  For example, for a person who is 70 inches tall, the \"inches squared\" measurement is 70 inches x 70 inches, which equals 4,900 inches squared.  Divide the total from step 2 (number of lb x 703) by the total from step 3 (inches squared): 126,540 ÷ 4,900 = 25.8. This is your BMI.    To " "calculate your BMI in metric measurements:  Measure your weight in kilograms (kg).  Measure your height in meters (m). Then multiply that number by itself to get a measurement called \"meters squared.\"  For example, for a person who is 1.75 m tall, the \"meters squared\" measurement is 1.75 m x 1.75 m, which is equal to 3.1 meters squared.  Divide the number of kilograms (your weight) by the meters squared number. In this example: 70 ÷ 3.1 = 22.6. This is your BMI.  What do the results mean?  BMI charts are used to identify whether you are underweight, normal weight, overweight, or obese. The following guidelines will be used:  Underweight: BMI less than 18.5.  Normal weight: BMI between 18.5 and 24.9.  Overweight: BMI between 25 and 29.9.  Obese: BMI of 30 or above.  Keep these notes in mind:  Weight includes both fat and muscle, so someone with a muscular build, such as an athlete, may have a BMI that is higher than 24.9. In cases like these, BMI is not an accurate measure of body fat.  To determine if excess body fat is the cause of a BMI of 25 or higher, further assessments may need to be done by a health care provider.  BMI is usually interpreted in the same way for men and women.  Where to find more information  For more information about BMI, including tools to quickly calculate your BMI, go to these websites:  Centers for Disease Control and Prevention: www.cdc.gov  American Heart Association: www.heart.org  National Heart, Lung, and Blood [place]: www.nhlbi.nih.gov  Summary  Body mass index (BMI) is a number that is calculated from a person's weight and height.  BMI may help estimate how much of a person's weight is composed of fat. BMI can help identify those who may be at higher risk for certain medical problems.  BMI can be measured using English measurements or metric measurements.  BMI charts are used to identify whether you are underweight, normal weight, overweight, or obese.  This information is not " "intended to replace advice given to you by your health care provider. Make sure you discuss any questions you have with your health care provider.  Document Revised: 09/09/2020 Document Reviewed: 07/17/2020  [employer] Patient Education © 2021 [employer]  https://www.nhlbi.nih.gov/files/docs/public/heart/dash_brief.pdf\">   DASH Eating Plan  DASH stands for Dietary Approaches to Stop Hypertension. The DASH eating plan is a healthy eating plan that has been shown to:  Reduce high blood pressure (hypertension).  Reduce your risk for type 2 diabetes, heart disease, and stroke.  Help with weight loss.  What are tips for following this plan?  Reading food labels  Check food labels for the amount of salt (sodium) per serving. Choose foods with less than 5 percent of the Daily Value of sodium. Generally, foods with less than 300 milligrams (mg) of sodium per serving fit into this eating plan.  To find whole grains, look for the word \"whole\" as the first word in the ingredient list.  Shopping  Buy products labeled as \"low-sodium\" or \"no salt added.\"  Buy fresh foods. Avoid canned foods and pre-made or frozen meals.  Cooking  Avoid adding salt when cooking. Use salt-free seasonings or herbs instead of table salt or sea salt. Check with your health care provider or pharmacist before using salt substitutes.  Do not li foods. Cook foods using healthy methods such as baking, boiling, grilling, roasting, and broiling instead.  Cook with heart-healthy oils, such as olive, canola, avocado, soybean, or sunflower oil.  Meal planning    Eat a balanced diet that includes:  4 or more servings of fruits and 4 or more servings of vegetables each day. Try to fill one-half of your plate with fruits and vegetables.  6-8 servings of whole grains each day.  Less than 6 oz (170 g) of lean meat, poultry, or fish each day. A 3-oz (85-g) serving of meat is about the same size as a deck of cards. One egg equals 1 oz (28 g).  2-3 servings of " low-fat dairy each day. One serving is 1 cup (237 mL).  1 serving of nuts, seeds, or beans 5 times each week.  2-3 servings of heart-healthy fats. Healthy fats called omega-3 fatty acids are found in foods such as walnuts, flaxseeds, fortified milks, and eggs. These fats are also found in cold-water fish, such as sardines, salmon, and mackerel.  Limit how much you eat of:  Canned or prepackaged foods.  Food that is high in trans fat, such as some fried foods.  Food that is high in saturated fat, such as fatty meat.  Desserts and other sweets, sugary drinks, and other foods with added sugar.  Full-fat dairy products.  Do not salt foods before eating.  Do not eat more than 4 egg yolks a week.  Try to eat at least 2 vegetarian meals a week.  Eat more home-cooked food and less restaurant, buffet, and fast food.    Lifestyle  When eating at a restaurant, ask that your food be prepared with less salt or no salt, if possible.  If you drink alcohol:  Limit how much you use to:  0-1 drink a day for women who are not pregnant.  0-2 drinks a day for men.  Be aware of how much alcohol is in your drink. In the [place] one drink equals one 12 oz bottle of beer (355 mL), one 5 oz glass of wine (148 mL), or one 1½ oz glass of hard liquor (44 mL).  General information  Avoid eating more than 2,300 mg of salt a day. If you have hypertension, you may need to reduce your sodium intake to 1,500 mg a day.  Work with your health care provider to maintain a healthy body weight or to lose weight. Ask what an ideal weight is for you.  Get at least 30 minutes of exercise that causes your heart to beat faster (aerobic exercise) most days of the week. Activities may include walking, swimming, or biking.  Work with your health care provider or dietitian to adjust your eating plan to your individual calorie needs.  What foods should I eat?  Fruits  All fresh, dried, or frozen fruit. Canned fruit in natural juice (without added  sugar).  Vegetables  Fresh or frozen vegetables (raw, steamed, roasted, or grilled). Low-sodium or reduced-sodium tomato and vegetable juice. Low-sodium or reduced-sodium tomato sauce and tomato paste. Low-sodium or reduced-sodium canned vegetables.  Grains  Whole-grain or whole-wheat bread. Whole-grain or whole-wheat pasta. Brown rice. Oatmeal. Quinoa. Bulgur. Whole-grain and low-sodium cereals. Inna bread. Low-fat, low-sodium crackers. Whole-wheat flour tortillas.  Meats and other proteins  Skinless chicken or turkey. Ground chicken or turkey. Pork with fat trimmed off. Fish and seafood. Egg whites. Dried beans, peas, or lentils. Unsalted nuts, nut butters, and seeds. Unsalted canned beans. Lean cuts of beef with fat trimmed off. Low-sodium, lean precooked or cured meat, such as sausages or meat loaves.  Dairy  Low-fat (1%) or fat-free (skim) milk. Reduced-fat, low-fat, or fat-free cheeses. Nonfat, low-sodium ricotta or cottage cheese. Low-fat or nonfat yogurt. Low-fat, low-sodium cheese.  Fats and oils  Soft margarine without trans fats. Vegetable oil. Reduced-fat, low-fat, or light mayonnaise and salad dressings (reduced-sodium). Canola, safflower, olive, avocado, soybean, and sunflower oils. Avocado.  Seasonings and condiments  Herbs. Spices. Seasoning mixes without salt.  Other foods  Unsalted popcorn and pretzels. Fat-free sweets.  The items listed above may not be a complete list of foods and beverages you can eat. Contact a dietitian for more information.  What foods should I avoid?  Fruits  Canned fruit in a light or heavy syrup. Fried fruit. Fruit in cream or butter sauce.  Vegetables  Creamed or fried vegetables. Vegetables in a cheese sauce. Regular canned vegetables (not low-sodium or reduced-sodium). Regular canned tomato sauce and paste (not low-sodium or reduced-sodium). Regular tomato and vegetable juice (not low-sodium or reduced-sodium). Pickles. Olives.  Grains  Baked goods made with fat, such  as croissants, muffins, or some breads. Dry pasta or rice meal packs.  Meats and other proteins  Fatty cuts of meat. Ribs. Fried meat. [place]. Bologna, salami, and other precooked or cured meats, such as sausages or meat loaves. Fat from the back of a pig (fatback). Bratwurst. Salted nuts and seeds. Canned beans with added salt. Canned or smoked fish. Whole eggs or egg yolks. Chicken or turkey with skin.  Dairy  Whole or 2% milk, cream, and half-and-half. Whole or full-fat cream cheese. Whole-fat or sweetened yogurt. Full-fat cheese. Nondairy creamers. Whipped toppings. Processed cheese and cheese spreads.  Fats and oils  Butter. Stick margarine. Lard. Shortening. Ghee. [place] fat. Tropical oils, such as coconut, palm kernel, or palm oil.  Seasonings and condiments  Onion salt, garlic salt, seasoned salt, table salt, and sea salt. Worcestershire sauce. Tartar sauce. Barbecue sauce. Teriyaki sauce. Soy sauce, including reduced-sodium. Steak sauce. Canned and packaged gravies. Fish sauce. Oyster sauce. Cocktail sauce. Store-bought horseradish. Ketchup. Mustard. Meat flavorings and tenderizers. Bouillon cubes. Hot sauces. Pre-made or packaged marinades. Pre-made or packaged taco seasonings. Relishes. Regular salad dressings.  Other foods  Salted popcorn and pretzels.  The items listed above may not be a complete list of foods and beverages you should avoid. Contact a dietitian for more information.  Where to find more information  National Heart, Lung, and Blood [place]: www.nhlbi.nih.gov  American Heart Association: www.heart.org  Academy of Nutrition and Dietetics: www.eatright.org  National Kidney Foundation: www.kidney.org  Summary  The DASH eating plan is a healthy eating plan that has been shown to reduce high blood pressure (hypertension). It may also reduce your risk for type 2 diabetes, heart disease, and stroke.  When on the DASH eating plan, aim to eat more fresh fruits and vegetables, whole grains, lean  proteins, low-fat dairy, and heart-healthy fats.  With the DASH eating plan, you should limit salt (sodium) intake to 2,300 mg a day. If you have hypertension, you may need to reduce your sodium intake to 1,500 mg a day.  Work with your health care provider or dietitian to adjust your eating plan to your individual calorie needs.  This information is not intended to replace advice given to you by your health care provider. Make sure you discuss any questions you have with your health care provider.  Document Revised: 11/20/2020 Document Reviewed: 11/20/2020  [employer] Patient Education © 2021 [employer]

## 2022-03-23 NOTE — PROGRESS NOTES
"    Chief complaint:   Chief Complaint   Patient presents with   • severe headaches      Sandy is returning for follow up, she states she is miserable with severe headaches since her last visit.  She has had her CT scan and a shunt series done.         Subjective     HPI: This is a 37-year-old female patient who we follow for benign intracranial hypertension.  She had a  shunt placed in 2013.  At her last office appointment she was not complaining of any headaches.  She was having some changes in her vision and her ophthalmologist noted that she had some mild papilledema.  She did state that she had improvement with her vision with reading glasses.  She has been also working with try to get her diabetes under control.  She was not complaining of any nausea or vomiting.  We did send the patient for repeat imaging and she is here in follow-up today.  We did turn the patient's shunt from 1.5 to 0.5.  The patient states that since we made the shunt adjustment that her headaches have gotten worse and are now more of a daily thing that she has been dealing with however she does state that her vision has improved and she is no longer having to use her glasses at this time.  Denies any nausea or vomiting.  She says that she does have an appointment to go see her eye doctor within the next month.    Review of Systems   Gastrointestinal: Negative for nausea.   Musculoskeletal: Negative.    Neurological: Positive for headaches.         Objective      Vital Signs  /80   Ht 162.6 cm (64.02\")   Wt 83.9 kg (185 lb)   LMP  (LMP Unknown)   BMI 31.74 kg/m²     Physical Exam  Constitutional:       Appearance: She is well-developed.   HENT:      Head: Normocephalic.   Eyes:      Extraocular Movements: EOM normal.      Pupils: Pupils are equal, round, and reactive to light.   Pulmonary:      Effort: Pulmonary effort is normal.   Musculoskeletal:         General: Normal range of motion.      Cervical back: Normal range of " motion.   Skin:     General: Skin is warm.   Neurological:      Mental Status: She is alert and oriented to person, place, and time.      GCS: GCS eye subscore is 4. GCS verbal subscore is 5. GCS motor subscore is 6.      Cranial Nerves: No cranial nerve deficit.      Sensory: No sensory deficit.      Gait: Gait is intact. Gait normal.      Deep Tendon Reflexes: Strength normal and reflexes are normal and symmetric.   Psychiatric:         Speech: Speech normal.         Behavior: Behavior normal.         Thought Content: Thought content normal.         Neurologic Exam     Mental Status   Oriented to person, place, and time.   Attention: normal. Concentration: normal.   Speech: speech is normal   Level of consciousness: alert  Normal comprehension.     Cranial Nerves     CN II   Visual fields full to confrontation.     CN III, IV, VI   Pupils are equal, round, and reactive to light.  Extraocular motions are normal.     CN V   Facial sensation intact.     CN VII   Facial expression full, symmetric.     CN VIII   CN VIII normal.     CN IX, X   CN IX normal.   CN X normal.     CN XI   CN XI normal.     CN XII   CN XII normal.     Motor Exam   Muscle bulk: normal    Strength   Strength 5/5 throughout.     Sensory Exam   Light touch normal.     Gait, Coordination, and Reflexes     Gait  Gait: normal    Reflexes   Reflexes 2+ except as noted.       Imaging review: CT scan of the head that was done here at [facility] on March 15, 2022 shows no changes in the ventricles when compared to a CT scan that was done on June 27, 2018                                          3-15-22                                                                            6-27-18      X-ray shunt series shows an intact ventricular shunt catheter system.    Assessment/Plan: The patient has been having increasing headaches but does relate her vision is better.  I am going to adjust the shunt from 0.5-1.0 to see if this will help with her headaches  but also see if it will help maintain the vision improvement that she has had.  She is going to follow-up with her ophthalmologist.  I will see her back in the office in 4 5 weeks.  I am but I had the patient let me know in a few days if her headaches do improve and if her headaches improve we will hold off ordering a nuclear med shunt study but if she continues to have persistent headaches in spite of the shunt adjustment we will get a nuclear med shunt study.  If the patient does continue to have papilledema when she follows up with her ophthalmologist we will also order the nuclear medicine study.    Patient is a nonsmoker  The patient's Body mass index is 31.74 kg/m².. BMI is above normal parameters. Recommendations include: educational material and nutrition counseling    Diagnoses and all orders for this visit:    1. Pseudotumor cerebri (Primary)    2. Benign intracranial hypertension    3. Non-smoker    4. Class 1 obesity due to excess calories with body mass index (BMI) of 30.0 to 30.9 in adult, unspecified whether serious comorbidity present        I discussed the patients findings and my recommendations with patient  Jefferson Hatch, APRN  03/23/22  15:02 CDT

## 2022-05-03 DIAGNOSIS — G93.2 PSEUDOTUMOR CEREBRI: Primary | ICD-10-CM

## 2022-05-03 RX ORDER — BUTALBITAL, ACETAMINOPHEN AND CAFFEINE 50; 325; 40 MG/1; MG/1; MG/1
1 TABLET ORAL EVERY 6 HOURS PRN
Qty: 30 TABLET | Refills: 0 | Status: SHIPPED | OUTPATIENT
Start: 2022-05-03 | End: 2022-09-20

## 2022-05-05 ENCOUNTER — OFFICE VISIT (OUTPATIENT)
Dept: NEUROSURGERY | Facility: CLINIC | Age: 37
End: 2022-05-05

## 2022-05-05 VITALS
WEIGHT: 186 LBS | HEIGHT: 64 IN | SYSTOLIC BLOOD PRESSURE: 112 MMHG | DIASTOLIC BLOOD PRESSURE: 72 MMHG | BODY MASS INDEX: 31.76 KG/M2

## 2022-05-05 DIAGNOSIS — E66.09 CLASS 1 OBESITY DUE TO EXCESS CALORIES WITHOUT SERIOUS COMORBIDITY WITH BODY MASS INDEX (BMI) OF 31.0 TO 31.9 IN ADULT: ICD-10-CM

## 2022-05-05 DIAGNOSIS — G93.2 PSEUDOTUMOR CEREBRI: Primary | ICD-10-CM

## 2022-05-05 DIAGNOSIS — Z78.9 NON-SMOKER: ICD-10-CM

## 2022-05-05 PROCEDURE — 99213 OFFICE O/P EST LOW 20 MIN: CPT | Performed by: NURSE PRACTITIONER

## 2022-05-05 NOTE — PROGRESS NOTES
"    Chief complaint:   Chief Complaint   Patient presents with   • Pseudotumor cerebri     Sandy returns for follow up today, she states she is doing alright and has no new complaints today        Subjective     HPI: This is a 37-year-old female patient who we have been following for benign intracranial hypertension.  The patient did have a shunt placed in 2013.  The patient was seen by her eye doctor and did feel like there was some papilledema present.  We did adjust the patient's shunt and while she did feel like it helped with her vision her headaches did get worse.  We did adjust the shunt to 1.0 at the last office setting.  She has continued to complain of headaches but she did follow-up with her ophthalmologist and did feel like the papilledema was improved.  She comes in today in follow-up.  She says her vision is doing better at this time and she is not having to wear her glasses.  She says she is getting 1-2 headaches a week.  She says that it is a dull ache that is just annoying but she is able to work through it at this time.  I did recheck her shunt and it is still at 1.0.  Denies any nausea or vomiting.  I did send Fioricet into the pharmacy to see if this will help with her headaches and she said that she just picked those up and will be trying this to see if this helps    Review of Systems   Eyes: Negative for visual disturbance.   Musculoskeletal: Negative.    Neurological: Positive for headaches.   Psychiatric/Behavioral: Negative.          Objective      Vital Signs  /72   Ht 162.6 cm (64\")   Wt 84.4 kg (186 lb)   BMI 31.93 kg/m²     Physical Exam  Constitutional:       Appearance: Normal appearance. She is well-developed.   HENT:      Head: Normocephalic.   Eyes:      General: Lids are normal.      Extraocular Movements: EOM normal.      Conjunctiva/sclera: Conjunctivae normal.      Pupils: Pupils are equal, round, and reactive to light.   Cardiovascular:      Rate and Rhythm: Normal " rate and regular rhythm.   Pulmonary:      Effort: Pulmonary effort is normal.      Breath sounds: Normal breath sounds.   Musculoskeletal:         General: Normal range of motion.      Cervical back: Normal range of motion.   Skin:     General: Skin is warm.   Neurological:      Mental Status: She is alert and oriented to person, place, and time.      GCS: GCS eye subscore is 4. GCS verbal subscore is 5. GCS motor subscore is 6.      Cranial Nerves: No cranial nerve deficit.      Sensory: No sensory deficit.      Gait: Gait is intact.      Deep Tendon Reflexes: Strength normal and reflexes are normal and symmetric. Reflexes normal.   Psychiatric:         Speech: Speech normal.         Behavior: Behavior normal.         Thought Content: Thought content normal.         Neurologic Exam     Mental Status   Oriented to person, place, and time.   Attention: normal. Concentration: normal.   Speech: speech is normal   Level of consciousness: alert  Normal comprehension.     Cranial Nerves     CN II   Visual fields full to confrontation.     CN III, IV, VI   Pupils are equal, round, and reactive to light.  Extraocular motions are normal.     CN V   Facial sensation intact.     CN VII   Facial expression full, symmetric.     CN VIII   CN VIII normal.     CN IX, X   CN IX normal.   CN X normal.     CN XI   CN XI normal.     CN XII   CN XII normal.     Motor Exam   Muscle bulk: normal    Strength   Strength 5/5 throughout.     Sensory Exam   Light touch normal.     Gait, Coordination, and Reflexes     Gait  Gait: normal    Reflexes   Reflexes 2+ except as noted.     Eye exam results on April 20, 2022 Dr. Garret Orantes      Imaging review: No new imaging        Assessment/Plan: The patient does want a trial of your senses as will help with her headaches.  I did explain her that this would not be a medication that we use on a long-term basis.  I did offer to increase her shunt setting back to 1.5 however she wants to leave it  alone at this time and see how she does over the next couple months.  Should the medication help and she continued to have headaches we will probably need to send her back to neurology for further evaluation of her headaches.  She was told to call us if any further problems or concerns.  I will see her back in 2 months    Patient is a nonsmoker  The patient's Body mass index is 31.93 kg/m².. BMI is above normal parameters. Recommendations include: educational material and nutrition counseling    Diagnoses and all orders for this visit:    1. Pseudotumor cerebri (Primary)    2. Non-smoker    3. Class 1 obesity due to excess calories without serious comorbidity with body mass index (BMI) of 31.0 to 31.9 in adult        I discussed the patients findings and my recommendations with patient  Jefferson Hatch, DEBI  05/05/22  15:19 CDT

## 2022-05-12 DIAGNOSIS — G93.2 PSEUDOTUMOR CEREBRI: Primary | ICD-10-CM

## 2022-05-12 DIAGNOSIS — R51.9 GENERALIZED HEADACHES: ICD-10-CM

## 2022-05-24 ENCOUNTER — OFFICE VISIT (OUTPATIENT)
Dept: NEUROLOGY | Facility: CLINIC | Age: 37
End: 2022-05-24

## 2022-05-24 VITALS
DIASTOLIC BLOOD PRESSURE: 78 MMHG | WEIGHT: 182 LBS | OXYGEN SATURATION: 99 % | BODY MASS INDEX: 31.07 KG/M2 | HEIGHT: 64 IN | HEART RATE: 91 BPM | SYSTOLIC BLOOD PRESSURE: 116 MMHG | RESPIRATION RATE: 18 BRPM

## 2022-05-24 DIAGNOSIS — G43.719 INTRACTABLE CHRONIC MIGRAINE WITHOUT AURA AND WITHOUT STATUS MIGRAINOSUS: Primary | ICD-10-CM

## 2022-05-24 DIAGNOSIS — G93.2 BENIGN INTRACRANIAL HYPERTENSION: ICD-10-CM

## 2022-05-24 PROCEDURE — 99214 OFFICE O/P EST MOD 30 MIN: CPT | Performed by: NURSE PRACTITIONER

## 2022-05-24 RX ORDER — BUPROPION HYDROCHLORIDE 150 MG/1
150 TABLET ORAL EVERY MORNING
COMMUNITY
Start: 2022-04-26 | End: 2022-09-20

## 2022-05-24 RX ORDER — ATOGEPANT 60 MG/1
60 TABLET ORAL DAILY
Qty: 60 TABLET | Refills: 3 | Status: SHIPPED | OUTPATIENT
Start: 2022-05-24 | End: 2022-09-20

## 2022-05-24 RX ORDER — METFORMIN HYDROCHLORIDE 500 MG/1
500 TABLET, EXTENDED RELEASE ORAL 2 TIMES DAILY
COMMUNITY
Start: 2022-04-26
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
## 2022-05-26 ENCOUNTER — PATIENT ROUNDING (BHMG ONLY) (OUTPATIENT)
Dept: NEUROLOGY | Facility: CLINIC | Age: 37
End: 2022-05-26

## 2022-05-26 NOTE — PROGRESS NOTES
May 26, 2022    Hello, may I speak with Sandy Page?    My name is Trudy SETHI CMA      I am  with [facility]  [address]  [address]  [place] [address] [phone].    Before we get started may I verify your date of birth? 1985    I am calling to officially welcome you to our practice and ask about your recent visit. Is this a good time to talk? yes    Tell me about your visit with us. What things went well?  My scripts got filled on time and Geeta was very nice to my child during the visit.       We're always looking for ways to make our patients' experiences even better. Do you have recommendations on ways we may improve?  no    Overall were you satisfied with your first visit to our practice? yes       I appreciate you taking the time to speak with me today. Is there anything else I can do for you? no      Thank you, and have a great day.

## 2022-06-01 ENCOUNTER — TRANSCRIBE ORDERS (OUTPATIENT)
Dept: ADMINISTRATIVE | Facility: HOSPITAL | Age: 37
End: 2022-06-01

## 2022-06-01 ENCOUNTER — HOSPITAL ENCOUNTER (OUTPATIENT)
Dept: GENERAL RADIOLOGY | Facility: HOSPITAL | Age: 37
Discharge: HOME | End: 2022-06-01
Admitting: FAMILY MEDICINE

## 2022-06-01 DIAGNOSIS — M54.12 CERVICAL RADICULOPATHY: Primary | ICD-10-CM

## 2022-06-01 DIAGNOSIS — M54.12 CERVICAL RADICULOPATHY: ICD-10-CM

## 2022-06-01 PROCEDURE — 72040 X-RAY EXAM NECK SPINE 2-3 VW: CPT

## 2022-06-06 ENCOUNTER — TELEPHONE (OUTPATIENT)
Dept: NEUROLOGY | Facility: CLINIC | Age: 37
End: 2022-06-06

## 2022-06-06 NOTE — TELEPHONE ENCOUNTER
Caller: JULIO     Relationship: [facility]     Best call back number: [phone]    Do you require a callback:YES      JULIO BEARD WITH [facility] CALLING TO FIND OUT RESULTS OF PRIOR  AUTH.     CALL # [phone]

## 2022-06-16 NOTE — TELEPHONE ENCOUNTER
Provider: RAJINDER LAO   Caller: JULIO   Relationship to Patient: [facility]   Phone Number: [phone]  Reason for Call: CALLING TO GET DETERMINATION ON PA FOR KRISTY.

## 2022-06-16 NOTE — TELEPHONE ENCOUNTER
We are continuing to work with them to cover this med. Omid with [facility] is helping with this. ELISSA

## 2022-06-23 NOTE — TELEPHONE ENCOUNTER
Provider: RAJINDER LAO   Caller: JULIO   Relationship to Patient: [facility]   Phone Number: [phone]  Reason for Call: CALLING TO GET DETERMINATION ON PA FOR QUILIPTA.      HE STATES IT LOOKS LIKE THERE IS A DENIAL ON FILE, THEY NEED THE LETTER SO THEY CAN GET THE PT THE MEDICATION

## 2022-06-27 ENCOUNTER — TELEPHONE (OUTPATIENT)
Dept: NEUROLOGY | Facility: CLINIC | Age: 37
End: 2022-06-27

## 2022-08-30 ENCOUNTER — TELEPHONE (OUTPATIENT)
Dept: NEUROLOGY | Facility: CLINIC | Age: 37
End: 2022-08-30

## 2022-08-30 NOTE — TELEPHONE ENCOUNTER
I spoke with Sandy and she confirmed that she quit taking the Ubrelvy and the Qulipta when she found out she is pregnant.  Pt will contact our office to RS at a later date, post pregnancy. She will consult with her OBGYN for headaches and contact us if needed. Pt understood to call anytime and to F/U when she has her baby. ELISSA

## 2022-09-20 ENCOUNTER — INITIAL PRENATAL (OUTPATIENT)
Dept: OBSTETRICS AND GYNECOLOGY | Facility: CLINIC | Age: 37
End: 2022-09-20

## 2022-09-20 VITALS — DIASTOLIC BLOOD PRESSURE: 70 MMHG | WEIGHT: 182 LBS | BODY MASS INDEX: 31.24 KG/M2 | SYSTOLIC BLOOD PRESSURE: 124 MMHG

## 2022-09-20 DIAGNOSIS — F32.A ANXIETY AND DEPRESSION: ICD-10-CM

## 2022-09-20 DIAGNOSIS — Z34.81 PRENATAL CARE, SUBSEQUENT PREGNANCY, FIRST TRIMESTER: Primary | ICD-10-CM

## 2022-09-20 DIAGNOSIS — Z87.891 FORMER SMOKER: ICD-10-CM

## 2022-09-20 DIAGNOSIS — F41.9 ANXIETY AND DEPRESSION: ICD-10-CM

## 2022-09-20 DIAGNOSIS — O09.529 ANTEPARTUM MULTIGRAVIDA OF ADVANCED MATERNAL AGE: ICD-10-CM

## 2022-09-20 DIAGNOSIS — O24.119 PRE-EXISTING TYPE 2 DIABETES MELLITUS DURING PREGNANCY, ANTEPARTUM: ICD-10-CM

## 2022-09-20 PROBLEM — E66.09 CLASS 1 OBESITY DUE TO EXCESS CALORIES WITH BODY MASS INDEX (BMI) OF 30.0 TO 30.9 IN ADULT: Status: RESOLVED | Noted: 2022-03-14 | Resolved: 2022-09-20

## 2022-09-20 PROBLEM — Z34.90 PREGNANCY: Status: ACTIVE | Noted: 2022-09-20

## 2022-09-20 PROBLEM — E66.811 CLASS 1 OBESITY DUE TO EXCESS CALORIES WITH BODY MASS INDEX (BMI) OF 30.0 TO 30.9 IN ADULT: Status: RESOLVED | Noted: 2022-03-14 | Resolved: 2022-09-20

## 2022-09-20 PROBLEM — O99.210 OBESITY IN PREGNANCY, ANTEPARTUM: Status: ACTIVE | Noted: 2022-09-20

## 2022-09-20 PROCEDURE — 0501F PRENATAL FLOW SHEET: CPT | Performed by: ADVANCED PRACTICE MIDWIFE

## 2022-09-20 NOTE — PROGRESS NOTES
37-year old patient arrived to initiate prenatal care.     HPI: 37-year old . Patient's last menstrual period was 2022. Reports she feels fatigue all the time. She also relates that she is concerned about the low heart rate at 6-weeks gestation. She questions the due date as she states she knew she was pregnant prior to when the ultrasound says she was pregnant. She is afraid the fetus is then not growing or developing as it should. Pre-pregnancy weight of 182 pounds.    Previous prenatal history significant for: Post dates  x 1; Term C/S x 1 for arrest of descent; gestational diabetes  History significant for: anxiety, depression, migraines, psudotumor cerebri, ventriculoperitoneal shunt, benign intracranial hypertension, rheumatoid arthritis, breast augmentation, kidney stones    The following portion of the patient's history were reviewed and updated as needed: allergies, current medications, past family history, past medical history, social history, surgical history, and problem list.    ROS: All systems reviewed and are negative with exception of the following: fatigue, breast tenderness    US ordered today, reviewed and shows IUP of 6w1d gestation and Estimated Date of Delivery: 5/15/23  Last Pap Smear: 2021 ASCUS, cannot exclude high grade - Will complete at future appointment.  Colposcopy: normal colposcopy - no biopsies (pregnant)    Exam:  Wt: 182 lb for TWG of 0 kg (0 lb), B/P 124/70, FHTs 104   General Appearance:  healthy-appearing .  HEENT:  NCAT, EOMI, neck supple, no thyroidmegaly.  HR str and reg. No murmur. Lungs clear. Resp even and unlabored.  Abd: Soft, nontender. Bowel sounds active. Uterus is consistent with EGA.  Ext: NT, nontender. No cyanosis or edema.    Diagnoses and all orders for this visit:    1. Prenatal care, subsequent pregnancy, first trimester (Primary)  - Reviewed information in new OB packet, including weight gain in pregnancy, OTC medications for  use during pregnancy, first timester of pregnancy and discomforts, regular OB routine, ffDNA and maternal carrier screening testing.  First Trimester of Pregnancy video included in AVS.  - Advised to maintain regular activity.  - Reviewed and encouraged pt to report vaginal bleeding, pelvic pain or cramping, any prolonged illness or infection, or any other concerns.  - RTO in 2 weeks with an ultrasound for surveillance of fetus and 4 weeks with Dr. Valentino and as needed with concerns  - Discussed and ordered initial prenatal labs today:  -     ABO / Rh  -     Ambulatory Referral to Perinatology  -     Antibody Screen  -     CBC & Differential  -     Chlamydia trachomatis, Neisseria gonorrhoeae, PCR w/ confirmation - Urine, Urine, Clean Catch  -     Hepatitis B Surface Antigen  -     Urine Culture - Urine, Urine, Clean Catch  -     ToxASSURE Select 13 (MW) - Urine, Clean Catch  -     Rubella Antibody, IgG  -     RPR  -     HIV-1 / O / 2 Ag / Antibody 4th Generation  -     Hepatitis C Antibody    2. Pre-existing type 2 diabetes mellitus during pregnancy, antepartum  - Patient's PCP had directed patient to not take Ozempic, along with her migraine medications, with pregnancy. She has not had a dose since 08/01/2022. She has continued with her Metformin as directed. Will plan for Hgb A1c with her prenatal labs.  -     Hemoglobin A1c    3. Antepartum multigravida of advanced maternal age    4. Anxiety and depression  - Managed with Lexapro 20 mg.     5. Former smoker        This note has been signed electronically.    Barbie Cortes, DNP, APRN, CNM, RNC-OB

## 2022-09-21 LAB
ABO GROUP BLD: NORMAL
BASOPHILS # BLD AUTO: 0.05 10*3/MM3 (ref 0–0.2)
BASOPHILS NFR BLD AUTO: 0.4 % (ref 0–1.5)
BLD GP AB SCN SERPL QL: NEGATIVE
EOSINOPHIL # BLD AUTO: 0.19 10*3/MM3 (ref 0–0.4)
EOSINOPHIL NFR BLD AUTO: 1.6 % (ref 0.3–6.2)
ERYTHROCYTE [DISTWIDTH] IN BLOOD BY AUTOMATED COUNT: 15.8 % (ref 12.3–15.4)
HBA1C MFR BLD: 6.7 % (ref 4.8–5.6)
HBV SURFACE AG SERPL QL IA: NEGATIVE
HCT VFR BLD AUTO: 42.1 % (ref 34–46.6)
HCV AB S/CO SERPL IA: <0.1 S/CO RATIO (ref 0–0.9)
HGB BLD-MCNC: 13.9 G/DL (ref 12–15.9)
HIV 1+2 AB+HIV1 P24 AG SERPL QL IA: NON REACTIVE
IMM GRANULOCYTES # BLD AUTO: 0.26 10*3/MM3 (ref 0–0.05)
IMM GRANULOCYTES NFR BLD AUTO: 2.2 % (ref 0–0.5)
LYMPHOCYTES # BLD AUTO: 1.95 10*3/MM3 (ref 0.7–3.1)
LYMPHOCYTES NFR BLD AUTO: 16.6 % (ref 19.6–45.3)
MCH RBC QN AUTO: 30.3 PG (ref 26.6–33)
MCHC RBC AUTO-ENTMCNC: 33 G/DL (ref 31.5–35.7)
MCV RBC AUTO: 91.7 FL (ref 79–97)
MONOCYTES # BLD AUTO: 0.59 10*3/MM3 (ref 0.1–0.9)
MONOCYTES NFR BLD AUTO: 5 % (ref 5–12)
NEUTROPHILS # BLD AUTO: 8.73 10*3/MM3 (ref 1.7–7)
NEUTROPHILS NFR BLD AUTO: 74.2 % (ref 42.7–76)
NRBC BLD AUTO-RTO: 0.1 /100 WBC (ref 0–0.2)
PLATELET # BLD AUTO: 245 10*3/MM3 (ref 140–450)
RBC # BLD AUTO: 4.59 10*6/MM3 (ref 3.77–5.28)
RH BLD: POSITIVE
RPR SER QL: NON REACTIVE
RUBV IGG SERPL IA-ACNC: 1.45 INDEX
WBC # BLD AUTO: 11.77 10*3/MM3 (ref 3.4–10.8)

## 2022-09-22 DIAGNOSIS — O24.119 PRE-EXISTING TYPE 2 DIABETES MELLITUS DURING PREGNANCY, ANTEPARTUM: Primary | ICD-10-CM

## 2022-09-28 ENCOUNTER — HOSPITAL ENCOUNTER (EMERGENCY)
Facility: HOSPITAL | Age: 37
Discharge: HOME | End: 2022-09-29
Admitting: STUDENT IN AN ORGANIZED HEALTH CARE EDUCATION/TRAINING PROGRAM

## 2022-09-28 ENCOUNTER — APPOINTMENT (OUTPATIENT)
Dept: ULTRASOUND IMAGING | Facility: HOSPITAL | Age: 37
End: 2022-09-28

## 2022-09-28 ENCOUNTER — HOSPITAL ENCOUNTER (EMERGENCY)
Age: 37
Discharge: LEFT BEFORE DISCHARGE | End: 2022-09-28

## 2022-09-28 DIAGNOSIS — O46.8X1 SUBCHORIONIC HEMORRHAGE OF PLACENTA IN FIRST TRIMESTER, SINGLE OR UNSPECIFIED FETUS: ICD-10-CM

## 2022-09-28 DIAGNOSIS — N39.0 URINARY TRACT INFECTION WITH HEMATURIA, SITE UNSPECIFIED: ICD-10-CM

## 2022-09-28 DIAGNOSIS — O41.8X10 SUBCHORIONIC HEMORRHAGE OF PLACENTA IN FIRST TRIMESTER, SINGLE OR UNSPECIFIED FETUS: ICD-10-CM

## 2022-09-28 DIAGNOSIS — O20.9 FIRST TRIMESTER BLEEDING: Primary | ICD-10-CM

## 2022-09-28 DIAGNOSIS — R31.9 URINARY TRACT INFECTION WITH HEMATURIA, SITE UNSPECIFIED: ICD-10-CM

## 2022-09-28 LAB
ANION GAP SERPL CALCULATED.3IONS-SCNC: 13 MMOL/L (ref 5–15)
BACTERIA UR QL AUTO: ABNORMAL /HPF
BASOPHILS # BLD AUTO: 0.06 10*3/MM3 (ref 0–0.2)
BASOPHILS NFR BLD AUTO: 0.5 % (ref 0–1.5)
BILIRUB UR QL STRIP: NEGATIVE
BUN SERPL-MCNC: 12 MG/DL (ref 6–20)
BUN/CREAT SERPL: 14.5 (ref 7–25)
CALCIUM SPEC-SCNC: 9.8 MG/DL (ref 8.6–10.5)
CHLORIDE SERPL-SCNC: 101 MMOL/L (ref 98–107)
CLARITY UR: ABNORMAL
CO2 SERPL-SCNC: 24 MMOL/L (ref 22–29)
COLOR UR: YELLOW
CREAT SERPL-MCNC: 0.83 MG/DL (ref 0.57–1)
DEPRECATED RDW RBC AUTO: 48.4 FL (ref 37–54)
EGFRCR SERPLBLD CKD-EPI 2021: 93.3 ML/MIN/1.73
EOSINOPHIL # BLD AUTO: 0.35 10*3/MM3 (ref 0–0.4)
EOSINOPHIL NFR BLD AUTO: 2.7 % (ref 0.3–6.2)
ERYTHROCYTE [DISTWIDTH] IN BLOOD BY AUTOMATED COUNT: 15.6 % (ref 12.3–15.4)
GLUCOSE SERPL-MCNC: 258 MG/DL (ref 65–99)
GLUCOSE UR STRIP-MCNC: ABNORMAL MG/DL
HCG INTACT+B SERPL-ACNC: 7083 MIU/ML
HCT VFR BLD AUTO: 39.1 % (ref 34–46.6)
HGB BLD-MCNC: 13.9 G/DL (ref 12–15.9)
HGB UR QL STRIP.AUTO: ABNORMAL
IMM GRANULOCYTES # BLD AUTO: 0.21 10*3/MM3 (ref 0–0.05)
IMM GRANULOCYTES NFR BLD AUTO: 1.6 % (ref 0–0.5)
INR PPP: 0.91 (ref 0.91–1.09)
KETONES UR QL STRIP: ABNORMAL
LEUKOCYTE ESTERASE UR QL STRIP.AUTO: ABNORMAL
LIPASE SERPL-CCNC: 34 U/L (ref 13–60)
LYMPHOCYTES # BLD AUTO: 2.55 10*3/MM3 (ref 0.7–3.1)
LYMPHOCYTES NFR BLD AUTO: 19.8 % (ref 19.6–45.3)
MCH RBC QN AUTO: 31.1 PG (ref 26.6–33)
MCHC RBC AUTO-ENTMCNC: 35.5 G/DL (ref 31.5–35.7)
MCV RBC AUTO: 87.5 FL (ref 79–97)
MONOCYTES # BLD AUTO: 0.72 10*3/MM3 (ref 0.1–0.9)
MONOCYTES NFR BLD AUTO: 5.6 % (ref 5–12)
NEUTROPHILS NFR BLD AUTO: 69.8 % (ref 42.7–76)
NEUTROPHILS NFR BLD AUTO: 9.01 10*3/MM3 (ref 1.7–7)
NITRITE UR QL STRIP: NEGATIVE
NRBC BLD AUTO-RTO: 0 /100 WBC (ref 0–0.2)
NUMBER OF DOSES: NORMAL
PH UR STRIP.AUTO: 6 [PH] (ref 5–8)
PLATELET # BLD AUTO: 293 10*3/MM3 (ref 140–450)
PMV BLD AUTO: 11.9 FL (ref 6–12)
POTASSIUM SERPL-SCNC: 3.9 MMOL/L (ref 3.5–5.2)
PROT UR QL STRIP: ABNORMAL
PROTHROMBIN TIME: 11.9 SECONDS (ref 11.9–14.6)
RBC # BLD AUTO: 4.47 10*6/MM3 (ref 3.77–5.28)
RBC # UR STRIP: ABNORMAL /HPF
REF LAB TEST METHOD: ABNORMAL
SODIUM SERPL-SCNC: 138 MMOL/L (ref 136–145)
SP GR UR STRIP: 1.03 (ref 1–1.03)
SQUAMOUS #/AREA URNS HPF: ABNORMAL /HPF
STARCH GRANULES URNS QL MICRO: ABNORMAL /HPF
UROBILINOGEN UR QL STRIP: ABNORMAL
WBC # UR STRIP: ABNORMAL /HPF
WBC NRBC COR # BLD: 12.9 10*3/MM3 (ref 3.4–10.8)

## 2022-09-28 PROCEDURE — 86901 BLOOD TYPING SEROLOGIC RH(D): CPT | Performed by: NURSE PRACTITIONER

## 2022-09-28 PROCEDURE — 86900 BLOOD TYPING SEROLOGIC ABO: CPT | Performed by: NURSE PRACTITIONER

## 2022-09-28 PROCEDURE — 84702 CHORIONIC GONADOTROPIN TEST: CPT | Performed by: STUDENT IN AN ORGANIZED HEALTH CARE EDUCATION/TRAINING PROGRAM

## 2022-09-28 PROCEDURE — 36415 COLL VENOUS BLD VENIPUNCTURE: CPT

## 2022-09-28 PROCEDURE — 80048 BASIC METABOLIC PNL TOTAL CA: CPT | Performed by: STUDENT IN AN ORGANIZED HEALTH CARE EDUCATION/TRAINING PROGRAM

## 2022-09-28 PROCEDURE — 76815 OB US LIMITED FETUS(S): CPT

## 2022-09-28 PROCEDURE — 83690 ASSAY OF LIPASE: CPT | Performed by: NURSE PRACTITIONER

## 2022-09-28 PROCEDURE — 85610 PROTHROMBIN TIME: CPT | Performed by: NURSE PRACTITIONER

## 2022-09-28 PROCEDURE — 81001 URINALYSIS AUTO W/SCOPE: CPT | Performed by: STUDENT IN AN ORGANIZED HEALTH CARE EDUCATION/TRAINING PROGRAM

## 2022-09-28 PROCEDURE — 86850 RBC ANTIBODY SCREEN: CPT | Performed by: NURSE PRACTITIONER

## 2022-09-28 PROCEDURE — 85025 COMPLETE CBC W/AUTO DIFF WBC: CPT | Performed by: STUDENT IN AN ORGANIZED HEALTH CARE EDUCATION/TRAINING PROGRAM

## 2022-09-28 PROCEDURE — 99283 EMERGENCY DEPT VISIT LOW MDM: CPT

## 2022-09-28 RX ORDER — SODIUM CHLORIDE 0.9 % (FLUSH) 0.9 %
10 SYRINGE (ML) INJECTION AS NEEDED
Status: DISCONTINUED | OUTPATIENT
Start: 2022-09-28 | End: 2022-09-29 | Stop reason: HOSPADM

## 2022-09-29 ENCOUNTER — TELEPHONE (OUTPATIENT)
Dept: OBSTETRICS AND GYNECOLOGY | Facility: CLINIC | Age: 37
End: 2022-09-29

## 2022-09-29 VITALS
SYSTOLIC BLOOD PRESSURE: 166 MMHG | HEIGHT: 64 IN | HEART RATE: 105 BPM | DIASTOLIC BLOOD PRESSURE: 75 MMHG | BODY MASS INDEX: 32.1 KG/M2 | TEMPERATURE: 98.8 F | RESPIRATION RATE: 20 BRPM | OXYGEN SATURATION: 100 % | WEIGHT: 188 LBS

## 2022-09-29 LAB
ABO GROUP BLD: NORMAL
BLD GP AB SCN SERPL QL: NEGATIVE
RH BLD: POSITIVE

## 2022-09-29 RX ORDER — CEFDINIR 300 MG/1
300 CAPSULE ORAL 2 TIMES DAILY
Qty: 20 CAPSULE | Refills: 0 | Status: SHIPPED | OUTPATIENT
Start: 2022-09-29 | End: 2022-10-04

## 2022-09-29 NOTE — ED NOTES
Attempted to triage patient. Called patient. Pt not in ED lobby at this time.       Frank Urena RN  09/28/22 [id number]

## 2022-09-30 ENCOUNTER — ANESTHESIA (OUTPATIENT)
Dept: PERIOP | Facility: HOSPITAL | Age: 37
End: 2022-09-30

## 2022-09-30 ENCOUNTER — HOSPITAL ENCOUNTER (EMERGENCY)
Facility: HOSPITAL | Age: 37
Discharge: HOME | End: 2022-09-30
Attending: STUDENT IN AN ORGANIZED HEALTH CARE EDUCATION/TRAINING PROGRAM | Admitting: OBSTETRICS & GYNECOLOGY

## 2022-09-30 ENCOUNTER — APPOINTMENT (OUTPATIENT)
Dept: ULTRASOUND IMAGING | Facility: HOSPITAL | Age: 37
End: 2022-09-30

## 2022-09-30 ENCOUNTER — ANESTHESIA EVENT (OUTPATIENT)
Dept: PERIOP | Facility: HOSPITAL | Age: 37
End: 2022-09-30

## 2022-09-30 VITALS
HEART RATE: 104 BPM | DIASTOLIC BLOOD PRESSURE: 58 MMHG | HEIGHT: 64 IN | BODY MASS INDEX: 31.76 KG/M2 | OXYGEN SATURATION: 99 % | TEMPERATURE: 97.6 F | SYSTOLIC BLOOD PRESSURE: 100 MMHG | WEIGHT: 186 LBS | RESPIRATION RATE: 18 BRPM

## 2022-09-30 DIAGNOSIS — O41.8X10 SUBCHORIONIC HEMORRHAGE OF PLACENTA IN FIRST TRIMESTER, SINGLE OR UNSPECIFIED FETUS: ICD-10-CM

## 2022-09-30 DIAGNOSIS — O03.9 MISCARRIAGE: ICD-10-CM

## 2022-09-30 DIAGNOSIS — O46.8X1 SUBCHORIONIC HEMORRHAGE OF PLACENTA IN FIRST TRIMESTER, SINGLE OR UNSPECIFIED FETUS: ICD-10-CM

## 2022-09-30 DIAGNOSIS — O03.4 INCOMPLETE ABORTION: Primary | ICD-10-CM

## 2022-09-30 DIAGNOSIS — O20.9 FIRST TRIMESTER BLEEDING: ICD-10-CM

## 2022-09-30 LAB
ANION GAP SERPL CALCULATED.3IONS-SCNC: 13 MMOL/L (ref 5–15)
ANION GAP SERPL CALCULATED.3IONS-SCNC: 16 MMOL/L (ref 5–15)
BASOPHILS # BLD AUTO: 0.06 10*3/MM3 (ref 0–0.2)
BASOPHILS NFR BLD AUTO: 0.5 % (ref 0–1.5)
BUN SERPL-MCNC: 13 MG/DL (ref 6–20)
BUN SERPL-MCNC: 14 MG/DL (ref 6–20)
BUN/CREAT SERPL: 16.3 (ref 7–25)
BUN/CREAT SERPL: 18.2 (ref 7–25)
CALCIUM SPEC-SCNC: 8.3 MG/DL (ref 8.6–10.5)
CALCIUM SPEC-SCNC: 8.8 MG/DL (ref 8.6–10.5)
CHLORIDE SERPL-SCNC: 100 MMOL/L (ref 98–107)
CHLORIDE SERPL-SCNC: 99 MMOL/L (ref 98–107)
CO2 SERPL-SCNC: 18 MMOL/L (ref 22–29)
CO2 SERPL-SCNC: 19 MMOL/L (ref 22–29)
CREAT SERPL-MCNC: 0.77 MG/DL (ref 0.57–1)
CREAT SERPL-MCNC: 0.8 MG/DL (ref 0.57–1)
DEPRECATED RDW RBC AUTO: 48.8 FL (ref 37–54)
DEPRECATED RDW RBC AUTO: 50.4 FL (ref 37–54)
DEPRECATED RDW RBC AUTO: 51 FL (ref 37–54)
EGFRCR SERPLBLD CKD-EPI 2021: 102 ML/MIN/1.73
EGFRCR SERPLBLD CKD-EPI 2021: 97.5 ML/MIN/1.73
EOSINOPHIL # BLD AUTO: 0.35 10*3/MM3 (ref 0–0.4)
EOSINOPHIL NFR BLD AUTO: 2.9 % (ref 0.3–6.2)
ERYTHROCYTE [DISTWIDTH] IN BLOOD BY AUTOMATED COUNT: 15.3 % (ref 12.3–15.4)
ERYTHROCYTE [DISTWIDTH] IN BLOOD BY AUTOMATED COUNT: 15.7 % (ref 12.3–15.4)
ERYTHROCYTE [DISTWIDTH] IN BLOOD BY AUTOMATED COUNT: 15.9 % (ref 12.3–15.4)
GLUCOSE BLDC GLUCOMTR-MCNC: 233 MG/DL (ref 70–130)
GLUCOSE BLDC GLUCOMTR-MCNC: 296 MG/DL (ref 70–130)
GLUCOSE BLDC GLUCOMTR-MCNC: 412 MG/DL (ref 70–130)
GLUCOSE SERPL-MCNC: 378 MG/DL (ref 65–99)
GLUCOSE SERPL-MCNC: 378 MG/DL (ref 65–99)
GLUCOSE SERPL-MCNC: 456 MG/DL (ref 65–99)
HCG INTACT+B SERPL-ACNC: 5074 MIU/ML
HCT VFR BLD AUTO: 27 % (ref 34–46.6)
HCT VFR BLD AUTO: 29.4 % (ref 34–46.6)
HCT VFR BLD AUTO: 36.3 % (ref 34–46.6)
HGB BLD-MCNC: 10.3 G/DL (ref 12–15.9)
HGB BLD-MCNC: 12.3 G/DL (ref 12–15.9)
HGB BLD-MCNC: 9.4 G/DL (ref 12–15.9)
IMM GRANULOCYTES # BLD AUTO: 0.16 10*3/MM3 (ref 0–0.05)
IMM GRANULOCYTES NFR BLD AUTO: 1.3 % (ref 0–0.5)
LYMPHOCYTES # BLD AUTO: 2.6 10*3/MM3 (ref 0.7–3.1)
LYMPHOCYTES NFR BLD AUTO: 21.3 % (ref 19.6–45.3)
MCH RBC QN AUTO: 30.1 PG (ref 26.6–33)
MCH RBC QN AUTO: 30.8 PG (ref 26.6–33)
MCH RBC QN AUTO: 31 PG (ref 26.6–33)
MCHC RBC AUTO-ENTMCNC: 33.9 G/DL (ref 31.5–35.7)
MCHC RBC AUTO-ENTMCNC: 34.8 G/DL (ref 31.5–35.7)
MCHC RBC AUTO-ENTMCNC: 35 G/DL (ref 31.5–35.7)
MCV RBC AUTO: 88 FL (ref 79–97)
MCV RBC AUTO: 88.8 FL (ref 79–97)
MCV RBC AUTO: 89.1 FL (ref 79–97)
MONOCYTES # BLD AUTO: 0.8 10*3/MM3 (ref 0.1–0.9)
MONOCYTES NFR BLD AUTO: 6.6 % (ref 5–12)
NEUTROPHILS NFR BLD AUTO: 67.4 % (ref 42.7–76)
NEUTROPHILS NFR BLD AUTO: 8.24 10*3/MM3 (ref 1.7–7)
NRBC BLD AUTO-RTO: 0 /100 WBC (ref 0–0.2)
PLATELET # BLD AUTO: 207 10*3/MM3 (ref 140–450)
PLATELET # BLD AUTO: 251 10*3/MM3 (ref 140–450)
PLATELET # BLD AUTO: 277 10*3/MM3 (ref 140–450)
PMV BLD AUTO: 11.8 FL (ref 6–12)
PMV BLD AUTO: 12 FL (ref 6–12)
PMV BLD AUTO: 12 FL (ref 6–12)
POTASSIUM SERPL-SCNC: 4.2 MMOL/L (ref 3.5–5.2)
POTASSIUM SERPL-SCNC: 4.3 MMOL/L (ref 3.5–5.2)
RBC # BLD AUTO: 3.03 10*6/MM3 (ref 3.77–5.28)
RBC # BLD AUTO: 3.34 10*6/MM3 (ref 3.77–5.28)
RBC # BLD AUTO: 4.09 10*6/MM3 (ref 3.77–5.28)
SODIUM SERPL-SCNC: 131 MMOL/L (ref 136–145)
SODIUM SERPL-SCNC: 134 MMOL/L (ref 136–145)
WBC NRBC COR # BLD: 10.82 10*3/MM3 (ref 3.4–10.8)
WBC NRBC COR # BLD: 12.16 10*3/MM3 (ref 3.4–10.8)
WBC NRBC COR # BLD: 12.21 10*3/MM3 (ref 3.4–10.8)

## 2022-09-30 PROCEDURE — 25010000002 ONDANSETRON PER 1 MG

## 2022-09-30 PROCEDURE — 96372 THER/PROPH/DIAG INJ SC/IM: CPT

## 2022-09-30 PROCEDURE — 25010000002 FENTANYL CITRATE (PF) 50 MCG/ML SOLUTION: Performed by: STUDENT IN AN ORGANIZED HEALTH CARE EDUCATION/TRAINING PROGRAM

## 2022-09-30 PROCEDURE — 84702 CHORIONIC GONADOTROPIN TEST: CPT | Performed by: STUDENT IN AN ORGANIZED HEALTH CARE EDUCATION/TRAINING PROGRAM

## 2022-09-30 PROCEDURE — 96374 THER/PROPH/DIAG INJ IV PUSH: CPT

## 2022-09-30 PROCEDURE — 88305 TISSUE EXAM BY PATHOLOGIST: CPT | Performed by: STUDENT IN AN ORGANIZED HEALTH CARE EDUCATION/TRAINING PROGRAM

## 2022-09-30 PROCEDURE — 99284 EMERGENCY DEPT VISIT MOD MDM: CPT

## 2022-09-30 PROCEDURE — 25010000002 PROPOFOL 10 MG/ML EMULSION

## 2022-09-30 PROCEDURE — 96375 TX/PRO/DX INJ NEW DRUG ADDON: CPT

## 2022-09-30 PROCEDURE — 25010000002 LORAZEPAM PER 2 MG

## 2022-09-30 PROCEDURE — 88239 TISSUE CULTURE TUMOR: CPT | Performed by: STUDENT IN AN ORGANIZED HEALTH CARE EDUCATION/TRAINING PROGRAM

## 2022-09-30 PROCEDURE — 80048 BASIC METABOLIC PNL TOTAL CA: CPT | Performed by: STUDENT IN AN ORGANIZED HEALTH CARE EDUCATION/TRAINING PROGRAM

## 2022-09-30 PROCEDURE — 59812 TREATMENT OF MISCARRIAGE: CPT | Performed by: OBSTETRICS & GYNECOLOGY

## 2022-09-30 PROCEDURE — 85025 COMPLETE CBC W/AUTO DIFF WBC: CPT | Performed by: STUDENT IN AN ORGANIZED HEALTH CARE EDUCATION/TRAINING PROGRAM

## 2022-09-30 PROCEDURE — 25010000002 FENTANYL CITRATE (PF) 100 MCG/2ML SOLUTION

## 2022-09-30 PROCEDURE — 85027 COMPLETE CBC AUTOMATED: CPT | Performed by: OBSTETRICS & GYNECOLOGY

## 2022-09-30 PROCEDURE — 88305 TISSUE EXAM BY PATHOLOGIST: CPT | Performed by: OBSTETRICS & GYNECOLOGY

## 2022-09-30 PROCEDURE — 88262 CHROMOSOME ANALYSIS 15-20: CPT | Performed by: STUDENT IN AN ORGANIZED HEALTH CARE EDUCATION/TRAINING PROGRAM

## 2022-09-30 PROCEDURE — 76815 OB US LIMITED FETUS(S): CPT

## 2022-09-30 PROCEDURE — 82962 GLUCOSE BLOOD TEST: CPT

## 2022-09-30 PROCEDURE — 25010000002 METHYLERGONOVINE MALEATE PER 0.2 MG: Performed by: STUDENT IN AN ORGANIZED HEALTH CARE EDUCATION/TRAINING PROGRAM

## 2022-09-30 PROCEDURE — 82947 ASSAY GLUCOSE BLOOD QUANT: CPT | Performed by: STUDENT IN AN ORGANIZED HEALTH CARE EDUCATION/TRAINING PROGRAM

## 2022-09-30 RX ORDER — PROPOFOL 10 MG/ML
VIAL (ML) INTRAVENOUS AS NEEDED
Status: DISCONTINUED | OUTPATIENT
Start: 2022-09-30 | End: 2022-09-30 | Stop reason: SURG

## 2022-09-30 RX ORDER — MIDAZOLAM HYDROCHLORIDE 1 MG/ML
1 INJECTION INTRAMUSCULAR; INTRAVENOUS
Status: DISCONTINUED | OUTPATIENT
Start: 2022-09-30 | End: 2022-09-30 | Stop reason: HOSPADM

## 2022-09-30 RX ORDER — MISOPROSTOL 200 UG/1
TABLET ORAL AS NEEDED
Status: DISCONTINUED | OUTPATIENT
Start: 2022-09-30 | End: 2022-09-30 | Stop reason: HOSPADM

## 2022-09-30 RX ORDER — LIDOCAINE HYDROCHLORIDE 10 MG/ML
0.5 INJECTION, SOLUTION EPIDURAL; INFILTRATION; INTRACAUDAL; PERINEURAL ONCE AS NEEDED
Status: DISCONTINUED | OUTPATIENT
Start: 2022-09-30 | End: 2022-09-30 | Stop reason: HOSPADM

## 2022-09-30 RX ORDER — METHYLERGONOVINE MALEATE 0.2 MG/ML
200 INJECTION INTRAVENOUS ONCE
Status: COMPLETED | OUTPATIENT
Start: 2022-09-30 | End: 2022-09-30

## 2022-09-30 RX ORDER — ONDANSETRON 2 MG/ML
4 INJECTION INTRAMUSCULAR; INTRAVENOUS
Status: DISCONTINUED | OUTPATIENT
Start: 2022-09-30 | End: 2022-09-30 | Stop reason: HOSPADM

## 2022-09-30 RX ORDER — FLUMAZENIL 0.1 MG/ML
0.2 INJECTION INTRAVENOUS AS NEEDED
Status: DISCONTINUED | OUTPATIENT
Start: 2022-09-30 | End: 2022-09-30 | Stop reason: HOSPADM

## 2022-09-30 RX ORDER — NALOXONE HCL 0.4 MG/ML
0.04 VIAL (ML) INJECTION AS NEEDED
Status: DISCONTINUED | OUTPATIENT
Start: 2022-09-30 | End: 2022-09-30 | Stop reason: HOSPADM

## 2022-09-30 RX ORDER — ROCURONIUM BROMIDE 10 MG/ML
INJECTION, SOLUTION INTRAVENOUS AS NEEDED
Status: DISCONTINUED | OUTPATIENT
Start: 2022-09-30 | End: 2022-09-30 | Stop reason: SURG

## 2022-09-30 RX ORDER — FENTANYL CITRATE 50 UG/ML
INJECTION, SOLUTION INTRAMUSCULAR; INTRAVENOUS AS NEEDED
Status: DISCONTINUED | OUTPATIENT
Start: 2022-09-30 | End: 2022-09-30 | Stop reason: SURG

## 2022-09-30 RX ORDER — ONDANSETRON 2 MG/ML
INJECTION INTRAMUSCULAR; INTRAVENOUS AS NEEDED
Status: DISCONTINUED | OUTPATIENT
Start: 2022-09-30 | End: 2022-09-30 | Stop reason: SURG

## 2022-09-30 RX ORDER — SODIUM CHLORIDE 0.9 % (FLUSH) 0.9 %
10 SYRINGE (ML) INJECTION EVERY 12 HOURS SCHEDULED
Status: DISCONTINUED | OUTPATIENT
Start: 2022-09-30 | End: 2022-09-30 | Stop reason: HOSPADM

## 2022-09-30 RX ORDER — DROPERIDOL 2.5 MG/ML
0.62 INJECTION, SOLUTION INTRAMUSCULAR; INTRAVENOUS ONCE AS NEEDED
Status: DISCONTINUED | OUTPATIENT
Start: 2022-09-30 | End: 2022-09-30 | Stop reason: HOSPADM

## 2022-09-30 RX ORDER — SODIUM CHLORIDE, SODIUM LACTATE, POTASSIUM CHLORIDE, CALCIUM CHLORIDE 600; 310; 30; 20 MG/100ML; MG/100ML; MG/100ML; MG/100ML
9 INJECTION, SOLUTION INTRAVENOUS CONTINUOUS
Status: DISCONTINUED | OUTPATIENT
Start: 2022-09-30 | End: 2022-09-30 | Stop reason: HOSPADM

## 2022-09-30 RX ORDER — OXYCODONE HYDROCHLORIDE AND ACETAMINOPHEN 5; 325 MG/1; MG/1
1 TABLET ORAL EVERY 8 HOURS PRN
Qty: 4 TABLET | Refills: 0 | Status: SHIPPED | OUTPATIENT
Start: 2022-09-30 | End: 2022-10-04

## 2022-09-30 RX ORDER — PROMETHAZINE HYDROCHLORIDE 25 MG/1
12.5 TABLET ORAL ONCE AS NEEDED
Status: DISCONTINUED | OUTPATIENT
Start: 2022-09-30 | End: 2022-09-30 | Stop reason: HOSPADM

## 2022-09-30 RX ORDER — BUPIVACAINE HCL/0.9 % NACL/PF 0.125 %
PLASTIC BAG, INJECTION (ML) EPIDURAL AS NEEDED
Status: DISCONTINUED | OUTPATIENT
Start: 2022-09-30 | End: 2022-09-30 | Stop reason: SURG

## 2022-09-30 RX ORDER — OXYCODONE AND ACETAMINOPHEN 10; 325 MG/1; MG/1
1 TABLET ORAL ONCE AS NEEDED
Status: DISCONTINUED | OUTPATIENT
Start: 2022-09-30 | End: 2022-09-30 | Stop reason: HOSPADM

## 2022-09-30 RX ORDER — SODIUM CHLORIDE 0.9 % (FLUSH) 0.9 %
3 SYRINGE (ML) INJECTION AS NEEDED
Status: DISCONTINUED | OUTPATIENT
Start: 2022-09-30 | End: 2022-09-30 | Stop reason: HOSPADM

## 2022-09-30 RX ORDER — FENTANYL CITRATE 50 UG/ML
25 INJECTION, SOLUTION INTRAMUSCULAR; INTRAVENOUS
Status: DISCONTINUED | OUTPATIENT
Start: 2022-09-30 | End: 2022-09-30 | Stop reason: HOSPADM

## 2022-09-30 RX ORDER — DEXTROSE MONOHYDRATE 25 G/50ML
12.5 INJECTION, SOLUTION INTRAVENOUS AS NEEDED
Status: DISCONTINUED | OUTPATIENT
Start: 2022-09-30 | End: 2022-09-30 | Stop reason: HOSPADM

## 2022-09-30 RX ORDER — SUCCINYLCHOLINE/SOD CL,ISO/PF 200MG/10ML
SYRINGE (ML) INTRAVENOUS AS NEEDED
Status: DISCONTINUED | OUTPATIENT
Start: 2022-09-30 | End: 2022-09-30 | Stop reason: SURG

## 2022-09-30 RX ORDER — LIDOCAINE HYDROCHLORIDE 20 MG/ML
INJECTION, SOLUTION EPIDURAL; INFILTRATION; INTRACAUDAL; PERINEURAL AS NEEDED
Status: DISCONTINUED | OUTPATIENT
Start: 2022-09-30 | End: 2022-09-30 | Stop reason: SURG

## 2022-09-30 RX ORDER — SODIUM CHLORIDE 0.9 % (FLUSH) 0.9 %
10 SYRINGE (ML) INJECTION AS NEEDED
Status: DISCONTINUED | OUTPATIENT
Start: 2022-09-30 | End: 2022-09-30 | Stop reason: HOSPADM

## 2022-09-30 RX ORDER — LABETALOL HYDROCHLORIDE 5 MG/ML
5 INJECTION, SOLUTION INTRAVENOUS
Status: DISCONTINUED | OUTPATIENT
Start: 2022-09-30 | End: 2022-09-30 | Stop reason: HOSPADM

## 2022-09-30 RX ORDER — LORAZEPAM 2 MG/ML
2 INJECTION INTRAMUSCULAR ONCE
Status: COMPLETED | OUTPATIENT
Start: 2022-09-30 | End: 2022-09-30

## 2022-09-30 RX ORDER — LORAZEPAM 2 MG/ML
INJECTION INTRAMUSCULAR
Status: COMPLETED
Start: 2022-09-30 | End: 2022-09-30

## 2022-09-30 RX ORDER — FENTANYL CITRATE 50 UG/ML
25 INJECTION, SOLUTION INTRAMUSCULAR; INTRAVENOUS ONCE
Status: COMPLETED | OUTPATIENT
Start: 2022-09-30 | End: 2022-09-30

## 2022-09-30 RX ORDER — HYDROMORPHONE HYDROCHLORIDE 1 MG/ML
0.5 INJECTION, SOLUTION INTRAMUSCULAR; INTRAVENOUS; SUBCUTANEOUS
Status: DISCONTINUED | OUTPATIENT
Start: 2022-09-30 | End: 2022-09-30 | Stop reason: HOSPADM

## 2022-09-30 RX ORDER — SODIUM CHLORIDE, SODIUM LACTATE, POTASSIUM CHLORIDE, CALCIUM CHLORIDE 600; 310; 30; 20 MG/100ML; MG/100ML; MG/100ML; MG/100ML
1000 INJECTION, SOLUTION INTRAVENOUS CONTINUOUS
Status: DISCONTINUED | OUTPATIENT
Start: 2022-09-30 | End: 2022-09-30 | Stop reason: HOSPADM

## 2022-09-30 RX ORDER — SODIUM CHLORIDE, SODIUM LACTATE, POTASSIUM CHLORIDE, CALCIUM CHLORIDE 600; 310; 30; 20 MG/100ML; MG/100ML; MG/100ML; MG/100ML
INJECTION, SOLUTION INTRAVENOUS CONTINUOUS PRN
Status: DISCONTINUED | OUTPATIENT
Start: 2022-09-30 | End: 2022-09-30 | Stop reason: SURG

## 2022-09-30 RX ORDER — IBUPROFEN 600 MG/1
600 TABLET ORAL EVERY 6 HOURS PRN
Status: DISCONTINUED | OUTPATIENT
Start: 2022-09-30 | End: 2022-09-30 | Stop reason: HOSPADM

## 2022-09-30 RX ORDER — IBUPROFEN 600 MG/1
600 TABLET ORAL ONCE AS NEEDED
Status: DISCONTINUED | OUTPATIENT
Start: 2022-09-30 | End: 2022-09-30 | Stop reason: HOSPADM

## 2022-09-30 RX ADMIN — SODIUM CHLORIDE, POTASSIUM CHLORIDE, SODIUM LACTATE AND CALCIUM CHLORIDE: 600; 310; 30; 20 INJECTION, SOLUTION INTRAVENOUS at 10:37

## 2022-09-30 RX ADMIN — FENTANYL CITRATE 25 MCG: 50 INJECTION INTRAMUSCULAR; INTRAVENOUS at 09:07

## 2022-09-30 RX ADMIN — PROPOFOL 150 MG: 10 INJECTION, EMULSION INTRAVENOUS at 10:21

## 2022-09-30 RX ADMIN — LORAZEPAM 1 MG: 2 INJECTION INTRAMUSCULAR; INTRAVENOUS at 06:05

## 2022-09-30 RX ADMIN — SODIUM CHLORIDE, POTASSIUM CHLORIDE, SODIUM LACTATE AND CALCIUM CHLORIDE 1000 ML: 600; 310; 30; 20 INJECTION, SOLUTION INTRAVENOUS at 07:11

## 2022-09-30 RX ADMIN — Medication 100 MG: at 10:21

## 2022-09-30 RX ADMIN — SODIUM CHLORIDE, POTASSIUM CHLORIDE, SODIUM LACTATE AND CALCIUM CHLORIDE 1000 ML: 600; 310; 30; 20 INJECTION, SOLUTION INTRAVENOUS at 05:20

## 2022-09-30 RX ADMIN — Medication 100 MCG: at 10:28

## 2022-09-30 RX ADMIN — METHYLERGONOVINE MALEATE 200 MCG: 0.2 INJECTION INTRAVENOUS at 07:11

## 2022-09-30 RX ADMIN — ROCURONIUM BROMIDE 5 MG: 10 SOLUTION INTRAVENOUS at 10:21

## 2022-09-30 RX ADMIN — LORAZEPAM 1 MG: 2 INJECTION INTRAMUSCULAR at 06:05

## 2022-09-30 RX ADMIN — SODIUM CHLORIDE, POTASSIUM CHLORIDE, SODIUM LACTATE AND CALCIUM CHLORIDE 1000 ML: 600; 310; 30; 20 INJECTION, SOLUTION INTRAVENOUS at 10:09

## 2022-09-30 RX ADMIN — Medication 100 MCG: at 10:46

## 2022-09-30 RX ADMIN — LIDOCAINE HYDROCHLORIDE 80 MG: 20 INJECTION, SOLUTION EPIDURAL; INFILTRATION; INTRACAUDAL; PERINEURAL at 10:21

## 2022-09-30 RX ADMIN — ONDANSETRON 4 MG: 2 INJECTION INTRAMUSCULAR; INTRAVENOUS at 10:47

## 2022-09-30 RX ADMIN — FENTANYL CITRATE 100 MCG: 50 INJECTION INTRAMUSCULAR; INTRAVENOUS at 10:21

## 2022-09-30 NOTE — ANESTHESIA PREPROCEDURE EVALUATION
Anesthesia Evaluation     Patient summary reviewed   history of anesthetic complications: PONV  NPO Solid Status: > 8 hours             Airway   Mallampati: II  TM distance: >3 FB  Neck ROM: full  Dental    (+) poor dentition    Pulmonary    (-) COPD, asthma, sleep apnea, not a smoker  Cardiovascular   Exercise tolerance: excellent (>7 METS)    (-) pacemaker, past MI, angina, cardiac stents      Neuro/Psych  (+) headaches (pseudotumor s/p VPS),    (-) seizures, TIA, CVA  GI/Hepatic/Renal/Endo    (+) obesity,   diabetes mellitus,   (-) GERD, liver disease, no renal disease    Musculoskeletal     Abdominal    Substance History      OB/GYN    (+) Pregnant,         Other                        Anesthesia Plan    ASA 2 - emergent     general     intravenous induction     Anesthetic plan, risks, benefits, and alternatives have been provided, discussed and informed consent has been obtained with: patient.        CODE STATUS:

## 2022-09-30 NOTE — ANESTHESIA POSTPROCEDURE EVALUATION
"Patient: Sandy Page    Procedure Summary     Date: 22 Room / Location: [facility] OR  /  [facility] OR    Anesthesia Start: 1018 Anesthesia Stop: 1107    Procedure: DILATATION AND CURETTAGE WITH SUCTION (N/A Vagina) Diagnosis:       Incomplete       (Incomplete  [O03.4])    Surgeons: Clarita Davis DO Provider: Marc Ruiz CRNA    Anesthesia Type: general ASA Status: 2 - Emergent          Anesthesia Type: general    Vitals  Vitals Value Taken Time   BP 94/56 22 1141   Temp 97.6 °F (36.4 °C) 22 1140   Pulse 102 22 1144   Resp 16 22 1140   SpO2 95 % 22 1144   Vitals shown include unvalidated device data.        Post Anesthesia Care and Evaluation    PONV Status: none  Comments: Patient d/c from PACU prior to anes eval based on Francia score.  Please see RN notes for details of d/c criteria.    Blood pressure 97/66, pulse 103, temperature 97.6 °F (36.4 °C), temperature source Temporal, resp. rate 18, height 162.6 cm (64\"), weight 84.4 kg (186 lb), last menstrual period 2022, SpO2 99 %, not currently breastfeeding.          "

## 2022-09-30 NOTE — ANESTHESIA PROCEDURE NOTES
Airway  Urgency: elective    Date/Time: 9/30/2022 10:22 AM  Airway not difficult    General Information and Staff    Patient location during procedure: OR  CRNA/CAA: Marc Ruiz CRNA    Indications and Patient Condition  Indications for airway management: airway protection    Preoxygenated: yes  Mask difficulty assessment: 1 - vent by mask    Final Airway Details  Final airway type: endotracheal airway      Successful airway: ETT  Cuffed: yes   Successful intubation technique: direct laryngoscopy  Facilitating devices/methods: intubating stylet  Blade: Quikc  Blade size: 2  ETT size (mm): 7.0  Cormack-Lehane Classification: grade I - full view of glottis  Placement verified by: chest auscultation and capnometry   Measured from: lips  ETT/EBT  to lips (cm): 21  Number of attempts at approach: 1  Assessment: lips, teeth, and gum same as pre-op and atraumatic intubation

## 2022-10-03 LAB
CYTO UR: NORMAL
LAB AP CASE REPORT: NORMAL
Lab: NORMAL
PATH REPORT.FINAL DX SPEC: NORMAL
PATH REPORT.GROSS SPEC: NORMAL

## 2022-10-04 ENCOUNTER — OFFICE VISIT (OUTPATIENT)
Dept: OBSTETRICS AND GYNECOLOGY | Facility: CLINIC | Age: 37
End: 2022-10-04

## 2022-10-04 VITALS
DIASTOLIC BLOOD PRESSURE: 78 MMHG | BODY MASS INDEX: 31.58 KG/M2 | SYSTOLIC BLOOD PRESSURE: 122 MMHG | HEIGHT: 64 IN | WEIGHT: 185 LBS

## 2022-10-04 DIAGNOSIS — Z09 POSTOP CHECK: Primary | ICD-10-CM

## 2022-10-04 PROCEDURE — 99024 POSTOP FOLLOW-UP VISIT: CPT | Performed by: OBSTETRICS & GYNECOLOGY

## 2022-10-04 NOTE — PROGRESS NOTES
"Subjective   Sandy Page is a 37 y.o. female.     Chief Complaint   Patient presents with   • Post-op     Patient here 5 days post op D&C with suction. Patient states bleeding is very light and not all the time. Patient c/o intermittent pain on sides of her lower abdomen.        History of Present Illness  37-year-old female  3 para 2-0-1-2 presents for postoperative visit from suction D&C on 2022.  Patient reports she is having minimal spotting.  She is requesting a return to work note.  She voices no other new complaints or concerns at this time.       Answers for HPI/ROS submitted by the patient on 10/4/2022  Please describe your symptoms.: Follow up d and c  Have you had these symptoms before?: No  How long have you been having these symptoms?: 1-4 days  What is the primary reason for your visit?: Other    Review of Systems   Genitourinary: Positive for vaginal bleeding.       Objective   /78   Ht 162.6 cm (64\")   Wt 83.9 kg (185 lb)   Breastfeeding No   BMI 31.76 kg/m²   No LMP recorded.  Physical Exam  Vitals and nursing note reviewed.   Constitutional:       General: She is not in acute distress.     Appearance: She is well-developed.   HENT:      Head: Normocephalic and atraumatic.   Eyes:      Conjunctiva/sclera: Conjunctivae normal.   Pulmonary:      Effort: Pulmonary effort is normal.   Musculoskeletal:         General: Normal range of motion.      Cervical back: Normal range of motion and neck supple.   Skin:     General: Skin is warm and dry.   Neurological:      Mental Status: She is alert and oriented to person, place, and time.   Psychiatric:         Behavior: Behavior normal.         Judgment: Judgment normal.           Assessment & Plan   Problems Addressed this Visit    None     Visit Diagnoses     Postop check    -  Primary      Diagnoses       Codes Comments    Postop check    -  Primary ICD-10-CM: Z09  ICD-9-CM: V67.00       Discussed pathology with patient "   Discussed with patient importance of glycemic control and when she gets pregnant again referring her to M for further management of her diabetes.  Work release given   All questions answered and patient verbalized understanding of plan.         Clarita Davis, DO

## 2022-10-05 ENCOUNTER — TELEPHONE (OUTPATIENT)
Dept: OBSTETRICS AND GYNECOLOGY | Facility: CLINIC | Age: 37
End: 2022-10-05

## 2022-12-19 ENCOUNTER — TELEPHONE (OUTPATIENT)
Dept: OBSTETRICS AND GYNECOLOGY | Facility: CLINIC | Age: 37
End: 2022-12-19
Payer: COMMERCIAL

## 2022-12-19 DIAGNOSIS — Z32.00 POSSIBLE PREGNANCY, NOT CONFIRMED: Primary | ICD-10-CM

## 2022-12-19 NOTE — TELEPHONE ENCOUNTER
Pt called requesting urine HCG. Pt reports LMP 11/28/22 and positive at home pregnancy test this morning.

## 2022-12-21 NOTE — TELEPHONE ENCOUNTER
You can put in an order for a quantitative hcg. Typically we don't follow them unless the patient has a history of an sti or ectopic pregnancy.

## 2023-04-11 ENCOUNTER — OFFICE VISIT (OUTPATIENT)
Dept: OBSTETRICS AND GYNECOLOGY | Facility: CLINIC | Age: 38
End: 2023-04-11
Payer: COMMERCIAL

## 2023-04-11 VITALS
BODY MASS INDEX: 30.05 KG/M2 | WEIGHT: 176 LBS | SYSTOLIC BLOOD PRESSURE: 122 MMHG | HEIGHT: 64 IN | DIASTOLIC BLOOD PRESSURE: 84 MMHG

## 2023-04-11 DIAGNOSIS — N92.0 MENORRHAGIA WITH REGULAR CYCLE: Primary | ICD-10-CM

## 2023-04-11 DIAGNOSIS — E66.9 OBESITY (BMI 30-39.9): ICD-10-CM

## 2023-04-11 RX ORDER — SEMAGLUTIDE 2.68 MG/ML
INJECTION, SOLUTION SUBCUTANEOUS
COMMUNITY
Start: 2023-01-30

## 2023-04-11 NOTE — PROGRESS NOTES
"Chief Complaint  Menstrual Problem (Pt is here with c/o having irregular periods since having her D&C in Sept. 2022. Pt states she has \"quarter size golf ball clots every day of every period\" since her procedure and has not been able to get pregnant since.  )    Atif Page presents to [facility]  History of Present Illness    The patient has had severe clots since 09/2022.  Her menses are monthly since her D and C.   Her menses are heavy everyday of her cycle.   She wears pads during her cycles.   She is inquiring about tampons for spring and summer.     She has not had a positive ovulation test this month, 04/2023.    The patient was using Ozempic when she was pregnant.   Her blood glucose levels now run 140 mg/dL to 150 mg/dL.   She has now discontinued Ozempic.   The patient takes metformin.     The patient denies having her thyroid or iron level checked in the last 2 months.   The patient is fasting.       Review of Systems   Constitutional: Negative for activity change, appetite change, fatigue and fever.   Respiratory: Negative for apnea and shortness of breath.    Cardiovascular: Negative for chest pain and palpitations.   Gastrointestinal: Negative for abdominal distention, abdominal pain, constipation, diarrhea, nausea and vomiting.   Endocrine: Negative for cold intolerance and heat intolerance.   Genitourinary: Negative for difficulty urinating, frequency, pelvic pain, vaginal discharge and vaginal pain.        Heavy bleeding     Neurological: Negative for headaches.   Psychiatric/Behavioral: Negative for agitation and sleep disturbance.         Objective   Vital Signs:   /84   Ht 162.6 cm (64\")   Wt 79.8 kg (176 lb)   BMI 30.21 kg/m²     Physical Exam  Vitals reviewed.   Constitutional:       Appearance: She is well-developed.   Eyes:      General:         Right eye: No discharge.         Left eye: No discharge.   Cardiovascular:      Rate and " Rhythm: Normal rate and regular rhythm.   Pulmonary:      Effort: Pulmonary effort is normal.      Breath sounds: Normal breath sounds.   Skin:     General: Skin is warm.   Neurological:      Mental Status: She is alert and oriented to person, place, and time.   Psychiatric:         Behavior: Behavior normal.         Thought Content: Thought content normal.         Judgment: Judgment normal.         Result Review :                       Assessment and Plan      The patient will have a transvaginal ultrasound with OV to follow.   The patient will have a laboratory work-up.   The patient was advised to continue monthly ovulation testing.   She was advised to track her menses.      Diagnoses and all orders for this visit:    1. Menorrhagia with regular cycle (Primary)  -     CBC & Differential  -     TSH  -     Vitamin D,25-Hydroxy  -     T4, Free    2. Obesity (BMI 30-39.9)            BMI is >= 30 and <35. (Class 1 Obesity). The following options were offered after discussion;: weight loss educational material (shared in after visit summary)       Follow Up   Return for US and OV, with MD.    Patient was given instructions and counseling regarding her condition or for health maintenance advice. Please see specific information pulled into the AVS if appropriate.     Transcribed from ambient dictation for DEBI Monae by Rachana Dunbar.  04/11/23   15:07 CDT    Patient or patient representative verbalized consent to the visit recording.  I have personally performed the services described in this document as transcribed by the above individual, and it is both accurate and complete.  DEBI Monae  4/17/2023  08:23 CDT

## 2023-04-12 LAB
25(OH)D3+25(OH)D2 SERPL-MCNC: 11.3 NG/ML (ref 30–100)
BASOPHILS # BLD AUTO: 0.05 10*3/MM3 (ref 0–0.2)
BASOPHILS NFR BLD AUTO: 0.5 % (ref 0–1.5)
EOSINOPHIL # BLD AUTO: 0.21 10*3/MM3 (ref 0–0.4)
EOSINOPHIL NFR BLD AUTO: 2.1 % (ref 0.3–6.2)
ERYTHROCYTE [DISTWIDTH] IN BLOOD BY AUTOMATED COUNT: 15.6 % (ref 12.3–15.4)
HCT VFR BLD AUTO: 40.4 % (ref 34–46.6)
HGB BLD-MCNC: 13.4 G/DL (ref 12–15.9)
IMM GRANULOCYTES # BLD AUTO: 0.15 10*3/MM3 (ref 0–0.05)
IMM GRANULOCYTES NFR BLD AUTO: 1.5 % (ref 0–0.5)
LYMPHOCYTES # BLD AUTO: 2.17 10*3/MM3 (ref 0.7–3.1)
LYMPHOCYTES NFR BLD AUTO: 21.9 % (ref 19.6–45.3)
MCH RBC QN AUTO: 27.2 PG (ref 26.6–33)
MCHC RBC AUTO-ENTMCNC: 33.2 G/DL (ref 31.5–35.7)
MCV RBC AUTO: 82.1 FL (ref 79–97)
MONOCYTES # BLD AUTO: 0.61 10*3/MM3 (ref 0.1–0.9)
MONOCYTES NFR BLD AUTO: 6.2 % (ref 5–12)
NEUTROPHILS # BLD AUTO: 6.71 10*3/MM3 (ref 1.7–7)
NEUTROPHILS NFR BLD AUTO: 67.8 % (ref 42.7–76)
NRBC BLD AUTO-RTO: 0 /100 WBC (ref 0–0.2)
PLATELET # BLD AUTO: 313 10*3/MM3 (ref 140–450)
RBC # BLD AUTO: 4.92 10*6/MM3 (ref 3.77–5.28)
T4 FREE SERPL-MCNC: 1.21 NG/DL (ref 0.93–1.7)
TSH SERPL DL<=0.005 MIU/L-ACNC: 2.91 UIU/ML (ref 0.27–4.2)
WBC # BLD AUTO: 9.9 10*3/MM3 (ref 3.4–10.8)

## 2023-04-14 RX ORDER — ERGOCALCIFEROL 1.25 MG/1
50000 CAPSULE ORAL WEEKLY
Qty: 12 CAPSULE | Refills: 0 | Status: SHIPPED | OUTPATIENT
Start: 2023-04-14

## 2023-04-17 NOTE — PATIENT INSTRUCTIONS

## 2023-05-01 ENCOUNTER — OFFICE VISIT (OUTPATIENT)
Dept: OBSTETRICS AND GYNECOLOGY | Facility: CLINIC | Age: 38
End: 2023-05-01
Payer: COMMERCIAL

## 2023-05-01 VITALS
SYSTOLIC BLOOD PRESSURE: 128 MMHG | DIASTOLIC BLOOD PRESSURE: 84 MMHG | BODY MASS INDEX: 29.71 KG/M2 | HEIGHT: 64 IN | WEIGHT: 174 LBS

## 2023-05-01 DIAGNOSIS — Z31.69 ENCOUNTER FOR PRECONCEPTION CONSULTATION: ICD-10-CM

## 2023-05-01 DIAGNOSIS — Z78.9 NON-SMOKER: ICD-10-CM

## 2023-05-01 DIAGNOSIS — N92.0 MENORRHAGIA WITH REGULAR CYCLE: Primary | ICD-10-CM

## 2023-05-01 PROCEDURE — 87624 HPV HI-RISK TYP POOLED RSLT: CPT | Performed by: OBSTETRICS & GYNECOLOGY

## 2023-05-01 PROCEDURE — G0123 SCREEN CERV/VAG THIN LAYER: HCPCS | Performed by: OBSTETRICS & GYNECOLOGY

## 2023-05-01 NOTE — PROGRESS NOTES
"Chief Complaint  Menorrhagia (Pt here to f/u on menorrhagia, previously saw Padmini 04/11/2023 and reported irregular periods since her D&C 09/2022 with Dr. Davis and is passing clots every day of every period since her procedure and has not been able to conceive since, had blood work done at last appt that showed low Vitamin D, but all other labs WNL, last pap 01/07/2021 ASC-H, no bx done as pt was pregnant at that time, pt is currently trying to conceive)    Atif Page presents to [facility]  History of Present Illness  Patient presents today for follow-up  She desires fertility  She has for the most part regular monthly menses that appear to be ovulatory  They can be heavy at times  Otherwise, she is without complaints  Ultrasound is ordered and is reviewed  She appears to be periovulatory  Has been has not started any new medications or workout regimens  No new testicular trauma  He follow-up with a pregnancy which delivered 20 months ago      Objective   Vital Signs:  /84 (BP Location: Right arm, Patient Position: Sitting, Cuff Size: Adult)   Ht 162.6 cm (64\")   Wt 78.9 kg (174 lb)   BMI 29.87 kg/m²   Estimated body mass index is 29.87 kg/m² as calculated from the following:    Height as of this encounter: 162.6 cm (64\").    Weight as of this encounter: 78.9 kg (174 lb).             Physical Exam  Vitals and nursing note reviewed. Exam conducted with a chaperone present.   Constitutional:       Appearance: Normal appearance.   HENT:      Head: Normocephalic and atraumatic.   Abdominal:      General: Abdomen is flat. Bowel sounds are normal.      Palpations: Abdomen is soft.   Genitourinary:     Comments: Normal pelvic exam.  Pap smear is collected.  Musculoskeletal:         General: Normal range of motion.      Cervical back: Normal range of motion and neck supple.   Skin:     General: Skin is warm and dry.   Neurological:      General: No focal deficit " present.      Mental Status: She is alert and oriented to person, place, and time. Mental status is at baseline.   Psychiatric:         Mood and Affect: Mood normal.         Behavior: Behavior normal.         Thought Content: Thought content normal.         Judgment: Judgment normal.        Result Review :                   Assessment and Plan   Diagnoses and all orders for this visit:    1. Menorrhagia with regular cycle (Primary)  -     Liquid-based Pap Smear, Screening    2. Encounter for preconception consultation  -     Progesterone  -     Antimullerian Hormone (AMH)  -     Hemoglobin A1c    3. Non-smoker             Follow Up   Return in about 7 weeks (around 6/19/2023) for With Gyn U/S, with me.  Patient was given instructions and counseling regarding her condition or for health maintenance advice. Please see specific information pulled into the AVS if appropriate.   Follow-up for labs and repeat ultrasound  Check anti-müllerian hormone given her age  Check mid luteal phase progesterone level  She does have a history of diabetes and desires a hemoglobin A1c to be checked  I did let her know that her diabetes definitely needs to be managed by primary care until she becomes pregnant  Call for concerns or questions    Kendall Goldberg MD

## 2023-05-04 LAB
GEN CATEG CVX/VAG CYTO-IMP: NORMAL
HPV I/H RISK 4 DNA CVX QL PROBE+SIG AMP: NOT DETECTED
LAB AP CASE REPORT: NORMAL
LAB AP GYN ADDITIONAL INFORMATION: NORMAL
Lab: NORMAL
PATH INTERP SPEC-IMP: NORMAL
STAT OF ADQ CVX/VAG CYTO-IMP: NORMAL

## 2023-05-12 LAB — HBA1C MFR BLD: 6.6 % (ref 4.8–5.6)

## 2023-05-14 LAB
MIS SERPL-MCNC: 4.12 NG/ML
PROGEST SERPL-MCNC: 4.5 NG/ML

## 2023-06-19 ENCOUNTER — OFFICE VISIT (OUTPATIENT)
Dept: OBSTETRICS AND GYNECOLOGY | Facility: CLINIC | Age: 38
End: 2023-06-19
Payer: COMMERCIAL

## 2023-06-19 VITALS
SYSTOLIC BLOOD PRESSURE: 118 MMHG | WEIGHT: 174 LBS | BODY MASS INDEX: 29.71 KG/M2 | DIASTOLIC BLOOD PRESSURE: 72 MMHG | HEIGHT: 64 IN

## 2023-06-19 DIAGNOSIS — E11.9 TYPE 2 DIABETES MELLITUS WITHOUT COMPLICATION, WITHOUT LONG-TERM CURRENT USE OF INSULIN: Primary | ICD-10-CM

## 2023-06-19 DIAGNOSIS — Z78.9 NON-SMOKER: ICD-10-CM

## 2023-06-19 DIAGNOSIS — Z31.69 ENCOUNTER FOR PRECONCEPTION CONSULTATION: ICD-10-CM

## 2023-06-19 DIAGNOSIS — N92.0 MENORRHAGIA WITH REGULAR CYCLE: ICD-10-CM

## 2023-06-19 PROBLEM — O24.119 PRE-EXISTING TYPE 2 DIABETES MELLITUS DURING PREGNANCY, ANTEPARTUM: Status: RESOLVED | Noted: 2022-09-20 | Resolved: 2023-06-19

## 2023-06-19 PROBLEM — Z34.90 PREGNANCY: Status: RESOLVED | Noted: 2022-09-20 | Resolved: 2023-06-19

## 2023-06-19 PROBLEM — O99.210 OBESITY IN PREGNANCY, ANTEPARTUM: Status: RESOLVED | Noted: 2022-09-20 | Resolved: 2023-06-19

## 2023-06-19 PROBLEM — O03.4 INCOMPLETE ABORTION: Status: RESOLVED | Noted: 2022-09-30 | Resolved: 2023-06-19

## 2023-06-19 PROCEDURE — 99213 OFFICE O/P EST LOW 20 MIN: CPT | Performed by: OBSTETRICS & GYNECOLOGY

## 2023-06-19 RX ORDER — SEMAGLUTIDE 2.68 MG/ML
INJECTION, SOLUTION SUBCUTANEOUS
COMMUNITY
Start: 2023-06-09

## 2023-06-19 NOTE — PROGRESS NOTES
"Chief Complaint  Follow-up (Pt here for follow up on menorrhagia. Pt had US done today. Last cycle started about 4 days late(started 6/16) and states it was very heavy with clots. )    Subjective        Sandy Page presents to [facility]  History of Present Illness    Patient presents today for follow-up  Ultrasound is ordered and is reviewed  No obvious abnormalities noted    We again reviewed menstrual cycle  We again reviewed the need for ovulation predictor kit timed intercourse  Her current partner has fathered both of her pregnancies in the last 2 years  Her hemoglobin A1c continues to be in a better range  She recently started Ozempic again  We discussed the unknown effects on fertility and pregnancy  Certainly, she will need to discontinue this with a positive pregnancy test  However, Ozempic also does have better control of controlling her diabetes which is also important in preparing her for pregnancy    Objective   Vital Signs:  /72   Ht 162.6 cm (64\")   Wt 78.9 kg (174 lb)   BMI 29.87 kg/m²   Estimated body mass index is 29.87 kg/m² as calculated from the following:    Height as of this encounter: 162.6 cm (64\").    Weight as of this encounter: 78.9 kg (174 lb).               Physical Exam  Vitals and nursing note reviewed. Exam conducted with a chaperone present.   Constitutional:       Appearance: Normal appearance.   HENT:      Head: Normocephalic and atraumatic.   Musculoskeletal:         General: Normal range of motion.   Skin:     General: Skin is warm and dry.   Neurological:      General: No focal deficit present.      Mental Status: She is alert and oriented to person, place, and time. Mental status is at baseline.   Psychiatric:         Mood and Affect: Mood normal.         Behavior: Behavior normal.         Thought Content: Thought content normal.         Judgment: Judgment normal.      Result Review :                   Assessment and Plan   Diagnoses and all " orders for this visit:    1. Type 2 diabetes mellitus without complication, without long-term current use of insulin (Primary)    2. Menorrhagia with regular cycle    3. Encounter for preconception consultation    4. Non-smoker             Follow Up   Return in about 6 months (around 12/19/2023).  Patient was given instructions and counseling regarding her condition or for health maintenance advice. Please see specific information pulled into the AVS if appropriate.   Encourage prenatal vitamin  Call for concerns or questions    Kendall Goldberg MD

## 2023-08-08 ENCOUNTER — HOSPITAL ENCOUNTER (OUTPATIENT)
Dept: GENERAL RADIOLOGY | Facility: HOSPITAL | Age: 38
Discharge: HOME | End: 2023-08-08
Admitting: FAMILY MEDICINE
Payer: COMMERCIAL

## 2023-08-08 ENCOUNTER — TRANSCRIBE ORDERS (OUTPATIENT)
Dept: ADMINISTRATIVE | Facility: HOSPITAL | Age: 38
End: 2023-08-08
Payer: COMMERCIAL

## 2023-08-08 DIAGNOSIS — M54.50 LOW BACK PAIN, UNSPECIFIED BACK PAIN LATERALITY, UNSPECIFIED CHRONICITY, UNSPECIFIED WHETHER SCIATICA PRESENT: Primary | ICD-10-CM

## 2023-08-08 DIAGNOSIS — M54.50 LOW BACK PAIN, UNSPECIFIED BACK PAIN LATERALITY, UNSPECIFIED CHRONICITY, UNSPECIFIED WHETHER SCIATICA PRESENT: ICD-10-CM

## 2023-08-08 PROCEDURE — 72100 X-RAY EXAM L-S SPINE 2/3 VWS: CPT

## 2023-12-27 ENCOUNTER — OFFICE VISIT (OUTPATIENT)
Dept: OBSTETRICS AND GYNECOLOGY | Facility: CLINIC | Age: 38
End: 2023-12-27
Payer: COMMERCIAL

## 2023-12-27 VITALS
HEIGHT: 64 IN | BODY MASS INDEX: 32.44 KG/M2 | SYSTOLIC BLOOD PRESSURE: 112 MMHG | WEIGHT: 190 LBS | DIASTOLIC BLOOD PRESSURE: 74 MMHG

## 2023-12-27 DIAGNOSIS — O24.119 PRE-EXISTING TYPE 2 DIABETES MELLITUS DURING PREGNANCY, ANTEPARTUM: ICD-10-CM

## 2023-12-27 DIAGNOSIS — E66.9 OBESITY (BMI 30-39.9): ICD-10-CM

## 2023-12-27 DIAGNOSIS — N92.0 MENORRHAGIA WITH REGULAR CYCLE: Primary | ICD-10-CM

## 2023-12-27 DIAGNOSIS — Z31.69 ENCOUNTER FOR PRECONCEPTION CONSULTATION: ICD-10-CM

## 2023-12-27 DIAGNOSIS — Z78.9 NON-SMOKER: ICD-10-CM

## 2023-12-27 RX ORDER — ESCITALOPRAM OXALATE 20 MG/1
20 TABLET ORAL DAILY
COMMUNITY
Start: 2023-08-08

## 2023-12-27 RX ORDER — BUPROPION HYDROCHLORIDE 150 MG/1
150 TABLET ORAL EVERY MORNING
COMMUNITY

## 2023-12-27 RX ORDER — MELOXICAM 15 MG/1
TABLET ORAL
COMMUNITY
Start: 2023-08-08

## 2023-12-27 NOTE — PROGRESS NOTES
"Chief Complaint  Follow-up (Pt here for 6mo f/u on menorrhagia, she is attempting conception, she reports her periods have improved and are heavy at first but normal after )    Subjective        Sandy Page presents to [facility]  History of Present Illness    Patient presents today for follow-up  Previous office note, labs, and ultrasounds are reviewed  Recently obtained anti-müllerian hormone was normal  She continues to have regular menses.  Ovulation predictor kits are changing predictably.  She is having intercourse at the appropriate time  She has been trying for 14 months  She is now off of her Ozempic  She reports her blood sugars well-controlled on metformin and diet alone  She takes Mobic occasionally for low back pain  We discussed that would be contraindicated when she became pregnant    Objective   Vital Signs:  /74 (BP Location: Right arm, Patient Position: Sitting, Cuff Size: Adult)   Ht 162.6 cm (64\")   Wt 86.2 kg (190 lb)   BMI 32.61 kg/m²   Estimated body mass index is 32.61 kg/m² as calculated from the following:    Height as of this encounter: 162.6 cm (64\").    Weight as of this encounter: 86.2 kg (190 lb).               Physical Exam  Vitals and nursing note reviewed. Exam conducted with a chaperone present.   Constitutional:       Appearance: Normal appearance.   HENT:      Head: Normocephalic and atraumatic.   Musculoskeletal:         General: Normal range of motion.   Skin:     General: Skin is warm and dry.   Neurological:      General: No focal deficit present.      Mental Status: She is alert and oriented to person, place, and time. Mental status is at baseline.   Psychiatric:         Mood and Affect: Mood normal.         Behavior: Behavior normal.         Thought Content: Thought content normal.         Judgment: Judgment normal.        Result Review :                   Assessment and Plan   Diagnoses and all orders for this visit:    1. Menorrhagia " with regular cycle (Primary)    2. Encounter for preconception consultation    3. Pre-existing type 2 diabetes mellitus during pregnancy, antepartum    4. Obesity (BMI 30-39.9)    5. Non-smoker             Follow Up   Return for will call for mid cycle follicle U/S.  Patient was given instructions and counseling regarding her condition or for health maintenance advice. Please see specific information pulled into the AVS if appropriate.     Midcycle ultrasound to check follicle  Call for concerns or questions    Kendall Goldberg MD

## 2024-02-20 NOTE — TELEPHONE ENCOUNTER
Pharmacy Name:  [facility]    Pharmacy representative name: DESTINY    Pharmacy representative phone number: [phone]    What medication are you calling in regards to: ARISTIDES    What question does the pharmacy have: SHE STATES MEDICATION WAS APPROVED ON 6-17-22 THRU 6-17-23    SHE STATES PT IS NOW ELIGIBLE FOR A SAVINGS CARD WITH NO PAY    SHE WILL FAX SAVING CARD INFO TO CLINIC   
I AM CURRENTLY ON THE PHONE WITH THEM NOW. LS    I HAVE SUBMITTED THIS INFORMATION AGAIN AND WILL BE WAITING FOR A FAXED RESPONSE. ELISSA.   
Provider: SHILO  Caller: DESTINY COHEN.  Relationship to Patient: N/A  Pharmacy: N/A  Phone Number: [phone]  Reason for Call: ARISTIDES BOWLES TELEPHONED WITH ADDITIONAL QUESTIONS RE: PRIOR AUTH.    PLEASE CALL & ADVISE.    THANK  YOU.      
1

## 2024-08-23 ENCOUNTER — HOSPITAL ENCOUNTER (EMERGENCY)
Age: 39
Discharge: HOME | End: 2024-08-23
Payer: MEDICAID

## 2024-08-23 ENCOUNTER — APPOINTMENT (OUTPATIENT)
Dept: CT IMAGING | Age: 39
End: 2024-08-23
Payer: MEDICAID

## 2024-08-23 VITALS
TEMPERATURE: 97.9 F | DIASTOLIC BLOOD PRESSURE: 89 MMHG | BODY MASS INDEX: 27.14 KG/M2 | OXYGEN SATURATION: 100 % | SYSTOLIC BLOOD PRESSURE: 133 MMHG | HEART RATE: 91 BPM | HEIGHT: 64 IN | WEIGHT: 159 LBS | RESPIRATION RATE: 18 BRPM

## 2024-08-23 DIAGNOSIS — N20.0 KIDNEY STONE: Primary | ICD-10-CM

## 2024-08-23 DIAGNOSIS — N39.0 URINARY TRACT INFECTION IN FEMALE: ICD-10-CM

## 2024-08-23 DIAGNOSIS — R10.9 LEFT FLANK PAIN: ICD-10-CM

## 2024-08-23 LAB
ALBUMIN SERPL-MCNC: 4.6 G/DL (ref 3.5–5.2)
ALP SERPL-CCNC: 84 U/L (ref 35–104)
ALT SERPL-CCNC: 22 U/L (ref 5–33)
ANION GAP SERPL CALCULATED.3IONS-SCNC: 14 MMOL/L (ref 7–19)
AST SERPL-CCNC: 21 U/L (ref 5–32)
BACTERIA #/AREA URNS HPF: ABNORMAL /HPF
BASOPHILS # BLD: 0 K/UL (ref 0–0.2)
BASOPHILS NFR BLD: 0.3 % (ref 0–1)
BILIRUB SERPL-MCNC: 1.4 MG/DL (ref 0.2–1.2)
BILIRUB UR QL STRIP: NEGATIVE
BUN SERPL-MCNC: 6 MG/DL (ref 6–20)
CALCIUM SERPL-MCNC: 9.7 MG/DL (ref 8.6–10)
CHLORIDE SERPL-SCNC: 105 MMOL/L (ref 98–111)
CLARITY UR: ABNORMAL
CO2 SERPL-SCNC: 22 MMOL/L (ref 22–29)
COLOR UR: ABNORMAL
CREAT SERPL-MCNC: 0.7 MG/DL (ref 0.5–0.9)
CRYSTALS URNS MICRO: ABNORMAL /HPF
EOSINOPHIL # BLD: 0.1 K/UL (ref 0–0.6)
EOSINOPHIL NFR BLD: 1.4 % (ref 0–5)
ERYTHROCYTE [DISTWIDTH] IN BLOOD BY AUTOMATED COUNT: 13.1 % (ref 11.5–14.5)
GLUCOSE SERPL-MCNC: 112 MG/DL (ref 70–99)
GLUCOSE UR STRIP.AUTO-MCNC: NEGATIVE MG/DL
HCG SERPL QL: NEGATIVE
HCT VFR BLD AUTO: 41.6 % (ref 37–47)
HGB BLD-MCNC: 14.8 G/DL (ref 12–16)
HGB UR STRIP.AUTO-MCNC: ABNORMAL MG/L
IMM GRANULOCYTES # BLD: 0.1 K/UL
KETONES UR STRIP.AUTO-MCNC: ABNORMAL MG/DL
LEUKOCYTE ESTERASE UR QL STRIP.AUTO: ABNORMAL
LYMPHOCYTES # BLD: 2.1 K/UL (ref 1.1–4.5)
LYMPHOCYTES NFR BLD: 21.2 % (ref 20–40)
MCH RBC QN AUTO: 30.8 PG (ref 27–31)
MCHC RBC AUTO-ENTMCNC: 35.6 G/DL (ref 33–37)
MCV RBC AUTO: 86.7 FL (ref 81–99)
MONOCYTES # BLD: 0.5 K/UL (ref 0–0.9)
MONOCYTES NFR BLD: 5.2 % (ref 0–10)
NEUTROPHILS # BLD: 7.2 K/UL (ref 1.5–7.5)
NEUTS SEG NFR BLD: 71.3 % (ref 50–65)
NITRITE UR QL STRIP.AUTO: NEGATIVE
PH UR STRIP.AUTO: 6 [PH] (ref 5–8)
PLATELET # BLD AUTO: 295 K/UL (ref 130–400)
PMV BLD AUTO: 11.6 FL (ref 9.4–12.3)
POTASSIUM SERPL-SCNC: 3.7 MMOL/L (ref 3.5–5)
PROT SERPL-MCNC: 8 G/DL (ref 6.6–8.7)
PROT UR STRIP.AUTO-MCNC: 30 MG/DL
RBC # BLD AUTO: 4.8 M/UL (ref 4.2–5.4)
RBC #/AREA URNS HPF: ABNORMAL /HPF (ref 0–2)
SODIUM SERPL-SCNC: 141 MMOL/L (ref 136–145)
SP GR UR STRIP.AUTO: 1.02 (ref 1–1.03)
SQUAMOUS #/AREA URNS HPF: ABNORMAL /HPF
UROBILINOGEN UR STRIP.AUTO-MCNC: 1 E.U./DL
WBC # BLD AUTO: 10 K/UL (ref 4.8–10.8)
WBC #/AREA URNS HPF: ABNORMAL /HPF (ref 0–5)

## 2024-08-23 PROCEDURE — 85025 COMPLETE CBC W/AUTO DIFF WBC: CPT

## 2024-08-23 PROCEDURE — 96375 TX/PRO/DX INJ NEW DRUG ADDON: CPT

## 2024-08-23 PROCEDURE — 96361 HYDRATE IV INFUSION ADD-ON: CPT

## 2024-08-23 PROCEDURE — 84703 CHORIONIC GONADOTROPIN ASSAY: CPT

## 2024-08-23 PROCEDURE — 80053 COMPREHEN METABOLIC PANEL: CPT

## 2024-08-23 PROCEDURE — 96374 THER/PROPH/DIAG INJ IV PUSH: CPT

## 2024-08-23 PROCEDURE — 36415 COLL VENOUS BLD VENIPUNCTURE: CPT

## 2024-08-23 PROCEDURE — 6370000000 HC RX 637 (ALT 250 FOR IP): Performed by: NURSE PRACTITIONER

## 2024-08-23 PROCEDURE — 74176 CT ABD & PELVIS W/O CONTRAST: CPT

## 2024-08-23 PROCEDURE — 99284 EMERGENCY DEPT VISIT MOD MDM: CPT

## 2024-08-23 PROCEDURE — 2580000003 HC RX 258: Performed by: NURSE PRACTITIONER

## 2024-08-23 PROCEDURE — 6360000002 HC RX W HCPCS: Performed by: NURSE PRACTITIONER

## 2024-08-23 PROCEDURE — 87086 URINE CULTURE/COLONY COUNT: CPT

## 2024-08-23 PROCEDURE — 81001 URINALYSIS AUTO W/SCOPE: CPT

## 2024-08-23 RX ORDER — MORPHINE SULFATE 4 MG/ML
4 INJECTION, SOLUTION INTRAMUSCULAR; INTRAVENOUS ONCE
Status: COMPLETED | OUTPATIENT
Start: 2024-08-23 | End: 2024-08-23

## 2024-08-23 RX ORDER — NITROFURANTOIN 25; 75 MG/1; MG/1
100 CAPSULE ORAL 2 TIMES DAILY
Qty: 20 CAPSULE | Refills: 0 | Status: SHIPPED | OUTPATIENT
Start: 2024-08-23 | End: 2024-09-02

## 2024-08-23 RX ORDER — HYDROCODONE BITARTRATE AND ACETAMINOPHEN 5; 325 MG/1; MG/1
1 TABLET ORAL EVERY 6 HOURS PRN
Qty: 12 TABLET | Refills: 0 | Status: SHIPPED | OUTPATIENT
Start: 2024-08-23 | End: 2024-08-26

## 2024-08-23 RX ORDER — ONDANSETRON 2 MG/ML
4 INJECTION INTRAMUSCULAR; INTRAVENOUS ONCE
Status: COMPLETED | OUTPATIENT
Start: 2024-08-23 | End: 2024-08-23

## 2024-08-23 RX ORDER — TAMSULOSIN HYDROCHLORIDE 0.4 MG/1
0.4 CAPSULE ORAL DAILY
Qty: 10 CAPSULE | Refills: 0 | Status: SHIPPED | OUTPATIENT
Start: 2024-08-23

## 2024-08-23 RX ORDER — BUPROPION HYDROCHLORIDE 150 MG/1
150 TABLET ORAL EVERY MORNING
COMMUNITY

## 2024-08-23 RX ORDER — 0.9 % SODIUM CHLORIDE 0.9 %
500 INTRAVENOUS SOLUTION INTRAVENOUS ONCE
Status: COMPLETED | OUTPATIENT
Start: 2024-08-23 | End: 2024-08-23

## 2024-08-23 RX ORDER — ONDANSETRON 4 MG/1
4 TABLET, FILM COATED ORAL 3 TIMES DAILY PRN
Qty: 15 TABLET | Refills: 0 | Status: SHIPPED | OUTPATIENT
Start: 2024-08-23

## 2024-08-23 RX ORDER — TAMSULOSIN HYDROCHLORIDE 0.4 MG/1
0.4 CAPSULE ORAL ONCE
Status: COMPLETED | OUTPATIENT
Start: 2024-08-23 | End: 2024-08-23

## 2024-08-23 RX ADMIN — MORPHINE SULFATE 4 MG: 4 INJECTION, SOLUTION INTRAMUSCULAR; INTRAVENOUS at 16:38

## 2024-08-23 RX ADMIN — SODIUM CHLORIDE 500 ML: 9 INJECTION, SOLUTION INTRAVENOUS at 16:42

## 2024-08-23 RX ADMIN — TAMSULOSIN HYDROCHLORIDE 0.4 MG: 0.4 CAPSULE ORAL at 18:46

## 2024-08-23 RX ADMIN — ONDANSETRON 4 MG: 2 INJECTION INTRAMUSCULAR; INTRAVENOUS at 16:37

## 2024-08-23 ASSESSMENT — ENCOUNTER SYMPTOMS
NAUSEA: 1
RESPIRATORY NEGATIVE: 1

## 2024-08-23 ASSESSMENT — PAIN DESCRIPTION - LOCATION
LOCATION: ABDOMEN
LOCATION: ABDOMEN

## 2024-08-23 ASSESSMENT — PAIN SCALES - GENERAL
PAINLEVEL_OUTOF10: 8
PAINLEVEL_OUTOF10: 3

## 2024-08-23 NOTE — ED PROVIDER NOTES
(FLOMAX) capsule 0.4 mg (has no administration in time range)   morphine injection 4 mg (4 mg IntraVENous Given 8/23/24 1638)   ondansetron (ZOFRAN) injection 4 mg (4 mg IntraVENous Given 8/23/24 1637)   sodium chloride 0.9 % bolus 500 mL (0 mLs IntraVENous Stopped 8/23/24 1730)       EMERGENCY DEPARTMENT COURSE and DIFFERENTIALDIAGNOSIS/MDM:   Vitals:    Vitals:    08/23/24 1556   BP: (!) 147/100   Pulse: (!) 102   Resp: 20   Temp: 97.4 °F (36.3 °C)   TempSrc: Oral   SpO2: 99%   Weight: 72.1 kg (159 lb)   Height: 1.626 m (5' 4\")       MDM  Number of Diagnoses or Management Options  Kidney stone  Left flank pain  Diagnosis management comments: 39-year-old nontoxic-appearing female presented to the emergency department with complaint of left flank pain.  Patient has a history of kidney stones and reports symptoms similar to last episode of kidney stone.  She endorsed nausea and had 1 episode of vomiting in the emergency department.  CBC and CMP are unremarkable, urinalysis is positive for urinary tract infection.  Serum pregnancy is negative.  CT abdomen pelvis shows mild left hydronephrosis and appearance that she may have recently passed a kidney stone, however stone may be obscured by her uterus.  Patient endorsed passing of blood when providing a urine specimen in the emergency department and had not had dysuria until providing the specimen.  Will treat patient with antibiotics, pain medication and ondansetron.  Patient sees a urologist and will call on Monday for follow-up appointment.       Amount and/or Complexity of Data Reviewed  Clinical lab tests: ordered and reviewed  Tests in the radiology section of CPT®: ordered and reviewed             CONSULTS:  None    PROCEDURES:  Unless otherwise notedbelow, none     Procedures      FINAL IMPRESSION     1. Kidney stone    2. Left flank pain          DISPOSITION/PLAN   DISPOSITION Decision To Discharge 08/23/2024 06:40:47 PM  Condition at Disposition:

## 2024-08-23 NOTE — DISCHARGE INSTRUCTIONS
Be sure you are drinking plenty of water.  You may still have a stone in your bladder.  Take the Flomax for the next 3 days.  Pain medication as prescribed.  Nausea medication as prescribed.  Return to the ER for worsening symptoms, development of fever or any other concerns.  Follow-up with your urologist.  Call on Monday for an appointment.  Take all of the antibiotic.

## 2024-08-25 LAB — BACTERIA UR CULT: NORMAL

## 2024-10-09 ENCOUNTER — OFFICE VISIT (OUTPATIENT)
Dept: NEUROSURGERY | Facility: CLINIC | Age: 39
End: 2024-10-09
Payer: MEDICAID

## 2024-10-09 VITALS — BODY MASS INDEX: 26.12 KG/M2 | WEIGHT: 153 LBS | HEIGHT: 64 IN

## 2024-10-09 DIAGNOSIS — Z78.9 NON-SMOKER: ICD-10-CM

## 2024-10-09 DIAGNOSIS — G93.2 BENIGN INTRACRANIAL HYPERTENSION: Primary | ICD-10-CM

## 2024-10-09 DIAGNOSIS — E66.3 OVERWEIGHT WITH BODY MASS INDEX (BMI) OF 26 TO 26.9 IN ADULT: ICD-10-CM

## 2024-10-09 RX ORDER — SEMAGLUTIDE 2.68 MG/ML
INJECTION, SOLUTION SUBCUTANEOUS
COMMUNITY
Start: 2024-09-09

## 2024-10-09 RX ORDER — TRAZODONE HYDROCHLORIDE 50 MG/1
50 TABLET, FILM COATED ORAL
COMMUNITY
Start: 2024-09-09
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
## 2024-10-09 NOTE — PATIENT INSTRUCTIONS
"DASH Eating Plan  DASH stands for Dietary Approaches to Stop Hypertension. The DASH eating plan is a healthy eating plan that has been shown to:  Lower high blood pressure (hypertension).  Reduce your risk for type 2 diabetes, heart disease, and stroke.  Help with weight loss.  What are tips for following this plan?  Reading food labels  Check food labels for the amount of salt (sodium) per serving. Choose foods with less than 5 percent of the Daily Value (DV) of sodium. In general, foods with less than 300 milligrams (mg) of sodium per serving fit into this eating plan.  To find whole grains, look for the word \"whole\" as the first word in the ingredient list.  Shopping  Buy products labeled as \"low-sodium\" or \"no salt added.\"  Buy fresh foods. Avoid canned foods and pre-made or frozen meals.  Cooking  Try not to add salt when you cook. Use salt-free seasonings or herbs instead of table salt or sea salt. Check with your health care provider or pharmacist before using salt substitutes.  Do not li foods. Cook foods in healthy ways, such as baking, boiling, grilling, roasting, or broiling.  Cook using oils that are good for your heart. These include olive, canola, avocado, soybean, and sunflower oil.  Meal planning    Eat a balanced diet. This should include:  4 or more servings of fruits and 4 or more servings of vegetables each day. Try to fill half of your plate with fruits and vegetables.  6-8 servings of whole grains each day.  6 or less servings of lean meat, poultry, or fish each day. 1 oz is 1 serving. A 3 oz (85 g) serving of meat is about the same size as the palm of your hand. One egg is 1 oz (28 g).  2-3 servings of low-fat dairy each day. One serving is 1 cup (237 mL).  1 serving of nuts, seeds, or beans 5 times each week.  2-3 servings of heart-healthy fats. Healthy fats called omega-3 fatty acids are found in foods such as walnuts, flaxseeds, fortified milks, and eggs. These fats are also found in " cold-water fish, such as sardines, salmon, and mackerel.  Limit how much you eat of:  Canned or prepackaged foods.  Food that is high in trans fat, such as fried foods.  Food that is high in saturated fat, such as fatty meat.  Desserts and other sweets, sugary drinks, and other foods with added sugar.  Full-fat dairy products.  Do not salt foods before eating.  Do not eat more than 4 egg yolks a week.  Try to eat at least 2 vegetarian meals a week.  Eat more home-cooked food and less restaurant, buffet, and fast food.  Lifestyle  When eating at a restaurant, ask if your food can be made with less salt or no salt.  If you drink alcohol:  Limit how much you have to:  0-1 drink a day if you are female.  0-2 drinks a day if you are male.  Know how much alcohol is in your drink. In the [place] one drink is one 12 oz bottle of beer (355 mL), one 5 oz glass of wine (148 mL), or one 1½ oz glass of hard liquor (44 mL).  General information  Avoid eating more than 2,300 mg of salt a day. If you have hypertension, you may need to reduce your sodium intake to 1,500 mg a day.  Work with your provider to stay at a healthy body weight or lose weight. Ask what the best weight range is for you.  On most days of the week, get at least 30 minutes of exercise that causes your heart to beat faster. This may include walking, swimming, or biking.  Work with your provider or dietitian to adjust your eating plan to meet your specific calorie needs.  What foods should I eat?  Fruits  All fresh, dried, or frozen fruit. Canned fruits that are in their natural juice and do not have sugar added to them.  Vegetables  Fresh or frozen vegetables that are raw, steamed, roasted, or grilled. Low-sodium or reduced-sodium tomato and vegetable juice. Low-sodium or reduced-sodium tomato sauce and tomato paste. Low-sodium or reduced-sodium canned vegetables.  Grains  Whole-grain or whole-wheat bread. Whole-grain or whole-wheat pasta. Brown rice. Oatmeal.  Quinoa. Bulgur. Whole-grain and low-sodium cereals. Inna bread. Low-fat, low-sodium crackers. Whole-wheat flour tortillas.  Meats and other proteins  Skinless chicken or turkey. Ground chicken or turkey. Pork with fat trimmed off. Fish and seafood. Egg whites. Dried beans, peas, or lentils. Unsalted nuts, nut butters, and seeds. Unsalted canned beans. Lean cuts of beef with fat trimmed off. Low-sodium, lean precooked or cured meat, such as sausages or meat loaves.  Dairy  Low-fat (1%) or fat-free (skim) milk. Reduced-fat, low-fat, or fat-free cheeses. Nonfat, low-sodium ricotta or cottage cheese. Low-fat or nonfat yogurt. Low-fat, low-sodium cheese.  Fats and oils  Soft margarine without trans fats. Vegetable oil. Reduced-fat, low-fat, or light mayonnaise and salad dressings (reduced-sodium). Canola, safflower, olive, avocado, soybean, and sunflower oils. Avocado.  Seasonings and condiments  Herbs. Spices. Seasoning mixes without salt.  Other foods  Unsalted popcorn and pretzels. Fat-free sweets.  The items listed above may not be all the foods and drinks you can have. Talk to a dietitian to learn more.  What foods should I avoid?  Fruits  Canned fruit in a light or heavy syrup. Fried fruit. Fruit in cream or butter sauce.  Vegetables  Creamed or fried vegetables. Vegetables in a cheese sauce. Regular canned vegetables that are not marked as low-sodium or reduced-sodium. Regular canned tomato sauce and paste that are not marked as low-sodium or reduced-sodium. Regular tomato and vegetable juices that are not marked as low-sodium or reduced-sodium. Pickles. Olives.  Grains  Baked goods made with fat, such as croissants, muffins, or some breads. Dry pasta or rice meal packs.  Meats and other proteins  Fatty cuts of meat. Ribs. Fried meat. [place]. Bologna, salami, and other precooked or cured meats, such as sausages or meat loaves, that are not lean and low in sodium. Fat from the back of a pig (fatback). Alex.  Salted nuts and seeds. Canned beans with added salt. Canned or smoked fish. Whole eggs or egg yolks. Chicken or turkey with skin.  Dairy  Whole or 2% milk, cream, and half-and-half. Whole or full-fat cream cheese. Whole-fat or sweetened yogurt. Full-fat cheese. Nondairy creamers. Whipped toppings. Processed cheese and cheese spreads.  Fats and oils  Butter. Stick margarine. Lard. Shortening. Ghee. [place] fat. Tropical oils, such as coconut, palm kernel, or palm oil.  Seasonings and condiments  Onion salt, garlic salt, seasoned salt, table salt, and sea salt. Worcestershire sauce. Tartar sauce. Barbecue sauce. Teriyaki sauce. Soy sauce, including reduced-sodium soy sauce. Steak sauce. Canned and packaged gravies. Fish sauce. Oyster sauce. Cocktail sauce. Store-bought horseradish. Ketchup. Mustard. Meat flavorings and tenderizers. Bouillon cubes. Hot sauces. Pre-made or packaged marinades. Pre-made or packaged taco seasonings. Relishes. Regular salad dressings.  Other foods  Salted popcorn and pretzels.  The items listed above may not be all the foods and drinks you should avoid. Talk to a dietitian to learn more.  Where to find more information  National Heart, Lung, and Blood [place] (NHLBI): nhlbi.nih.gov  American Heart Association (AHA): heart.org  Academy of Nutrition and Dietetics: eatright.org  National Kidney Foundation (NKF): kidney.org  This information is not intended to replace advice given to you by your health care provider. Make sure you discuss any questions you have with your health care provider.  Document Revised: 01/04/2024 Document Reviewed: 01/04/2024  Elsevier Patient Education © 2024 [employer]  BMI for Adults  Body mass index (BMI) is a number found using a person's weight and height. BMI can help tell how much of a person's weight is made up of fat. BMI does not measure body fat directly. It is used instead of tests that directly measure body fat, which can be difficult and  "expensive.  What are BMI measurements used for?  BMI is useful to:  Find out if your weight puts you at higher risk for medical problems.  Help recommend changes, such as in diet and exercise. This can help you reach a healthy weight. BMI screening can be done again to see if these changes are working.  How is BMI calculated?  Your height and weight are measured. The BMI is found from those numbers. This can be done with [place] or metric measurements. Note that charts and online BMI calculators are available to help you find your BMI quickly and easily without doing these calculations.  To calculate your BMI in [place] measurements:  Measure your weight in pounds (lb).  Multiply the number of pounds by 703.  So, for an adult who weighs 150 lb, multiply that number by 703: 150 x 703, which equals 105,450.  Measure your height in inches. Then multiply that number by itself to get a measurement called \"inches squared.\"  So, for an adult who is 70 inches tall, the \"inches squared\" measurement is 70 inches x 70 inches, which equals 4,900 inches squared.  Divide the total from step 2 (number of lb x 703) by the total from step 3 (inches squared): 105,450 ÷ 4,900 = 21.5. This is your BMI.  To calculate your BMI in metric measurements:    Measure your weight in kilograms (kg).  For this example, the weight is 70 kg.  Measure your height in meters (m). Then multiply that number by itself to get a measurement called \"meters squared.\"  So, for an adult who is 1.75 m tall, the \"meters squared\" measurement is 1.75 m x 1.75 m, which equals 3.1 meters squared.  Divide the number of kilograms (your weight) by the meters squared number. In this example: 70 ÷ 3.1 = 22.6. This is your BMI.  What do the results mean?  BMI charts are used to see if you are underweight, normal weight, overweight, or obese. The following guidelines will be used:  Underweight: BMI less than 18.5.  Normal weight: BMI between 18.5 and 24.9.  Overweight: BMI " between 25 and 29.9.  Obese: BMI of 30 or above.  BMI is a tool and cannot diagnose a condition. Talk with your health care provider about what your BMI means for you. Keep these notes in mind:  Weight includes fat and muscle. Someone with a muscular build, such as an athlete, may have a BMI that is higher than 24.9. In cases like these, BMI is not a correct measure of body fat.  If you have a BMI of 25 or higher, your provider may need to do more testing to find out if excess body fat is the cause.  BMI is measured the same way for males and females. Females usually have more body fat than males of the same height and weight.  Where to find more information  For more information about BMI, including tools to quickly find your BMI, go to:  Centers for Disease Control and Prevention: cdc.gov  American Heart Association: heart.org  National Heart, Lung, and Blood [employer]: nhlbi.nih.gov  This information is not intended to replace advice given to you by your health care provider. Make sure you discuss any questions you have with your health care provider.  Document Revised: 09/07/2023 Document Reviewed: 08/31/2023  Elsevier Patient Education © 2024 Elsevier Inc.

## 2025-02-13 ENCOUNTER — TRANSCRIBE ORDERS (OUTPATIENT)
Dept: ADMINISTRATIVE | Facility: HOSPITAL | Age: 40
End: 2025-02-13

## 2025-02-13 DIAGNOSIS — Z12.39 ENCOUNTER FOR OTHER SCREENING FOR MALIGNANT NEOPLASM OF BREAST: Primary | ICD-10-CM

## 2025-02-25 LAB
NCCN CRITERIA FLAG: NORMAL
TYRER CUZICK SCORE: 9.2

## 2025-02-27 ENCOUNTER — HOSPITAL ENCOUNTER (OUTPATIENT)
Dept: MAMMOGRAPHY | Facility: HOSPITAL | Age: 40
Discharge: HOME | End: 2025-02-27
Admitting: FAMILY MEDICINE
Payer: MEDICAID

## 2025-02-27 DIAGNOSIS — Z12.39 ENCOUNTER FOR OTHER SCREENING FOR MALIGNANT NEOPLASM OF BREAST: ICD-10-CM

## 2025-02-27 PROCEDURE — 77063 BREAST TOMOSYNTHESIS BI: CPT

## 2025-02-27 PROCEDURE — 77067 SCR MAMMO BI INCL CAD: CPT

## 2025-03-12 ENCOUNTER — TELEPHONE (OUTPATIENT)
Dept: NEUROSURGERY | Facility: CLINIC | Age: 40
End: 2025-03-12
Payer: MEDICAID

## 2025-03-12 NOTE — TELEPHONE ENCOUNTER
[facility] PATEINT IN REGARDS TO FOLLOW UP WITH JAIDA IN [facility] HE WILL BE OUT OF OFFICE NEED TO MOVE APPT TO 4/1/2025 AT 9:45     It is okay for the hub to relay the message to the patient and schedule if possible

## 2025-04-01 ENCOUNTER — HOSPITAL ENCOUNTER (OUTPATIENT)
Dept: GENERAL RADIOLOGY | Facility: HOSPITAL | Age: 40
Discharge: HOME | End: 2025-04-01
Payer: MEDICAID

## 2025-04-01 ENCOUNTER — OFFICE VISIT (OUTPATIENT)
Dept: NEUROSURGERY | Facility: CLINIC | Age: 40
End: 2025-04-01
Payer: MEDICAID

## 2025-04-01 VITALS — BODY MASS INDEX: 23.22 KG/M2 | WEIGHT: 136 LBS | HEIGHT: 64 IN

## 2025-04-01 DIAGNOSIS — M54.50 CHRONIC BILATERAL LOW BACK PAIN WITHOUT SCIATICA: ICD-10-CM

## 2025-04-01 DIAGNOSIS — G89.29 CHRONIC BILATERAL LOW BACK PAIN WITHOUT SCIATICA: ICD-10-CM

## 2025-04-01 DIAGNOSIS — M54.50 CHRONIC BILATERAL LOW BACK PAIN WITHOUT SCIATICA: Primary | ICD-10-CM

## 2025-04-01 DIAGNOSIS — G89.29 CHRONIC BILATERAL LOW BACK PAIN WITHOUT SCIATICA: Primary | ICD-10-CM

## 2025-04-01 PROCEDURE — 99214 OFFICE O/P EST MOD 30 MIN: CPT | Performed by: NURSE PRACTITIONER

## 2025-04-01 PROCEDURE — 72114 X-RAY EXAM L-S SPINE BENDING: CPT

## 2025-04-01 PROCEDURE — 1160F RVW MEDS BY RX/DR IN RCRD: CPT | Performed by: NURSE PRACTITIONER

## 2025-04-01 PROCEDURE — 1159F MED LIST DOCD IN RCRD: CPT | Performed by: NURSE PRACTITIONER

## 2025-04-01 PROCEDURE — 72082 X-RAY EXAM ENTIRE SPI 2/3 VW: CPT

## 2025-04-01 RX ORDER — DICLOFENAC SODIUM 75 MG/1
75 TABLET, DELAYED RELEASE ORAL 2 TIMES DAILY
Qty: 60 TABLET | Refills: 2 | Status: SHIPPED | OUTPATIENT
Start: 2025-04-01
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
## 2025-04-02 ENCOUNTER — TELEPHONE (OUTPATIENT)
Dept: NEUROSURGERY | Facility: CLINIC | Age: 40
End: 2025-04-02
Payer: MEDICAID

## 2025-04-03 NOTE — TELEPHONE ENCOUNTER
Placed a call to inform pt X-ray shows mild disc degeneration. Ended call with pt understanding and acknowledgment.

## 2025-04-16 ENCOUNTER — HOSPITAL ENCOUNTER (OUTPATIENT)
Dept: PHYSICAL THERAPY | Facility: HOSPITAL | Age: 40
Setting detail: THERAPIES SERIES
Discharge: HOME | End: 2025-04-16
Payer: MEDICAID

## 2025-04-16 DIAGNOSIS — M54.50 LUMBAR PAIN: Primary | ICD-10-CM

## 2025-04-16 PROCEDURE — 97162 PT EVAL MOD COMPLEX 30 MIN: CPT
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
## 2025-04-24 ENCOUNTER — HOSPITAL ENCOUNTER (OUTPATIENT)
Dept: PHYSICAL THERAPY | Facility: HOSPITAL | Age: 40
Setting detail: THERAPIES SERIES
Discharge: HOME | End: 2025-04-24
Payer: MEDICAID

## 2025-04-24 DIAGNOSIS — M54.50 LUMBAR PAIN: Primary | ICD-10-CM

## 2025-04-24 PROCEDURE — 97140 MANUAL THERAPY 1/> REGIONS: CPT

## 2025-04-24 PROCEDURE — 97110 THERAPEUTIC EXERCISES: CPT

## 2025-04-24 NOTE — THERAPY TREATMENT NOTE
Physical Therapy Treatment Note  [facility]  A department [facility]  [address], [place] [address]    Patient: Sandy Page                                                 Visit Date: 2025  :     1985    Referring practitioner:    DEBI Valdez  Date of Initial Visit:          Type: THERAPY  Episode: LBP  Number of visits this episode: 2    Visit Diagnoses:    ICD-10-CM ICD-9-CM   1. Lumbar pain  M54.50 724.2     SUBJECTIVE     Subjective: States she is still having some pain and tightness, and has noticed a cold and tingling down the middle of her spine 4-5 times the past week.       PAIN: 5/10     OBJECTIVE     Objective     Therapeutic Exercises    04699 Units Comments   Seated 3 way Lx flex stretch w/ SB 10x5 sec ea    LTR w/ SB 3 min    DKTC w/ SB 3 min    HL hip ABD w/ RTB 2x10 Slow and controlled   HL hip ADD w/ blue ball 2x10x3 sec         Timed Minutes 23     Manual Therapy     [id number]  Comments   B HS/piriformis stretches    B hip IR/ER stretches    BLE LAD    STM to B Lx aron    STM to B HF's    SI check Rotation present, corrected       Timed Minutes 19        Therapy Education/Self Care 29602   Education offered today POC, nature of condition, HEP   Med[employer]ide Code [email or web address]   Ongoing HEP      Date: 2025  Prepared by: Vanessa Weldon     Exercises  - Seated Hamstring Stretch  - 2-3 x daily - 4 x weekly - 4 reps - 30 secs hold  - Supine Bilateral Hip Internal Rotation Stretch  - 2-3 x daily - 4 x weekly - 4 reps - 30 sec hold  - Supine Bridge with Gluteal Set and Spinal Articulation  - 1 x daily - 4 x weekly - 2-3 sets - 10 reps   Timed Minutes         Total Timed Treatment:     42   mins  Total Time of Visit:             42   mins         ASSESSMENT/PLAN     GOALS  Goals                                                  Progress Note due by 2025                                                              Recert due by  7/15/2025   LTG by: 12 weeks Comments Date Status   Patient will be able to lift >/= 25 pounds from floor to waist and carry >/= 30 feet with optimal body mechanics and no pain increase so that she can lift her toddler.         Improve glute strength B to >/=4+/5         Patient to improve bilateral hamstring length to greater than or equal 160 degrees to minimize muscle imbalances for optimal movement.         Reports pain </= for/10 at worst consistently w/ performance of ADL/IADLs, functional mobility, and work-related tasks.         Patient will be able to tolerate sitting in the floor >/= 15 minutes with optimal posture and core activation without pain increase so that she can play with her child.         Improve Modified Oswestry to </= 6         Independent with HEP for flexibility and core/hip stability.           Anticipated CPT codes: Gait Training 88402, Therapeutic Exercise 74886, Manual Therapy 09214, Therapeutic Activity 86197, Neuromuscular ReEducation 96828, Self Care/Home Management 41914, Estim Attended 52490, Estim Unattended 33931, and Traction 02615      Assessment/Plan     ASSESSMENT:   Pt reported cont Lx pain, noting it is a little more on the R side but has noticed some rad s/s between her scapulae that will go down into her Lx. She noted no discomfort during exercises. She presented with increased BLE tightness, as well as tightness and tenderness in her B HS regions.     PLAN:   Work on improving Lx mobility, as well as BLE flexibility. Progress to incorporate more functional activities    SIGNATURE: Nirav Shahid PTA, [place] License #: [id number]  Electronically Signed on 4/24/2025        [address]  [place] [address]  [phone]

## 2025-04-29 ENCOUNTER — HOSPITAL ENCOUNTER (OUTPATIENT)
Dept: PHYSICAL THERAPY | Facility: HOSPITAL | Age: 40
Setting detail: THERAPIES SERIES
Discharge: HOME | End: 2025-04-29
Payer: MEDICAID

## 2025-04-29 DIAGNOSIS — M54.50 LUMBAR PAIN: Primary | ICD-10-CM

## 2025-04-29 PROCEDURE — 97140 MANUAL THERAPY 1/> REGIONS: CPT

## 2025-04-29 PROCEDURE — 97110 THERAPEUTIC EXERCISES: CPT

## 2025-04-29 NOTE — THERAPY TREATMENT NOTE
Physical Therapy Treatment Note  [facility]  A department [place] [address], [place] [address]    Patient: Sandy Page                                                 Visit Date: 2025  :     1985    Referring practitioner:    DEBI Valdez  Date of Initial Visit:          Type: THERAPY  Episode: LBP  Number of visits this episode: 3    Visit Diagnoses:    ICD-10-CM ICD-9-CM   1. Lumbar pain  M54.50 724.2     SUBJECTIVE     Subjective: States her back is feeling better, noting she has not complained about it since last week. Notes she is still having the tingling down her spine but no pain with it.      PAIN: 0/10     OBJECTIVE     Objective     Therapeutic Exercises    38553 Units Comments   Pallof press @ cybex L2 X20 ea    Pallof press twists @ cybex L2 X15 ea    Chops @ cybex L3 X15 ea    SB squats @ wall  x20    Straight leg bridges w/ SB 2x10    Bridges + HS curl w/ SB x15    Straight leg glute sets w/ SB 10x3 sec ea    SL clamshells w/ GTB X15 ea    Standing hip EXT w/ 3# 2x10 ea         Timed Minutes 28     Manual Therapy     [id number]  Comments   B HS/piriformis stretches    BLE LAD    STM to L HF    L hip ABD/HF stretches        Timed Minutes 16        Therapy Education/Self Care 33358   Education offered today POC, nature of condition, HEP   Medbride Code [id number]   Ongoing HEP      Date: 2025  Prepared by: Vanessa Weldon     Exercises  - Seated Hamstring Stretch  - 2-3 x daily - 4 x weekly - 4 reps - 30 secs hold  - Supine Bilateral Hip Internal Rotation Stretch  - 2-3 x daily - 4 x weekly - 4 reps - 30 sec hold  - Supine Bridge with Gluteal Set and Spinal Articulation  - 1 x daily - 4 x weekly - 2-3 sets - 10 reps   Timed Minutes         Total Timed Treatment:     44   mins  Total Time of Visit:             44   mins         ASSESSMENT/PLAN     GOALS  Goals                                                  Progress Note  due by 5/16/2025                                                              Recert due by 7/15/2025   LTG by: 12 weeks Comments Date Status   Patient will be able to lift >/= 25 pounds from floor to waist and carry >/= 30 feet with optimal body mechanics and no pain increase so that she can lift her toddler.         Improve glute strength B to >/=4+/5  worked on hip EXT exercises today 4/29 ongoing   Patient to improve bilateral hamstring length to greater than or equal 160 degrees to minimize muscle imbalances for optimal movement.         Reports pain </= for/10 at worst consistently w/ performance of ADL/IADLs, functional mobility, and work-related tasks.         Patient will be able to tolerate sitting in the floor >/= 15 minutes with optimal posture and core activation without pain increase so that she can play with her child.         Improve Modified Oswestry to </= 6         Independent with HEP for flexibility and core/hip stability.           Anticipated CPT codes: Gait Training 65582, Therapeutic Exercise 75305, Manual Therapy 76678, Therapeutic Activity 89228, Neuromuscular ReEducation 07733, Self Care/Home Management 55587, Estim Attended 98332, Estim Unattended 88304, and Traction 20246      Assessment/Plan     ASSESSMENT:   Pt reported no Lx pain today, noting she has been feeling much better but still has the occasional rad s/s down her spine. She noted no discomfort during exercises. She presented with some cont B HS tightness, but felt better following treatment.     PLAN:   Work on improving Lx mobility, as well as BLE flexibility. Progress to incorporate more functional activities    SIGNATURE: Nirav Shahid PTA, [place] License #: [id number]  Electronically Signed on 4/29/2025        [facility] [place] [address]  [phone]

## 2025-05-01 ENCOUNTER — HOSPITAL ENCOUNTER (OUTPATIENT)
Dept: PHYSICAL THERAPY | Facility: HOSPITAL | Age: 40
Setting detail: THERAPIES SERIES
Discharge: HOME | End: 2025-05-01
Payer: MEDICAID

## 2025-05-01 DIAGNOSIS — M54.50 LUMBAR PAIN: Primary | ICD-10-CM

## 2025-05-01 PROCEDURE — 97530 THERAPEUTIC ACTIVITIES: CPT

## 2025-05-01 PROCEDURE — 97110 THERAPEUTIC EXERCISES: CPT

## 2025-05-01 NOTE — THERAPY TREATMENT NOTE
Physical Therapy Treatment Note  [facility] Therapy [facility]  A department [place] [address], [place] [address]    Patient: Sandy Page                                                 Visit Date: 2025  :     1985    Referring practitioner:    DEBI Valdez  Date of Initial Visit:          Type: THERAPY  Episode: LBP  Number of visits this episode: 4    Visit Diagnoses:    ICD-10-CM ICD-9-CM   1. Lumbar pain  M54.50 724.2     SUBJECTIVE     Subjective: Notes her back is alright.       PAIN: 10     OBJECTIVE     Objective     Therapeutic Exercises    51977 Units Comments   Modified julio stretch  Increased pain in low back L side    HL rotation      TA marches  X10 bryce     Bent knee fallout w/ TA bracing  X10 bryce     TA crunches w/ SB x20    APT/PPT on SB X10 each    Bryce hip IR/ER mobility on SB     Timed Minutes 30     Therapeutic Activities    29288 Comments   Bryce HS/piriformis stretch    Assessed lifting mechanics w/ 15# box             Timed Minutes 10         Therapy Education/Self Care 37467   Education offered today POC, nature of condition, HEP   Med[employer]ide Code [id number]   Ongoing HEP      Date: 2025  Prepared by: Vanessa Weldon     Exercises  - Seated Hamstring Stretch  - 2-3 x daily - 4 x weekly - 4 reps - 30 secs hold  - Supine Bilateral Hip Internal Rotation Stretch  - 2-3 x daily - 4 x weekly - 4 reps - 30 sec hold  - Supine Bridge with Gluteal Set and Spinal Articulation  - 1 x daily - 4 x weekly - 2-3 sets - 10 reps   Timed Minutes         Total Timed Treatment:    40    mins  Total Time of Visit:              40  mins         ASSESSMENT/PLAN     GOALS  Goals                                                  Progress Note due by 2025                                                              Recert due by 7/15/2025   LTG by: 12 weeks Comments Date Status   Patient will be able to lift >/= 25 pounds from floor to waist and carry  >/= 30 feet with optimal body mechanics and no pain increase so that she can lift her toddler.  Assessed lifting mechanics   5/1  Ongoing   Improve glute strength B to >/=4+/5  worked on hip EXT exercises today 4/29 ongoing   Patient to improve bilateral hamstring length to greater than or equal 160 degrees to minimize muscle imbalances for optimal movement.  Incorporated hamstring stretching  5/1  Ongoing    Reports pain </= for/10 at worst consistently w/ performance of ADL/IADLs, functional mobility, and work-related tasks.  4/10  5/1  Ongoing    Patient will be able to tolerate sitting in the floor >/= 15 minutes with optimal posture and core activation without pain increase so that she can play with her child.         Improve Modified Oswestry to </= 6         Independent with HEP for flexibility and core/hip stability.           Anticipated CPT codes: Gait Training 76284, Therapeutic Exercise 73176, Manual Therapy 40028, Therapeutic Activity 25218, Neuromuscular ReEducation 39643, Self Care/Home Management 86153, Estim Attended 43480, Estim Unattended 75673, and Traction 49073      Assessment/Plan     ASSESSMENT: Pt exhibits increased pain in the low back with cont tightness in the bryce hamstrings and L hip flexors. Pt had increased pain the L modified julio stretch, but pain went away with SKTC stretch. Incorporated multiple TA bracing activities to increase core stability and lumbar stability. Pt demonstrated increased forward flexion when lifting, corrected with VC and demonstrated correct technique.       PLAN:   Work on improving Lx mobility, as well as BLE flexibility. Progress to incorporate more functional activities    SIGNATURE: Kevan Goddard PTA, [place] License #: [id number]  Electronically Signed on 5/1/2025        [address] [place] [address], [place] [address]  [phone]

## 2025-05-06 ENCOUNTER — HOSPITAL ENCOUNTER (OUTPATIENT)
Dept: PHYSICAL THERAPY | Facility: HOSPITAL | Age: 40
Setting detail: THERAPIES SERIES
Discharge: HOME | End: 2025-05-06
Payer: MEDICAID

## 2025-05-06 DIAGNOSIS — M54.50 LUMBAR PAIN: Primary | ICD-10-CM

## 2025-05-06 PROCEDURE — 97110 THERAPEUTIC EXERCISES: CPT

## 2025-05-06 PROCEDURE — 97530 THERAPEUTIC ACTIVITIES: CPT

## 2025-05-06 NOTE — THERAPY TREATMENT NOTE
Physical Therapy Treatment Note  [facility]  A department [address], [place] [address]    Patient: Sandy Page                                                 Visit Date: 2025  :     1985    Referring practitioner:    DEBI Valdez  Date of Initial Visit:          Type: THERAPY  Episode: LBP  Number of visits this episode: 5    Visit Diagnoses:    ICD-10-CM ICD-9-CM   1. Lumbar pain  M54.50 724.2     SUBJECTIVE     Subjective: Notes the back is alright.       PAIN: 3/10     OBJECTIVE     Objective     Therapeutic Exercises    69896 Units Comments   Bridges w/ ball in between knees  x20    Standing HS stretch @ stairs  2 x 30 sec bryce     Standing hip extension  X20 bryce     Monster slides backwards  20' x 2    Shuttle press w/ 6 bands  2 min     Single leg shuttle press w/ 4 bands  2 min each          Timed Minutes 31     Therapeutic Activities    55674 Comments   Bryce HS/piriformis stretch    STS w/ eccentric control            Timed Minutes 12         Therapy Education/Self Care 36095   Education offered today POC, nature of condition, HEP   Medbride Code [email or web address]   Ongoing HEP      Date: 2025  Prepared by: Vanessa Weldon     Exercises  - Seated Hamstring Stretch  - 2-3 x daily - 4 x weekly - 4 reps - 30 secs hold  - Supine Bilateral Hip Internal Rotation Stretch  - 2-3 x daily - 4 x weekly - 4 reps - 30 sec hold  - Supine Bridge with Gluteal Set and Spinal Articulation  - 1 x daily - 4 x weekly - 2-3 sets - 10 reps   Timed Minutes         Total Timed Treatment:    43    mins  Total Time of Visit:              43  mins         ASSESSMENT/PLAN     GOALS  Goals                                                  Progress Note due by 2025                                                              Recert due by 7/15/2025   LTG by: 12 weeks Comments Date Status   Patient will be able to lift >/= 25 pounds from floor to waist  and carry >/= 30 feet with optimal body mechanics and no pain increase so that she can lift her toddler.  Assessed lifting mechanics   5/1  Ongoing   Improve glute strength B to >/=4+/5  worked on hip EXT exercises today 5/6 ongoing   Patient to improve bilateral hamstring length to greater than or equal 160 degrees to minimize muscle imbalances for optimal movement.  Incorporated hamstring stretching  5/1  Ongoing    Reports pain </= for/10 at worst consistently w/ performance of ADL/IADLs, functional mobility, and work-related tasks.  4/10  5/1  Ongoing    Patient will be able to tolerate sitting in the floor >/= 15 minutes with optimal posture and core activation without pain increase so that she can play with her child.         Improve Modified Oswestry to </= 6         Independent with HEP for flexibility and core/hip stability.           Anticipated CPT codes: Gait Training 65039, Therapeutic Exercise 94390, Manual Therapy 45158, Therapeutic Activity 86313, Neuromuscular ReEducation 46148, Self Care/Home Management 31614, Estim Attended 05970, Estim Unattended 01646, and Traction 29256      Assessment/Plan     ASSESSMENT: Pt exhibits cont pain in the low back, especially with prolonged sitting around the thoracolumbar junction. Pt demonstrates reduced tension in the bryce hamstrings, but has some overall stability deficits due to decreased glute strength. Incorporated multiple extension exercises to further increase ability to bend and lift with reduced difficulty and pain.       PLAN:   Work on improving Lx mobility, as well as BLE flexibility. Progress to incorporate more functional activities    SIGNATURE: Kevan Goddard PTA, [place] License #: [id number]  Electronically Signed on 5/6/2025        [address] [place] [address]  [phone]

## 2025-05-08 ENCOUNTER — HOSPITAL ENCOUNTER (OUTPATIENT)
Dept: PHYSICAL THERAPY | Facility: HOSPITAL | Age: 40
Setting detail: THERAPIES SERIES
Discharge: HOME | End: 2025-05-08
Payer: MEDICAID

## 2025-05-08 DIAGNOSIS — M54.50 LUMBAR PAIN: Primary | ICD-10-CM

## 2025-05-08 PROCEDURE — 97140 MANUAL THERAPY 1/> REGIONS: CPT

## 2025-05-08 PROCEDURE — 97110 THERAPEUTIC EXERCISES: CPT

## 2025-05-08 NOTE — THERAPY TREATMENT NOTE
Physical Therapy Treatment Note  [facility] Therapy [facility]  A department [address], [place] [address]    Patient: Sandy Page                                                 Visit Date: 2025  :     1985    Referring practitioner:    DEBI Valdez  Date of Initial Visit:          Type: THERAPY  Episode: LBP  Number of visits this episode: 6    Visit Diagnoses:    ICD-10-CM ICD-9-CM   1. Lumbar pain  M54.50 724.2     SUBJECTIVE     Subjective: Notes her back is alright, but hasn't been very active.       PAIN: 0/10     OBJECTIVE     Objective     Therapeutic Exercises    30868 Units Comments   Open books  X10 bryce     Supine HS stretch w/ belt  2 x 30 sec.     Box lifts w/ 25# box  x15              Timed Minutes 15     Manual Therapy     05970  Comments   Bryce HS/piriformis stretch    Bryce HS release     IASTM Powerboost Lv 1 bryce hamstrings/lumbar paraspinals             Timed Minutes 25              Therapy Education/Self Care 89970   Education offered today POC, nature of condition, HEP   Med[employer]ide Code [id number]   Ongoing HEP      Date: 2025  Prepared by: Vanessa Weldon     Exercises  - Seated Hamstring Stretch  - 2-3 x daily - 4 x weekly - 4 reps - 30 secs hold  - Supine Bilateral Hip Internal Rotation Stretch  - 2-3 x daily - 4 x weekly - 4 reps - 30 sec hold  - Supine Bridge with Gluteal Set and Spinal Articulation  - 1 x daily - 4 x weekly - 2-3 sets - 10 reps   Timed Minutes         Total Timed Treatment:    40    mins  Total Time of Visit:              40  mins         ASSESSMENT/PLAN     GOALS  Goals                                                  Progress Note due by 2025                                                              Recert due by 7/15/2025   LTG by: 12 weeks Comments Date Status   Patient will be able to lift >/= 25 pounds from floor to waist and carry >/= 30 feet with optimal body mechanics and no pain increase  so that she can lift her toddler.  Assessed lifting mechanics   5/1  Ongoing   Improve glute strength B to >/=4+/5  worked on hip EXT exercises today 5/6 ongoing   Patient to improve bilateral hamstring length to greater than or equal 160 degrees to minimize muscle imbalances for optimal movement.  Incorporated hamstring stretching  5/1  Ongoing    Reports pain </= for/10 at worst consistently w/ performance of ADL/IADLs, functional mobility, and work-related tasks.  4/10  5/1  Ongoing    Patient will be able to tolerate sitting in the floor >/= 15 minutes with optimal posture and core activation without pain increase so that she can play with her child.         Improve Modified Oswestry to </= 6         Independent with HEP for flexibility and core/hip stability.           Anticipated CPT codes: Gait Training 89551, Therapeutic Exercise 08122, Manual Therapy 90313, Therapeutic Activity 47861, Neuromuscular ReEducation 70855, Self Care/Home Management 51885, Estim Attended 11349, Estim Unattended 82834, and Traction 97642      Assessment/Plan     ASSESSMENT: Pt exhibits no pain in the low back, but cont tightness in the bryce hamstrings, incorporated manual techniques to alleviate symptoms. Pt did have some tenderness with palpation in the bryce lumbar paraspinals, decreased after IASTM. Pt exhibited correct mechanics with lifting, but did have some fatigue.       PLAN:   Work on improving Lx mobility, as well as BLE flexibility. Progress to incorporate more functional activities    SIGNATURE: Kevan Goddard PTA, [place] License #: [id number]  Electronically Signed on 5/8/2025        [facility] [place] [address]  [phone]

## 2025-05-13 ENCOUNTER — HOSPITAL ENCOUNTER (OUTPATIENT)
Dept: PHYSICAL THERAPY | Facility: HOSPITAL | Age: 40
Setting detail: THERAPIES SERIES
Discharge: HOME | End: 2025-05-13
Payer: MEDICAID

## 2025-05-13 DIAGNOSIS — M54.50 LUMBAR PAIN: Primary | ICD-10-CM

## 2025-05-13 PROCEDURE — 97140 MANUAL THERAPY 1/> REGIONS: CPT | Performed by: PHYSICAL THERAPIST

## 2025-05-13 PROCEDURE — 97110 THERAPEUTIC EXERCISES: CPT | Performed by: PHYSICAL THERAPIST

## 2025-05-13 NOTE — THERAPY TREATMENT NOTE
" Physical Therapy Treatment Note and 30 Day Progress Note  [facility] Therapy [facility]  A department of [address], [place] [address]    Patient: Sandy Page                                                 Visit Date: 2025  :     1985    Referring practitioner:    DEBI Valdez  Date of Initial Visit:          Type: THERAPY  Episode: LBP  Number of visits this episode: 7    Visit Diagnoses:    ICD-10-CM ICD-9-CM   1. Lumbar pain  M54.50 724.2     SUBJECTIVE     Subjective: She says her back is still hurting more in the thoracic area, particularly after being her feet a long time at work.     PAIN: 1.5/10     OBJECTIVE     Objective     Therapeutic Exercises    06199 Units Comments   Assessed goals     Prone scapular swimmers 3\" ea position x 6 Moved from Is to Ts to Ys with face lifted off mat   Cat/cow 10\" x 5    birddog 10\" x 5 ea    Standing split squat bryce  Used UE support        Timed Minutes 30     Manual Therapy     98253  Comments   Prone thoracic ext mobs Foam roll and manual OP                   Timed Minutes 15        Therapy Education/Self Care 60312   Education offered today POC, nature of condition, HEP   Med[employer]ide Code [id number]   Ongoing HEP      Date: 2025  Prepared by: Vanessa Weldon     Exercises  - Seated Hamstring Stretch  - 2-3 x daily - 4 x weekly - 4 reps - 30 secs hold  - Supine Bilateral Hip Internal Rotation Stretch  - 2-3 x daily - 4 x weekly - 4 reps - 30 sec hold  - Supine Bridge with Gluteal Set and Spinal Articulation  - 1 x daily - 4 x weekly - 2-3 sets - 10 reps   Timed Minutes         Total Timed Treatment:     45    mins  Total Time of Visit:             45  mins         ASSESSMENT/PLAN     GOALS  Goals                                                  Progress Note due by 25                                                              Recert due by 7/15/25   LTG by: 12 weeks Comments Date Status   Patient " will be able to lift >/= 25 pounds from floor to waist and carry >/= 30 feet with optimal body mechanics and no pain increase so that she can lift her toddler. Still with constant pain in back with lifting  5/13  Ongoing   Improve glute strength B to >/=4+/5 Hip abd 4-/5, hip ext 4/5 5/13 ongoing   Patient to improve bilateral hamstring length to greater than or equal 160 degrees to minimize muscle imbalances for optimal movement. 135 deg bryce (hips at 90)  5/13  Ongoing    Reports pain </= for/10 at worst consistently w/ performance of ADL/IADLs, functional mobility, and work-related tasks.  1.5/10 now; up to 8/10 at worst about once a week 5/13  Ongoing    Patient will be able to tolerate sitting in the floor >/= 15 minutes with optimal posture and core activation without pain increase so that she can play with her child.  can't play with him on the floor; has moved his table over and she plays with him there.   5/13  Ongoing    Improve Modified Oswestry to </= 6  16/50  5/13  Ongoing    Independent with HEP for flexibility and core/hip stability.  focus on thoracic ext flexibility and   5/13  Ongoing      Anticipated CPT codes: Gait Training 38576, Therapeutic Exercise 91989, Manual Therapy 45831, Therapeutic Activity 63330, Neuromuscular ReEducation 83695, Self Care/Home Management 14540, Estim Attended 18513, Estim Unattended 39524, and Traction 90416      Assessment & Plan       Assessment  Impairments: abnormal gait, abnormal muscle firing, abnormal muscle tone, abnormal or restricted ROM, activity intolerance, impaired balance, impaired physical strength, lacks appropriate home exercise program, pain with function, safety issue and weight-bearing intolerance   Functional limitations: carrying objects, lifting, sleeping, walking, pulling, pushing, uncomfortable because of pain, moving in bed, sitting, standing, stooping and unable to perform repetitive tasks   Prognosis: good    Plan  Therapy options: will be  seen for skilled therapy services  Planned modality interventions: cryotherapy, dry needling, electrical stimulation/Russian stimulation, TENS, low level laser therapy, thermotherapy (hydrocollator packs), traction and ultrasound  Planned therapy interventions: abdominal trunk stabilization, manual therapy, motor coordination training, ADL retraining, balance/weight-bearing training, neuromuscular re-education, body mechanics training, postural training, fine motor coordination training, soft tissue mobilization, spinal/joint mobilization, flexibility, functional ROM exercises, strengthening, stretching, gait training, home exercise program, therapeutic activities, IADL retraining and joint mobilization  Frequency: 2x week  Duration in weeks: 12  Treatment plan discussed with: patient         ASSESSMENT:   Her thoracic swayback posture tends to keep her spine in a strain constantly. She tends to not use her glutes in standing as she hangs on her ligaments. Today we worked on improving thoracic mobility and waking up her back muscles to move her spine toward more neutral and to find the muscles to support it.. She hasn't met at goals at this point. Her tight hamstrings may be from overusing her hams as she doesn't use her glutes as much for stability and mobility.       PLAN:   Work thoracic ext mobility and core/scapular stability to move spine to neutral. Maintain this neutral position with ergonomic for work.     SIGNATURE: Carlos Chavez, PT, [place] License #: [id number]  Electronically Signed on 5/13/2025        [address] [place] [address] [place]  [place] [address]  [phone]

## 2025-05-15 ENCOUNTER — HOSPITAL ENCOUNTER (OUTPATIENT)
Dept: PHYSICAL THERAPY | Facility: HOSPITAL | Age: 40
Setting detail: THERAPIES SERIES
Discharge: HOME | End: 2025-05-15
Payer: MEDICAID

## 2025-05-15 DIAGNOSIS — M54.50 LUMBAR PAIN: Primary | ICD-10-CM

## 2025-05-15 PROCEDURE — 97110 THERAPEUTIC EXERCISES: CPT

## 2025-05-15 PROCEDURE — 97140 MANUAL THERAPY 1/> REGIONS: CPT

## 2025-05-15 NOTE — THERAPY TREATMENT NOTE
Physical Therapy Treatment Note  [facility] Therapy [facility]  A department [address], [place] [address]    Patient: Sandy Page                                                 Visit Date: 5/15/2025  :     1985    Referring practitioner:    DEBI Valdez  Date of Initial Visit:          Type: THERAPY  Episode: LBP  Number of visits this episode: 8    Visit Diagnoses:    ICD-10-CM ICD-9-CM   1. Lumbar pain  M54.50 724.2     SUBJECTIVE     Subjective: Notes her back is alright      PAIN: 1/10     OBJECTIVE     Objective     Therapeutic Exercises    50772 Units Comments   Bridges on SB  x20    SL hip abduction w/ ISO hold and 3# ankle wt 5 sec x 10 bryce     Shuttle press w/ 6 bands  3 min     Single leg shuttle press w/ 6 bands  2 min each     Side lunges @ TRX  X10 bryce     Squat to SLS march  X10 bryce     Timed Minutes 34     Manual Therapy     75142  Comments   Bryce HS stretch     Bryce HS release                 Timed Minutes 10        Therapy Education/Self Care 83488   Education offered today POC, nature of condition, HEP   Medbride Code [email or web address]   Ongoing HEP      Date: 2025  Prepared by: Vanessa Weldon     Exercises  - Seated Hamstring Stretch  - 2-3 x daily - 4 x weekly - 4 reps - 30 secs hold  - Supine Bilateral Hip Internal Rotation Stretch  - 2-3 x daily - 4 x weekly - 4 reps - 30 sec hold  - Supine Bridge with Gluteal Set and Spinal Articulation  - 1 x daily - 4 x weekly - 2-3 sets - 10 reps   Timed Minutes         Total Timed Treatment:     44    mins  Total Time of Visit:             44  mins         ASSESSMENT/PLAN     GOALS  Goals                                                  Progress Note due by 25                                                              Recert due by 7/15/25   LTG by: 12 weeks Comments Date Status   Patient will be able to lift >/= 25 pounds from floor to waist and carry >/= 30 feet with optimal body  mechanics and no pain increase so that she can lift her toddler. Still with constant pain in back with lifting  5/13  Ongoing   Improve glute strength B to >/=4+/5 Hip abd 4-/5, hip ext 4/5 5/13 ongoing   Patient to improve bilateral hamstring length to greater than or equal 160 degrees to minimize muscle imbalances for optimal movement. 135 deg bryce (hips at 90)  5/13  Ongoing    Reports pain </= for/10 at worst consistently w/ performance of ADL/IADLs, functional mobility, and work-related tasks.  1.5/10 now; up to 8/10 at worst about once a week 5/13  Ongoing    Patient will be able to tolerate sitting in the floor >/= 15 minutes with optimal posture and core activation without pain increase so that she can play with her child.  can't play with him on the floor; has moved his table over and she plays with him there.   5/13  Ongoing    Improve Modified Oswestry to </= 6  16/50  5/13  Ongoing    Independent with HEP for flexibility and core/hip stability.  focus on thoracic ext flexibility and   5/13  Ongoing      Anticipated CPT codes: Gait Training 81409, Therapeutic Exercise 20245, Manual Therapy 79068, Therapeutic Activity 54711, Neuromuscular ReEducation 62693, Self Care/Home Management 12611, Estim Attended 12539, Estim Unattended 33688, and Traction 51767      Assessment/Plan     ASSESSMENT: Pt exhibits decreased pain in the low back. Focus treatment to further increase strength in her glutes to improve functional activities. Pt still demonstrates decreased extensibility in the bryce hamstrings, incorporated manual techniques to decreased tightness/stiffness.     PLAN:   Work thoracic ext mobility and core/scapular stability to move spine to neutral. Maintain this neutral position with ergonomic for work.     SIGNATURE: Kevan Goddard PTA, [place] License #: [id number]  Electronically Signed on 5/15/2025        [facility] [place] [address]  [phone]

## 2025-05-22 ENCOUNTER — HOSPITAL ENCOUNTER (OUTPATIENT)
Dept: PHYSICAL THERAPY | Facility: HOSPITAL | Age: 40
Setting detail: THERAPIES SERIES
Discharge: HOME | End: 2025-05-22
Payer: MEDICAID

## 2025-05-22 DIAGNOSIS — M54.50 LUMBAR PAIN: Primary | ICD-10-CM

## 2025-05-22 PROCEDURE — 97140 MANUAL THERAPY 1/> REGIONS: CPT

## 2025-05-22 PROCEDURE — 97110 THERAPEUTIC EXERCISES: CPT

## 2025-05-22 NOTE — THERAPY TREATMENT NOTE
Physical Therapy Treatment Note  [facility]  [address], [place] [address]    Patient: Sandy Page                                                 Visit Date: 2025  :     1985    Referring practitioner:    DEBI Valdez  Date of Initial Visit:          Type: THERAPY  Episode: LBP  Number of visits this episode: 9    Visit Diagnoses:    ICD-10-CM ICD-9-CM   1. Lumbar pain  M54.50 724.2     SUBJECTIVE     Subjective: Notes she is feeling alright today. Had some increased pain yesterday.     PAIN: 0/10     OBJECTIVE     Objective     Therapeutic Exercises    78235 Units Comments   Progressed HEP  See below, all exercises w/ GTB              25# box lift and carry  30' x 2 Assessed mechanics              Timed Minutes 28     Manual Therapy     17621  Comments   Bryce HS stretch     Contract/relax w/ IASTM powerboost Lv 1 bryce HS Curved attachment        Timed Minutes 15        Therapy Education/Self Care 35997   Education offered today    Medbride Code    Ongoing HEP    Access Code: [email or web address]  URL: [email or web address]  Date: 2025  Prepared by: Kevan Goddard    Exercises  - Figure 4 Bridge  - 1 x daily - 7 x weekly - 3 sets - 10 reps  - Sidelying Hip Abduction  - 1 x daily - 7 x weekly - 3 sets - 10 reps  - Sit to Stand  - 1 x daily - 7 x weekly - 3 sets - 10 reps  - Hip Extension with Resistance Loop  - 1 x daily - 7 x weekly - 3 sets - 10 reps  - Hip Abduction with Resistance Loop  - 1 x daily - 7 x weekly - 3 sets - 10 reps  - Seated Hamstring Stretch  - 1 x daily - 7 x weekly - 3 sets - 10 reps  - Supine Hip Extension on Bench  - 1 x daily - 7 x weekly - 3 sets - 10 reps     Timed Minutes         Total Timed Treatment:     43    mins  Total Time of Visit:             43  mins         ASSESSMENT/PLAN     GOALS  Goals                                                  Progress Note due by 25                                                               Recert due by 7/15/25   LTG by: 12 weeks Comments Date Status   Patient will be able to lift >/= 25 pounds from floor to waist and carry >/= 30 feet with optimal body mechanics and no pain increase so that she can lift her toddler. Able to lift and carry 25# with very little pain increase, 1/10  5/22  Progressing    Improve glute strength B to >/=4+/5 Hip abd 4-/5, hip ext 4/5 5/13 ongoing   Patient to improve bilateral hamstring length to greater than or equal 160 degrees to minimize muscle imbalances for optimal movement. 135 deg bryce (hips at 90)  5/13  Ongoing    Reports pain </= for/10 at worst consistently w/ performance of ADL/IADLs, functional mobility, and work-related tasks.  0/10 now; up to 7/10 at worst about once a week 5/22  Ongoing    Patient will be able to tolerate sitting in the floor >/= 15 minutes with optimal posture and core activation without pain increase so that she can play with her child.  can't play with him on the floor; has moved his table over and she plays with him there.   5/13  Ongoing    Improve Modified Oswestry to </= 6  16/50  5/13  Ongoing    Independent with HEP for flexibility and core/hip stability.  focus on thoracic ext flexibility and   5/13  Ongoing      Anticipated CPT codes: Gait Training 62438, Therapeutic Exercise 74097, Manual Therapy 44640, Therapeutic Activity 87499, Neuromuscular ReEducation 00289, Self Care/Home Management 47620, Estim Attended 17380, Estim Unattended 31757, and Traction 13159      Assessment/Plan     ASSESSMENT: Pt exhibits no pain on the date in the low back, but does have the occasional increase in pain in the mid thoracic region. Pt still demonstrates decreased strength in the glutes, progressed HEP to further improve her functional ability. Pt demonstrates proper lifting form and able to carry required weight with a slight pain in the mid thoracic spine.    PLAN:   Work thoracic ext  mobility and core/scapular stability to move spine to neutral. Maintain this neutral position with ergonomic for work. Decrease frequency to 1x/week due to progress.     SIGNATURE: Kevan Goddard PTA, [place] License #: [id number]  Electronically Signed on 5/22/2025        [address] [facility] [address]  [phone]

## 2025-05-27 ENCOUNTER — APPOINTMENT (OUTPATIENT)
Dept: PHYSICAL THERAPY | Facility: HOSPITAL | Age: 40
End: 2025-05-27
Payer: MEDICAID

## 2025-05-27 ENCOUNTER — OFFICE VISIT (OUTPATIENT)
Dept: NEUROSURGERY | Facility: CLINIC | Age: 40
End: 2025-05-27
Payer: MEDICAID

## 2025-05-27 VITALS — WEIGHT: 129 LBS | BODY MASS INDEX: 22.02 KG/M2 | HEIGHT: 64 IN

## 2025-05-27 DIAGNOSIS — G89.29 CHRONIC BILATERAL LOW BACK PAIN WITHOUT SCIATICA: Primary | ICD-10-CM

## 2025-05-27 DIAGNOSIS — M54.50 CHRONIC BILATERAL LOW BACK PAIN WITHOUT SCIATICA: Primary | ICD-10-CM

## 2025-05-27 PROCEDURE — 99213 OFFICE O/P EST LOW 20 MIN: CPT | Performed by: NURSE PRACTITIONER

## 2025-05-27 PROCEDURE — 1159F MED LIST DOCD IN RCRD: CPT | Performed by: NURSE PRACTITIONER

## 2025-05-27 PROCEDURE — 1160F RVW MEDS BY RX/DR IN RCRD: CPT | Performed by: NURSE PRACTITIONER

## 2025-05-27 RX ORDER — BUPROPION HYDROCHLORIDE 300 MG/1
300 TABLET ORAL DAILY
COMMUNITY

## 2025-05-27 NOTE — PROGRESS NOTES
"    Chief complaint:   Chief Complaint   Patient presents with    Back Pain     Pt is here for 8-10f/u. Pt states since her last office visit her symptoms have not improved. Pt states she is currently in physical therapy.         Subjective     HPI: This is a 40-year-old female patient who we have been following for benign intracranial hypertension.  The patient did have a shunt initially placed in 2013.  Her symptoms overall have been stable.  Her shunt is set at 1.5.  The patient was last seen in April 2025 and at that time was complaining of back pain.  The pain in her back is constant.  It is worse with walking and better with changing position.  She is not complaining of any lower extremity pain or numbness and tingling.  We did send the patient for x-rays of the lumbar spine as well as a dedicated course of physical therapy and started her on diclofenac.  The patient was able to start into physical therapy on April 16, 2025 and has been working with them since that time.  The patient unfortunately has not made any improvement.  She is continue to complain of back pain and some numbness but denies any lower extremity radicular pain.  She is also been taking the diclofenac with some improvement.    Review of Systems   Musculoskeletal:  Positive for arthralgias, back pain and myalgias.   Neurological: Negative.          Objective      Vital Signs  Ht 162 cm (63.78\")   Wt 58.5 kg (129 lb)   BMI 22.30 kg/m²     Physical Exam  Constitutional:       General: She is awake.      Appearance: She is well-developed.   HENT:      Head: Normocephalic.   Eyes:      Extraocular Movements: Extraocular movements intact.      Pupils: Pupils are equal, round, and reactive to light.   Pulmonary:      Effort: Pulmonary effort is normal.   Musculoskeletal:         General: Normal range of motion.      Cervical back: Normal range of motion.   Skin:     General: Skin is warm.   Neurological:      Mental Status: She is alert and " oriented to person, place, and time.      GCS: GCS eye subscore is 4. GCS verbal subscore is 5. GCS motor subscore is 6.      Cranial Nerves: No cranial nerve deficit.      Sensory: No sensory deficit.      Motor: Motor strength is normal.     Gait: Gait normal.      Deep Tendon Reflexes: Reflexes are normal and symmetric.   Psychiatric:         Speech: Speech normal.         Behavior: Behavior normal.         Thought Content: Thought content normal.         Neurological Exam  Mental Status  Awake and alert. Oriented to person, place and time. Oriented to person, place, and time. Speech is normal. Language is fluent with no aphasia. Attention and concentration are normal.    Cranial Nerves  CN I: Sense of smell is normal.  CN II: Right normal visual field. Left normal visual field.  CN III, IV, VI: Extraocular movements intact bilaterally. Pupils equal round and reactive to light bilaterally.  CN V: Facial sensation is normal.  CN VII:  Right: There is no facial weakness.  Left: There is no facial weakness.  CN XI: Shoulder shrug strength is normal.  CN XII: Tongue midline without atrophy or fasciculations.    Motor  Normal muscle bulk throughout. Normal muscle tone. Strength is 5/5 throughout all four extremities.    Sensory  Sensation is intact to light touch, pinprick, vibration and proprioception in all four extremities.    Reflexes  Deep tendon reflexes are 2+ and symmetric in all four extremities.    Gait  Normal casual, toe, heel and tandem gait. Normal gait.      Imaging review: X-ray of the lumbar spine shows mild disc degeneration of L5-S1.  No abnormal motion noted in flexion or extension.        Scoliosis films does not show any significant abnormality.        Assessment/Plan: The patient is complaining of back pain.  She does have mild disc degeneration.  Would have her go for an MRI of the lumbar spine.  Her shunt was checked and was shown to be at 1.5.  She will need to have the shunt checked post MRI  as well.  We will see her back in the office with Dr. Garcia after the MRI is completed and make further recommendation at that time.  Her questions and concerns were addressed.    Patient is a nonsmoker  The patient's Body mass index is 22.3 kg/m².. BMI is within normal parameters. No follow-up required.    Diagnoses and all orders for this visit:    1. Chronic bilateral low back pain without sciatica (Primary)  -     MRI Lumbar Spine Without Contrast; Future        I discussed the patients findings and my recommendations with patient  Jefferson Hatch, DEBI  05/27/25  10:19 CDT

## 2025-05-29 ENCOUNTER — HOSPITAL ENCOUNTER (OUTPATIENT)
Dept: PHYSICAL THERAPY | Facility: HOSPITAL | Age: 40
Setting detail: THERAPIES SERIES
Discharge: HOME | End: 2025-05-29
Payer: MEDICAID

## 2025-05-29 DIAGNOSIS — M54.50 LUMBAR PAIN: Primary | ICD-10-CM

## 2025-05-29 PROCEDURE — 97110 THERAPEUTIC EXERCISES: CPT

## 2025-05-29 PROCEDURE — 97140 MANUAL THERAPY 1/> REGIONS: CPT

## 2025-05-29 NOTE — THERAPY TREATMENT NOTE
Physical Therapy Treatment Note  [facility]  A department [place] [address], [place] [address]    Patient: Sandy Page                                                 Visit Date: 2025  :     1985    Referring practitioner:    DEBI Valdez  Date of Initial Visit:          Type: THERAPY  Episode: LBP  Number of visits this episode: 10    Visit Diagnoses:    ICD-10-CM ICD-9-CM   1. Lumbar pain  M54.50 724.2     SUBJECTIVE     Subjective: Notes she is alright. Still has pain in the mid back.     PAIN: /10 mid thoracic spine      OBJECTIVE     Objective     Therapeutic Exercises    18531 Units Comments   Open books X10 bryce     Forward/backward/side stepping w/ TRX on incline/decline 30' each    Single leg bridges  X10 bryce          Timed Minutes 17     Manual Therapy     79184  Comments   IASTM Powerboost Lv 1 lower thoracic spine     STM Powerboost Lv 1 lower thoracic spine     IASTM mid thoracic parapsinals w/ blue foam roller in prone     PA mob lower thoracic  Increased pain    Timed Minutes 25        Therapy Education/Self Care 00840   Education offered today    [employer] Code    Ongoing HEP    Access Code: [email or web address]  URL: [email or web address]  Date: 2025  Prepared by: Kevan Goddard    Exercises  - Figure 4 Bridge  - 1 x daily - 7 x weekly - 3 sets - 10 reps  - Sidelying Hip Abduction  - 1 x daily - 7 x weekly - 3 sets - 10 reps  - Sit to Stand  - 1 x daily - 7 x weekly - 3 sets - 10 reps  - Hip Extension with Resistance Loop  - 1 x daily - 7 x weekly - 3 sets - 10 reps  - Hip Abduction with Resistance Loop  - 1 x daily - 7 x weekly - 3 sets - 10 reps  - Seated Hamstring Stretch  - 1 x daily - 7 x weekly - 3 sets - 10 reps  - Supine Hip Extension on Bench  - 1 x daily - 7 x weekly - 3 sets - 10 reps     Timed Minutes         Total Timed Treatment:     42    mins  Total Time of Visit:             42  mins          ASSESSMENT/PLAN     GOALS  Goals                                                  Progress Note due by 6/12/25                                                              Recert due by 7/15/25   LTG by: 12 weeks Comments Date Status   Patient will be able to lift >/= 25 pounds from floor to waist and carry >/= 30 feet with optimal body mechanics and no pain increase so that she can lift her toddler. Able to lift and carry 25# with very little pain increase, 1/10  5/22  Progressing    Improve glute strength B to >/=4+/5 Hip abd 4-/5, hip ext 4/5 5/13 ongoing   Patient to improve bilateral hamstring length to greater than or equal 160 degrees to minimize muscle imbalances for optimal movement. 135 deg bryce (hips at 90)  5/13  Ongoing    Reports pain </= 4/10 at worst consistently w/ performance of ADL/IADLs, functional mobility, and work-related tasks.  0/10 now; up to 7/10 at worst about once a week 5/22  Ongoing    Patient will be able to tolerate sitting in the floor >/= 15 minutes with optimal posture and core activation without pain increase so that she can play with her child.  can't play with him on the floor; has moved his table over and she plays with him there.   5/13  Ongoing    Improve Modified Oswestry to </= 6  16/50  5/13  Ongoing    Independent with HEP for flexibility and core/hip stability.  focus on thoracic ext flexibility and   5/13  Ongoing      Anticipated CPT codes: Gait Training 87020, Therapeutic Exercise 32179, Manual Therapy 80312, Therapeutic Activity 60742, Neuromuscular ReEducation 00535, Self Care/Home Management 46691, Estim Attended 30554, Estim Unattended 65428, and Traction 68170      Assessment/Plan     ASSESSMENT: Pt exhibits increased pain in the mid/lower thoracic spine and tenderness with palpation in the paraspinals, incorporated manual techniques to alleviate symptoms. Pt had more difficulty side stepping to the L compared to the R.     PLAN:   Work thoracic ext mobility  and core/scapular stability to move spine to neutral. Maintain this neutral position with ergonomic for work.     SIGNATURE: Kevan Goddard PTA, [place] License #: [id number]  Electronically Signed on 5/29/2025        [address]  [place] [address]  [phone]

## 2025-06-03 DIAGNOSIS — M54.6 CHRONIC MIDLINE THORACIC BACK PAIN: Primary | ICD-10-CM

## 2025-06-03 DIAGNOSIS — G89.29 CHRONIC MIDLINE THORACIC BACK PAIN: Primary | ICD-10-CM

## 2025-06-07 ENCOUNTER — HOSPITAL ENCOUNTER (EMERGENCY)
Age: 40
Discharge: HOME | End: 2025-06-07
Attending: STUDENT IN AN ORGANIZED HEALTH CARE EDUCATION/TRAINING PROGRAM
Payer: MEDICAID

## 2025-06-07 ENCOUNTER — APPOINTMENT (OUTPATIENT)
Dept: GENERAL RADIOLOGY | Age: 40
End: 2025-06-07
Payer: MEDICAID

## 2025-06-07 VITALS
OXYGEN SATURATION: 100 % | HEIGHT: 64 IN | SYSTOLIC BLOOD PRESSURE: 112 MMHG | WEIGHT: 125 LBS | RESPIRATION RATE: 18 BRPM | HEART RATE: 99 BPM | DIASTOLIC BLOOD PRESSURE: 82 MMHG | BODY MASS INDEX: 21.34 KG/M2 | TEMPERATURE: 97.5 F

## 2025-06-07 DIAGNOSIS — S63.501A SPRAIN OF RIGHT WRIST, INITIAL ENCOUNTER: Primary | ICD-10-CM

## 2025-06-07 PROCEDURE — 73110 X-RAY EXAM OF WRIST: CPT

## 2025-06-07 PROCEDURE — 99283 EMERGENCY DEPT VISIT LOW MDM: CPT

## 2025-06-07 ASSESSMENT — PAIN DESCRIPTION - LOCATION: LOCATION: WRIST

## 2025-06-07 ASSESSMENT — PAIN - FUNCTIONAL ASSESSMENT: PAIN_FUNCTIONAL_ASSESSMENT: 0-10

## 2025-06-07 ASSESSMENT — PAIN DESCRIPTION - ORIENTATION: ORIENTATION: RIGHT

## 2025-06-07 ASSESSMENT — PAIN SCALES - GENERAL
PAINLEVEL_OUTOF10: 3
PAINLEVEL_OUTOF10: 3

## 2025-06-07 ASSESSMENT — PAIN DESCRIPTION - PAIN TYPE: TYPE: ACUTE PAIN

## 2025-06-08 NOTE — DISCHARGE INSTRUCTIONS
Use a removable wrist splint to help with your symptoms.  Follow-up with your doctor for any continued symptoms.  You can wear the wrist splint at night.  You may do any activity as tolerated.

## 2025-06-08 NOTE — ED PROVIDER NOTES
[facility] EMERGENCY DEPARTMENT  eMERGENCY dEPARTMENT eNCOUnter      Pt Name: Chikis Mcnamara  MRN: [id number]  Birthdate 1985  Date of evaluation: 6/7/2025  Provider: Peña Dover MD    Chief Complaint:  Chief Complaint   Patient presents with    Wrist Pain     Pt has pain in right wrist. When asked how she injured her wrist she responded. \"I was trying to beat the high score on the video game\"     HPI    Chikis Mcnamara is a 40 y.o. female who presents to the emergency department due to wrist sprain.  Right wrist.  She was playing a video game repetitively pressing a button whenever she felt pain in her wrist.  She has a slight amount of swelling on the radial aspect of her right distal forearm proximal to the wrist.  No other symptoms today.  No other acute injuries      Review of Systems   Constitutional:  Negative for fever.   Respiratory:  Negative for shortness of breath.    Cardiovascular:  Negative for chest pain.   Gastrointestinal:  Negative for nausea and vomiting.   Genitourinary:  Negative for difficulty urinating and dysuria.   Neurological:  Negative for syncope.       Past Medical History:   Diagnosis Date    Fatigue     Fever     GERD (gastroesophageal reflux disease)     Gum disease     Headaches, cluster     Joint ache     Nausea & vomiting     PONV (postoperative nausea and vomiting)     AFTER THE  SHUNT    Pseudotumor cerebri     sees dr. maria in [facility]    Renal calculus, right 3/4/2018    Weight gain        Past Surgical History:   Procedure Laterality Date    ABDOMEN SURGERY      BREAST ENHANCEMENT SURGERY      left breast implant    CHOLECYSTECTOMY      COLONOSCOPY      LITHOTRIPSY Right 7/7/2016    CYSTOSCOPY RETROGRADE PYELOGRAM LASER LITHOTRIPSY performed by Wood Adhikari MD at [facility] OR    OR LITHOTRIPSY XTRCORP SHOCK WAVE Right 3/27/2018    ESWL EXTRACORPEAL SHOCK WAVE LITHOTRIPSY performed by Wood Adhikari MD at [facility] OR    UPPER GASTROINTESTINAL ENDOSCOPY  2005

## 2025-06-16 ENCOUNTER — HOSPITAL ENCOUNTER (EMERGENCY)
Facility: HOSPITAL | Age: 40
Discharge: HOME | End: 2025-06-16
Admitting: FAMILY MEDICINE
Payer: MEDICAID

## 2025-06-16 VITALS
DIASTOLIC BLOOD PRESSURE: 73 MMHG | RESPIRATION RATE: 20 BRPM | BODY MASS INDEX: 21.08 KG/M2 | SYSTOLIC BLOOD PRESSURE: 102 MMHG | TEMPERATURE: 98.3 F | HEART RATE: 82 BPM | HEIGHT: 64 IN | WEIGHT: 123.5 LBS | OXYGEN SATURATION: 93 %

## 2025-06-16 DIAGNOSIS — N81.4 UTERINE PROLAPSE: Primary | ICD-10-CM

## 2025-06-16 LAB
BACTERIA UR QL AUTO: ABNORMAL /HPF
BILIRUB UR QL STRIP: NEGATIVE
CLARITY UR: CLEAR
COLOR UR: YELLOW
GLUCOSE UR STRIP-MCNC: NEGATIVE MG/DL
HGB UR QL STRIP.AUTO: NEGATIVE
HOLD SPECIMEN: NORMAL
HYALINE CASTS UR QL AUTO: ABNORMAL /LPF
KETONES UR QL STRIP: ABNORMAL
LEUKOCYTE ESTERASE UR QL STRIP.AUTO: ABNORMAL
MUCOUS THREADS URNS QL MICRO: ABNORMAL /HPF
NITRITE UR QL STRIP: NEGATIVE
PH UR STRIP.AUTO: 6 [PH] (ref 5–8)
PROT UR QL STRIP: ABNORMAL
RBC # UR STRIP: ABNORMAL /HPF
REF LAB TEST METHOD: ABNORMAL
SP GR UR STRIP: 1.02 (ref 1–1.03)
SQUAMOUS #/AREA URNS HPF: ABNORMAL /HPF
UROBILINOGEN UR QL STRIP: ABNORMAL
WBC # UR STRIP: ABNORMAL /HPF
WHOLE BLOOD HOLD COAG: NORMAL
WHOLE BLOOD HOLD SPECIMEN: NORMAL

## 2025-06-16 PROCEDURE — 81001 URINALYSIS AUTO W/SCOPE: CPT | Performed by: PHYSICIAN ASSISTANT

## 2025-06-16 PROCEDURE — 87086 URINE CULTURE/COLONY COUNT: CPT | Performed by: PHYSICIAN ASSISTANT

## 2025-06-16 PROCEDURE — 99284 EMERGENCY DEPT VISIT MOD MDM: CPT
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
## 2025-06-16 NOTE — DISCHARGE INSTRUCTIONS
Call [facility] on the number above to schedule follow-up appointment.  Continue Kegel exercises.  It is recommended that you take some Metamucil to keep bowels normal and no hard stools.  If condition worsens, return to ER immediately for evaluation.  The urinalysis will be sent off for culture and the results should be back in 3 days.

## 2025-06-17 ENCOUNTER — TELEPHONE (OUTPATIENT)
Dept: OBSTETRICS AND GYNECOLOGY | Age: 40
End: 2025-06-17
Payer: MEDICAID

## 2025-06-17 NOTE — TELEPHONE ENCOUNTER
Hub staff attempted to follow warm transfer process and was unsuccessful     Caller: Sandy Page    Relationship to patient: Self    Best call back number: [phone]      Patient is needing: PATIENT WAS SEEN IN ED LAST NIGHT FOR UTERINE PROLAPSE. ER ADVISED PATIENT TO CALL OFFICE TO SCHEDULE APPOINTMENT WITH DR MALDONADO. PATIENT IS ESTABLISHED WITH DR BROWNE.     [facility] WAS UNABLE TO SCHEDULE WITHIN 2 DAY URGENT TIME FRAME SCHEDULING. [facility] ATTEMPTED TO WARM TRANSFER TO  FOR SCHEDULE AND WAS SENT TO CLINICAL.

## 2025-06-18 ENCOUNTER — APPOINTMENT (OUTPATIENT)
Dept: CT IMAGING | Age: 40
End: 2025-06-18
Payer: MEDICAID

## 2025-06-18 ENCOUNTER — OFFICE VISIT (OUTPATIENT)
Dept: OBGYN CLINIC | Age: 40
End: 2025-06-18
Payer: MEDICAID

## 2025-06-18 ENCOUNTER — APPOINTMENT (OUTPATIENT)
Dept: GENERAL RADIOLOGY | Age: 40
End: 2025-06-18
Payer: MEDICAID

## 2025-06-18 ENCOUNTER — HOSPITAL ENCOUNTER (INPATIENT)
Age: 40
LOS: 2 days | Discharge: HOME | End: 2025-06-20
Attending: HOSPITALIST | Admitting: HOSPITALIST
Payer: MEDICAID

## 2025-06-18 VITALS
BODY MASS INDEX: 21 KG/M2 | HEART RATE: 103 BPM | WEIGHT: 123 LBS | DIASTOLIC BLOOD PRESSURE: 80 MMHG | SYSTOLIC BLOOD PRESSURE: 123 MMHG | HEIGHT: 64 IN

## 2025-06-18 DIAGNOSIS — N20.0 RENAL CALCULUS, RIGHT: ICD-10-CM

## 2025-06-18 DIAGNOSIS — N81.4 CYSTOCELE WITH UTERINE PROLAPSE: ICD-10-CM

## 2025-06-18 DIAGNOSIS — N23 RENAL COLIC ON RIGHT SIDE: ICD-10-CM

## 2025-06-18 DIAGNOSIS — N20.1 URETEROLITHIASIS: Primary | ICD-10-CM

## 2025-06-18 DIAGNOSIS — Z01.419 WELL WOMAN EXAM: Primary | ICD-10-CM

## 2025-06-18 DIAGNOSIS — Z12.4 SCREENING FOR CERVICAL CANCER: ICD-10-CM

## 2025-06-18 DIAGNOSIS — N94.6 DYSMENORRHEA: ICD-10-CM

## 2025-06-18 DIAGNOSIS — R10.2 PELVIC PAIN: ICD-10-CM

## 2025-06-18 DIAGNOSIS — Z11.51 SCREENING FOR HPV (HUMAN PAPILLOMAVIRUS): ICD-10-CM

## 2025-06-18 PROBLEM — N39.0 UTI (URINARY TRACT INFECTION): Status: ACTIVE | Noted: 2025-06-18

## 2025-06-18 PROBLEM — R52 INTRACTABLE PAIN: Status: ACTIVE | Noted: 2025-06-18

## 2025-06-18 LAB
ALBUMIN SERPL-MCNC: 4.7 G/DL (ref 3.5–5.2)
ALP SERPL-CCNC: 78 U/L (ref 35–104)
ALT SERPL-CCNC: 19 U/L (ref 10–35)
ANION GAP SERPL CALCULATED.3IONS-SCNC: 16 MMOL/L (ref 8–16)
AST SERPL-CCNC: 19 U/L (ref 10–35)
BACTERIA #/AREA URNS HPF: ABNORMAL /HPF
BACTERIA SPEC AEROBE CULT: NO GROWTH
BASOPHILS # BLD: 0 K/UL (ref 0–0.2)
BASOPHILS NFR BLD: 0.3 % (ref 0–1)
BILIRUB SERPL-MCNC: 1.2 MG/DL (ref 0.2–1.2)
BILIRUB UR QL STRIP: NEGATIVE
BUN SERPL-MCNC: 8 MG/DL (ref 6–20)
CALCIUM SERPL-MCNC: 9.8 MG/DL (ref 8.6–10)
CHLORIDE SERPL-SCNC: 103 MMOL/L (ref 98–107)
CLARITY UR: CLEAR
CO2 SERPL-SCNC: 20 MMOL/L (ref 22–29)
COLOR UR: YELLOW
CREAT SERPL-MCNC: 0.8 MG/DL (ref 0.5–0.9)
EOSINOPHIL # BLD: 0.1 K/UL (ref 0–0.6)
EOSINOPHIL NFR BLD: 1.2 % (ref 0–5)
ERYTHROCYTE [DISTWIDTH] IN BLOOD BY AUTOMATED COUNT: 13.1 % (ref 11.5–14.5)
GLUCOSE BLD-MCNC: 80 MG/DL (ref 70–99)
GLUCOSE SERPL-MCNC: 100 MG/DL (ref 70–99)
GLUCOSE UR STRIP.AUTO-MCNC: NEGATIVE MG/DL
HCG SERPL QL: NEGATIVE
HCT VFR BLD AUTO: 40.4 % (ref 37–47)
HGB BLD-MCNC: 15.1 G/DL (ref 12–16)
HGB UR STRIP.AUTO-MCNC: ABNORMAL MG/L
HPV16+18+H RISK 12 DNA SPEC-IMP: NORMAL
IMM GRANULOCYTES # BLD: 0.1 K/UL
KETONES UR STRIP.AUTO-MCNC: ABNORMAL MG/DL
LACTATE BLDV-SCNC: 1.6 MMOL/L (ref 0.5–1.9)
LACTATE BLDV-SCNC: 2.1 MG/DL (ref 0.5–1.9)
LEUKOCYTE ESTERASE UR QL STRIP.AUTO: ABNORMAL
LIPASE SERPL-CCNC: 27 U/L (ref 13–60)
LYMPHOCYTES # BLD: 2.3 K/UL (ref 1.1–4.5)
LYMPHOCYTES NFR BLD: 18.7 % (ref 20–40)
MCH RBC QN AUTO: 32.1 PG (ref 27–31)
MCHC RBC AUTO-ENTMCNC: 37.4 G/DL (ref 33–37)
MCV RBC AUTO: 86 FL (ref 81–99)
MONOCYTES # BLD: 0.6 K/UL (ref 0–0.9)
MONOCYTES NFR BLD: 5.1 % (ref 0–10)
NEUTROPHILS # BLD: 9 K/UL (ref 1.5–7.5)
NEUTS SEG NFR BLD: 74.2 % (ref 50–65)
NITRITE UR QL STRIP.AUTO: NEGATIVE
PERFORMED ON: NORMAL
PH UR STRIP.AUTO: 6 [PH] (ref 5–8)
PLATELET # BLD AUTO: 235 K/UL (ref 130–400)
PMV BLD AUTO: 11.7 FL (ref 9.4–12.3)
POTASSIUM SERPL-SCNC: 4.2 MMOL/L (ref 3.5–5.1)
PROT SERPL-MCNC: 7.5 G/DL (ref 6.4–8.3)
PROT UR STRIP.AUTO-MCNC: 30 MG/DL
RBC # BLD AUTO: 4.7 M/UL (ref 4.2–5.4)
RBC #/AREA URNS HPF: ABNORMAL /HPF (ref 0–2)
SODIUM SERPL-SCNC: 139 MMOL/L (ref 136–145)
SP GR UR STRIP.AUTO: 1.02 (ref 1–1.03)
SQUAMOUS #/AREA URNS HPF: ABNORMAL /HPF
UROBILINOGEN UR STRIP.AUTO-MCNC: 0.2 E.U./DL
WBC # BLD AUTO: 12.1 K/UL (ref 4.8–10.8)
WBC #/AREA URNS HPF: ABNORMAL /HPF (ref 0–5)

## 2025-06-18 PROCEDURE — 83605 ASSAY OF LACTIC ACID: CPT

## 2025-06-18 PROCEDURE — 85025 COMPLETE CBC W/AUTO DIFF WBC: CPT

## 2025-06-18 PROCEDURE — 2580000003 HC RX 258: Performed by: NURSE PRACTITIONER

## 2025-06-18 PROCEDURE — 2580000003 HC RX 258: Performed by: UROLOGY

## 2025-06-18 PROCEDURE — 99285 EMERGENCY DEPT VISIT HI MDM: CPT

## 2025-06-18 PROCEDURE — 6360000002 HC RX W HCPCS: Performed by: NURSE PRACTITIONER

## 2025-06-18 PROCEDURE — 82962 GLUCOSE BLOOD TEST: CPT

## 2025-06-18 PROCEDURE — 36415 COLL VENOUS BLD VENIPUNCTURE: CPT

## 2025-06-18 PROCEDURE — 99222 1ST HOSP IP/OBS MODERATE 55: CPT | Performed by: UROLOGY

## 2025-06-18 PROCEDURE — 83690 ASSAY OF LIPASE: CPT

## 2025-06-18 PROCEDURE — 84703 CHORIONIC GONADOTROPIN ASSAY: CPT

## 2025-06-18 PROCEDURE — 80053 COMPREHEN METABOLIC PANEL: CPT

## 2025-06-18 PROCEDURE — 81001 URINALYSIS AUTO W/SCOPE: CPT

## 2025-06-18 PROCEDURE — 0TF68ZZ FRAGMENTATION IN RIGHT URETER, VIA NATURAL OR ARTIFICIAL OPENING ENDOSCOPIC: ICD-10-PCS | Performed by: UROLOGY

## 2025-06-18 PROCEDURE — 2500000003 HC RX 250 WO HCPCS: Performed by: NURSE PRACTITIONER

## 2025-06-18 PROCEDURE — 87040 BLOOD CULTURE FOR BACTERIA: CPT

## 2025-06-18 PROCEDURE — 74176 CT ABD & PELVIS W/O CONTRAST: CPT

## 2025-06-18 PROCEDURE — 74018 RADEX ABDOMEN 1 VIEW: CPT

## 2025-06-18 PROCEDURE — 96374 THER/PROPH/DIAG INJ IV PUSH: CPT

## 2025-06-18 PROCEDURE — BT161ZZ FLUOROSCOPY OF RIGHT URETER USING LOW OSMOLAR CONTRAST: ICD-10-PCS | Performed by: UROLOGY

## 2025-06-18 PROCEDURE — 87086 URINE CULTURE/COLONY COUNT: CPT

## 2025-06-18 PROCEDURE — 96375 TX/PRO/DX INJ NEW DRUG ADDON: CPT

## 2025-06-18 PROCEDURE — 0T768DZ DILATION OF RIGHT URETER WITH INTRALUMINAL DEVICE, VIA NATURAL OR ARTIFICIAL OPENING ENDOSCOPIC: ICD-10-PCS | Performed by: UROLOGY

## 2025-06-18 PROCEDURE — 99396 PREV VISIT EST AGE 40-64: CPT | Performed by: OBSTETRICS & GYNECOLOGY

## 2025-06-18 PROCEDURE — 96376 TX/PRO/DX INJ SAME DRUG ADON: CPT

## 2025-06-18 PROCEDURE — 1200000000 HC SEMI PRIVATE

## 2025-06-18 RX ORDER — SODIUM CHLORIDE 9 MG/ML
INJECTION, SOLUTION INTRAVENOUS CONTINUOUS
Status: ACTIVE | OUTPATIENT
Start: 2025-06-18 | End: 2025-06-19

## 2025-06-18 RX ORDER — SODIUM CHLORIDE 9 MG/ML
INJECTION, SOLUTION INTRAVENOUS PRN
Status: DISCONTINUED | OUTPATIENT
Start: 2025-06-18 | End: 2025-06-20 | Stop reason: HOSPADM

## 2025-06-18 RX ORDER — ONDANSETRON 2 MG/ML
4 INJECTION INTRAMUSCULAR; INTRAVENOUS ONCE
Status: COMPLETED | OUTPATIENT
Start: 2025-06-18 | End: 2025-06-18

## 2025-06-18 RX ORDER — POLYETHYLENE GLYCOL 3350 17 G/17G
17 POWDER, FOR SOLUTION ORAL DAILY PRN
Status: DISCONTINUED | OUTPATIENT
Start: 2025-06-18 | End: 2025-06-20 | Stop reason: HOSPADM

## 2025-06-18 RX ORDER — SODIUM CHLORIDE, SODIUM LACTATE, POTASSIUM CHLORIDE, AND CALCIUM CHLORIDE .6; .31; .03; .02 G/100ML; G/100ML; G/100ML; G/100ML
1000 INJECTION, SOLUTION INTRAVENOUS ONCE
Status: COMPLETED | OUTPATIENT
Start: 2025-06-18 | End: 2025-06-18

## 2025-06-18 RX ORDER — TAMSULOSIN HYDROCHLORIDE 0.4 MG/1
0.4 CAPSULE ORAL DAILY
Status: DISCONTINUED | OUTPATIENT
Start: 2025-06-18 | End: 2025-06-20 | Stop reason: HOSPADM

## 2025-06-18 RX ORDER — TRAZODONE HYDROCHLORIDE 50 MG/1
TABLET ORAL
COMMUNITY
Start: 2024-09-09

## 2025-06-18 RX ORDER — SODIUM CHLORIDE 0.9 % (FLUSH) 0.9 %
5-40 SYRINGE (ML) INJECTION PRN
Status: DISCONTINUED | OUTPATIENT
Start: 2025-06-18 | End: 2025-06-20 | Stop reason: HOSPADM

## 2025-06-18 RX ORDER — NALOXONE HYDROCHLORIDE 0.4 MG/ML
0.4 INJECTION, SOLUTION INTRAMUSCULAR; INTRAVENOUS; SUBCUTANEOUS PRN
Status: DISCONTINUED | OUTPATIENT
Start: 2025-06-18 | End: 2025-06-20 | Stop reason: HOSPADM

## 2025-06-18 RX ORDER — GLUCAGON 1 MG/ML
1 KIT INJECTION PRN
Status: DISCONTINUED | OUTPATIENT
Start: 2025-06-18 | End: 2025-06-20 | Stop reason: HOSPADM

## 2025-06-18 RX ORDER — MORPHINE SULFATE 4 MG/ML
4 INJECTION, SOLUTION INTRAMUSCULAR; INTRAVENOUS ONCE
Refills: 0 | Status: COMPLETED | OUTPATIENT
Start: 2025-06-18 | End: 2025-06-18

## 2025-06-18 RX ORDER — SODIUM CHLORIDE 0.9 % (FLUSH) 0.9 %
5-40 SYRINGE (ML) INJECTION EVERY 12 HOURS SCHEDULED
Status: DISCONTINUED | OUTPATIENT
Start: 2025-06-18 | End: 2025-06-20 | Stop reason: HOSPADM

## 2025-06-18 RX ORDER — ONDANSETRON 2 MG/ML
4 INJECTION INTRAMUSCULAR; INTRAVENOUS EVERY 6 HOURS PRN
Status: DISCONTINUED | OUTPATIENT
Start: 2025-06-18 | End: 2025-06-20 | Stop reason: HOSPADM

## 2025-06-18 RX ORDER — POTASSIUM CHLORIDE 1500 MG/1
40 TABLET, EXTENDED RELEASE ORAL PRN
Status: DISCONTINUED | OUTPATIENT
Start: 2025-06-18 | End: 2025-06-20 | Stop reason: HOSPADM

## 2025-06-18 RX ORDER — ACETAMINOPHEN 325 MG/1
650 TABLET ORAL EVERY 6 HOURS PRN
Status: DISCONTINUED | OUTPATIENT
Start: 2025-06-18 | End: 2025-06-20 | Stop reason: HOSPADM

## 2025-06-18 RX ORDER — HYDROMORPHONE HYDROCHLORIDE 1 MG/ML
0.5 INJECTION, SOLUTION INTRAMUSCULAR; INTRAVENOUS; SUBCUTANEOUS ONCE
Status: COMPLETED | OUTPATIENT
Start: 2025-06-18 | End: 2025-06-18

## 2025-06-18 RX ORDER — MAGNESIUM SULFATE IN WATER 40 MG/ML
2000 INJECTION, SOLUTION INTRAVENOUS PRN
Status: DISCONTINUED | OUTPATIENT
Start: 2025-06-18 | End: 2025-06-20 | Stop reason: HOSPADM

## 2025-06-18 RX ORDER — MORPHINE SULFATE 4 MG/ML
4 INJECTION, SOLUTION INTRAMUSCULAR; INTRAVENOUS
Refills: 0 | Status: COMPLETED | OUTPATIENT
Start: 2025-06-18 | End: 2025-06-18

## 2025-06-18 RX ORDER — KETOROLAC TROMETHAMINE 30 MG/ML
15 INJECTION, SOLUTION INTRAMUSCULAR; INTRAVENOUS EVERY 6 HOURS PRN
Status: DISPENSED | OUTPATIENT
Start: 2025-06-18 | End: 2025-06-19

## 2025-06-18 RX ORDER — ONDANSETRON 4 MG/1
4 TABLET, ORALLY DISINTEGRATING ORAL EVERY 8 HOURS PRN
Status: DISCONTINUED | OUTPATIENT
Start: 2025-06-18 | End: 2025-06-20 | Stop reason: HOSPADM

## 2025-06-18 RX ORDER — POTASSIUM CHLORIDE 7.45 MG/ML
10 INJECTION INTRAVENOUS PRN
Status: DISCONTINUED | OUTPATIENT
Start: 2025-06-18 | End: 2025-06-20 | Stop reason: HOSPADM

## 2025-06-18 RX ORDER — ACETAMINOPHEN 650 MG/1
650 SUPPOSITORY RECTAL EVERY 6 HOURS PRN
Status: DISCONTINUED | OUTPATIENT
Start: 2025-06-18 | End: 2025-06-20 | Stop reason: HOSPADM

## 2025-06-18 RX ORDER — HYDROMORPHONE HYDROCHLORIDE 1 MG/ML
0.5 INJECTION, SOLUTION INTRAMUSCULAR; INTRAVENOUS; SUBCUTANEOUS
Refills: 0 | Status: DISCONTINUED | OUTPATIENT
Start: 2025-06-18 | End: 2025-06-20 | Stop reason: HOSPADM

## 2025-06-18 RX ORDER — DEXTROSE MONOHYDRATE 100 MG/ML
INJECTION, SOLUTION INTRAVENOUS CONTINUOUS PRN
Status: DISCONTINUED | OUTPATIENT
Start: 2025-06-18 | End: 2025-06-20 | Stop reason: HOSPADM

## 2025-06-18 RX ORDER — INSULIN LISPRO 100 [IU]/ML
0-4 INJECTION, SOLUTION INTRAVENOUS; SUBCUTANEOUS
Status: DISCONTINUED | OUTPATIENT
Start: 2025-06-18 | End: 2025-06-20 | Stop reason: HOSPADM

## 2025-06-18 RX ORDER — OXYCODONE AND ACETAMINOPHEN 7.5; 325 MG/1; MG/1
1 TABLET ORAL EVERY 4 HOURS PRN
Refills: 0 | Status: DISCONTINUED | OUTPATIENT
Start: 2025-06-18 | End: 2025-06-20 | Stop reason: HOSPADM

## 2025-06-18 RX ORDER — HYDROMORPHONE HYDROCHLORIDE 1 MG/ML
0.25 INJECTION, SOLUTION INTRAMUSCULAR; INTRAVENOUS; SUBCUTANEOUS
Refills: 0 | Status: DISCONTINUED | OUTPATIENT
Start: 2025-06-18 | End: 2025-06-20 | Stop reason: HOSPADM

## 2025-06-18 RX ORDER — KETOROLAC TROMETHAMINE 30 MG/ML
30 INJECTION, SOLUTION INTRAMUSCULAR; INTRAVENOUS ONCE
Status: COMPLETED | OUTPATIENT
Start: 2025-06-18 | End: 2025-06-18

## 2025-06-18 RX ADMIN — MORPHINE SULFATE 4 MG: 4 INJECTION, SOLUTION INTRAMUSCULAR; INTRAVENOUS at 15:36

## 2025-06-18 RX ADMIN — KETOROLAC TROMETHAMINE 30 MG: 30 INJECTION, SOLUTION INTRAMUSCULAR at 17:20

## 2025-06-18 RX ADMIN — HYDROMORPHONE HYDROCHLORIDE 0.25 MG: 1 INJECTION, SOLUTION INTRAMUSCULAR; INTRAVENOUS; SUBCUTANEOUS at 21:17

## 2025-06-18 RX ADMIN — WATER 1000 MG: 1 INJECTION INTRAMUSCULAR; INTRAVENOUS; SUBCUTANEOUS at 17:19

## 2025-06-18 RX ADMIN — ONDANSETRON 4 MG: 2 INJECTION, SOLUTION INTRAMUSCULAR; INTRAVENOUS at 14:46

## 2025-06-18 RX ADMIN — SODIUM CHLORIDE, SODIUM LACTATE, POTASSIUM CHLORIDE, AND CALCIUM CHLORIDE 1000 ML: 600; 310; 30; 20 INJECTION, SOLUTION INTRAVENOUS at 16:03

## 2025-06-18 RX ADMIN — SODIUM CHLORIDE, SODIUM LACTATE, POTASSIUM CHLORIDE, AND CALCIUM CHLORIDE 1000 ML: 600; 310; 30; 20 INJECTION, SOLUTION INTRAVENOUS at 14:46

## 2025-06-18 RX ADMIN — SODIUM CHLORIDE, PRESERVATIVE FREE 10 ML: 5 INJECTION INTRAVENOUS at 22:00

## 2025-06-18 RX ADMIN — HYDROMORPHONE HYDROCHLORIDE 0.5 MG: 1 INJECTION, SOLUTION INTRAMUSCULAR; INTRAVENOUS; SUBCUTANEOUS at 17:33

## 2025-06-18 RX ADMIN — MORPHINE SULFATE 4 MG: 4 INJECTION, SOLUTION INTRAMUSCULAR; INTRAVENOUS at 14:46

## 2025-06-18 RX ADMIN — SODIUM CHLORIDE: 0.9 INJECTION, SOLUTION INTRAVENOUS at 21:16

## 2025-06-18 SDOH — ECONOMIC STABILITY: FOOD INSECURITY: WITHIN THE PAST 12 MONTHS, THE FOOD YOU BOUGHT JUST DIDN'T LAST AND YOU DIDN'T HAVE MONEY TO GET MORE.: NEVER TRUE

## 2025-06-18 SDOH — ECONOMIC STABILITY: FOOD INSECURITY: WITHIN THE PAST 12 MONTHS, YOU WORRIED THAT YOUR FOOD WOULD RUN OUT BEFORE YOU GOT MONEY TO BUY MORE.: NEVER TRUE

## 2025-06-18 ASSESSMENT — PATIENT HEALTH QUESTIONNAIRE - PHQ9
SUM OF ALL RESPONSES TO PHQ QUESTIONS 1-9: 0
SUM OF ALL RESPONSES TO PHQ QUESTIONS 1-9: 0
1. LITTLE INTEREST OR PLEASURE IN DOING THINGS: NOT AT ALL
2. FEELING DOWN, DEPRESSED OR HOPELESS: NOT AT ALL
SUM OF ALL RESPONSES TO PHQ QUESTIONS 1-9: 0
SUM OF ALL RESPONSES TO PHQ QUESTIONS 1-9: 0

## 2025-06-18 ASSESSMENT — ENCOUNTER SYMPTOMS
BACK PAIN: 1
DIARRHEA: 1
RESPIRATORY NEGATIVE: 1
VOMITING: 1
COUGH: 0
VOMITING: 1
DIARRHEA: 1
CONSTIPATION: 1
ABDOMINAL PAIN: 1
NAUSEA: 1
ABDOMINAL PAIN: 1
SHORTNESS OF BREATH: 0
NAUSEA: 1
RESPIRATORY NEGATIVE: 1

## 2025-06-18 ASSESSMENT — PAIN SCALES - GENERAL
PAINLEVEL_OUTOF10: 10
PAINLEVEL_OUTOF10: 8
PAINLEVEL_OUTOF10: 10
PAINLEVEL_OUTOF10: 6

## 2025-06-18 ASSESSMENT — PAIN - FUNCTIONAL ASSESSMENT: PAIN_FUNCTIONAL_ASSESSMENT: 0-10

## 2025-06-18 ASSESSMENT — PAIN DESCRIPTION - DESCRIPTORS: DESCRIPTORS: ACHING

## 2025-06-18 ASSESSMENT — PAIN DESCRIPTION - ORIENTATION
ORIENTATION: LOWER
ORIENTATION: RIGHT

## 2025-06-18 ASSESSMENT — PAIN DESCRIPTION - LOCATION
LOCATION: FLANK
LOCATION: OTHER (COMMENT)

## 2025-06-18 NOTE — ED PROVIDER NOTES
[facility] EMERGENCY DEPARTMENT  EMERGENCY DEPARTMENT ENCOUNTER      Pt Name: Chikis Mcnamara  MRN: [id number]  Birthdate 1985  Date of evaluation: 6/18/2025  Provider: MARIO Watters CNP  5:30 PM    CHIEF COMPLAINT       Chief Complaint   Patient presents with    Flank Pain     Right sided abd pain radiates into groin         HISTORY OF PRESENT ILLNESS    Chikis Mcnamara is a 40 y.o. female who presents to the emergency department with concern for right flank pain to RLQ abd pain that radiates to groin. Reports she went to bed feeling fine and woke up with pain. Reports n/v/d. Reports hx of kidney stones that previously required intervention many years ago, but they resolved when she stopped taking topamax and now no longer follows with a urologist. No fever or chills. Reports gall bladder removed.     On ozempic and wellbutrin at home.     HPI    Nursing Notes were reviewed.    REVIEW OF SYSTEMS       Review of Systems   Constitutional:  Negative for chills and fever.   HENT:  Negative for congestion.    Respiratory:  Negative for cough and shortness of breath.    Cardiovascular:  Negative for chest pain and palpitations.   Gastrointestinal:  Positive for abdominal pain, diarrhea, nausea and vomiting.   Genitourinary:  Positive for flank pain and vaginal bleeding. Negative for dysuria, frequency and urgency.   Neurological:  Negative for dizziness, syncope, weakness, light-headedness and headaches.             PAST MEDICAL HISTORY     Past Medical History:   Diagnosis Date    Fatigue     Fever     GERD (gastroesophageal reflux disease)     Gum disease     Headaches, cluster     Joint ache     Nausea & vomiting     PONV (postoperative nausea and vomiting)     AFTER THE  SHUNT    Pseudotumor cerebri     sees dr. maria in [facility]    Renal calculus, right 3/4/2018    Weight gain          SURGICAL HISTORY       Past Surgical History:   Procedure Laterality Date    ABDOMEN SURGERY      BREAST ENHANCEMENT  Palpations: Abdomen is soft.      Tenderness: There is no abdominal tenderness. There is right CVA tenderness and guarding. There is no left CVA tenderness.   Musculoskeletal:      Cervical back: Neck supple. No tenderness.   Skin:     General: Skin is warm and dry.      Capillary Refill: Capillary refill takes less than 2 seconds.   Neurological:      General: No focal deficit present.      Mental Status: She is alert and oriented to person, place, and time.         DIAGNOSTIC RESULTS     EKG: All EKG's are interpreted by the Emergency Department Physician who either signs or Co-signs this chart in the absence of a cardiologist.        RADIOLOGY:   Non-plain film images such as CT, Ultrasound and MRI are read by the radiologist. Plain radiographic images are visualized and preliminarily interpreted by the emergency physician with the below findings:        Interpretation per the Radiologist below, if available at the time of this note:    CT ABDOMEN PELVIS WO CONTRAST Additional Contrast? None   Final Result   1.  Moderate right hydronephrosis secondary to a 0.5 cm obstructing proximal right ureteral calculus.   2.  Additional findings as described.       ---------------------------        All CT scans are performed using dose optimization techniques as appropriate to the performed exam and include    at least one of the following: Automated exposure control, adjustment of the mA and/or kV according to size, and the use of iterative reconstruction technique.        ______________________________________    Electronically signed by: CJ HERNANDEZ M.D.   Date:     06/18/2025   Time:    16:00       XR ABDOMEN (KUB) (SINGLE AP VIEW)    (Results Pending)         ED BEDSIDE ULTRASOUND:   Performed by ED Physician - none    LABS:  Labs Reviewed   CBC WITH AUTO DIFFERENTIAL - Abnormal; Notable for the following components:       Result Value    WBC 12.1 (*)     MCH 32.1 (*)     MCHC 37.4 (*)     Neutrophils % 74.2 (*)      Lymphocytes % 18.7 (*)     Neutrophils Absolute 9.0 (*)     All other components within normal limits   COMPREHENSIVE METABOLIC PANEL - Abnormal; Notable for the following components:    CO2 20 (*)     Glucose 100 (*)     All other components within normal limits   URINALYSIS WITH REFLEX TO CULTURE - Abnormal; Notable for the following components:    Ketones, Urine TRACE (*)     Blood, Urine LARGE (*)     Protein, UA 30 (*)     Leukocyte Esterase, Urine SMALL (*)     All other components within normal limits   LACTATE, SEPSIS - Abnormal; Notable for the following components:    Lactic Acid, Sepsis 2.1 (*)     All other components within normal limits    Narrative:     CALL  [facility]  [facility] tel. ,  Chemistry results called to and read back by Anaya Azul RN in ED,  06/18/2025 15:48, by LISA   MICROSCOPIC URINALYSIS - Abnormal; Notable for the following components:    RBC, UA 50-75 (*)     All other components within normal limits   CULTURE, BLOOD 2   CULTURE, BLOOD 1   CULTURE, URINE   HCG, SERUM, QUALITATIVE   LIPASE   LACTIC ACID       All other labs were within normal range or not returned as of this dictation.    EMERGENCY DEPARTMENT COURSE and DIFFERENTIAL DIAGNOSIS/MDM:   Vitals:    Vitals:    06/18/25 1536 06/18/25 1600 06/18/25 1630 06/18/25 1645   BP:  115/72 111/76    Pulse:  (!) 107 (!) 102 (!) 103   Resp: 18 18 11 13   Temp:       SpO2:  99% 100% 100%           Medical Decision Making  Amount and/or Complexity of Data Reviewed  Labs: ordered. Decision-making details documented in ED Course.  Radiology: ordered. Decision-making details documented in ED Course.    Risk  Prescription drug management.            REASSESSMENT     ED Course as of 06/18/25 1730   Wed Jun 18, 2025   1551 30 ml/kg is approx 1700 ml of fluid. Her wbc is 12 her hr is 95 and lactate is mildly elevated at 2.1. she is normotensive. Urine and ct abd pelvis pending.  [BW]   1629 CT ABDOMEN PELVIS WO CONTRAST Additional Contrast?

## 2025-06-18 NOTE — PROGRESS NOTES
SUBJECTIVE:  Chikis Mcnamara is a 40 y.o.  who is here for gyn exam and c/o uterine prolapse and pelvic pain.      Review of Systems   Constitutional: Negative.    Respiratory: Negative.     Cardiovascular: Negative.    Gastrointestinal:  Positive for abdominal pain, constipation, diarrhea, nausea and vomiting.   Genitourinary:  Positive for pelvic pain.        Uterine prolapse and dysmenorrhea   Musculoskeletal:  Positive for back pain.   Neurological:  Positive for headaches.   All other systems reviewed and are negative.      GYN HX:   Patient's last menstrual period was 06/15/2025 (exact date).  Abnormal Bleeding/Menses: none  Abnormal pap smear:none    Social History     Substance and Sexual Activity   Sexual Activity Yes    Partners: Male     OB History          3    Para   2    Term   2       0    AB   1    Living   2         SAB   0    IAB   0    Ectopic   0    Molar        Multiple        Live Births   2                Because violence is so common, we ask all our patients: are you in a relationship or do you live with a person who threatens, hurts, orcontrols you: No    Past Medical History:   Diagnosis Date    Fatigue     Fever     GERD (gastroesophageal reflux disease)     Gum disease     Headaches, cluster     Joint ache     Nausea & vomiting     PONV (postoperative nausea and vomiting)     AFTER THE  SHUNT    Pseudotumor cerebri     sees dr. maria in [facility]    Renal calculus, right 3/4/2018    Weight gain      Past Surgical History:   Procedure Laterality Date    ABDOMEN SURGERY      BREAST ENHANCEMENT SURGERY      left breast implant    CHOLECYSTECTOMY      COLONOSCOPY      LITHOTRIPSY Right 2016    CYSTOSCOPY RETROGRADE PYELOGRAM LASER LITHOTRIPSY performed by Wood Adhikari MD at [facility] OR    MS LITHOTRIPSY XTRCORP SHOCK WAVE Right 3/27/2018    ESWL EXTRACORPEAL SHOCK WAVE LITHOTRIPSY performed by Wood Adhikari MD at [facility] OR    UPPER GASTROINTESTINAL ENDOSCOPY

## 2025-06-18 NOTE — CONSULTS
Consult Note            Date:6/18/2025        Patient Name:Chikis Mcnamara     YOB: 1985     Age:40 y.o.    Inpatient consult to Urology  Consult performed by: Wood Adhikari MD  Consult ordered by: Adriana Morales APRN - CNP  Reason for consult: Right flank pain right proximal ureteral calculus          Chief Complaint     Chief Complaint   Patient presents with    Flank Pain     Right sided abd pain radiates into groin          History Obtained From   patient, electronic medical record    History of Present Illness   Patient 40-year-old female prior history of stones she has had previous interventions including ESWL ureteroscopy stents.  She had been on Topamax in the past for seizures but no longer takes this.  She relates to me that she has had a prior metabolic study shows she has low citrate.  Her last stone episode by me was in 2018.  She comes in now with acute onset beginning yesterday of right flank pain rating to the right lower quadrant.  This morning she had an episode of nausea vomiting.  No fevers or chills.  She is having some feeling like she has incomplete emptying.  Emergency department she has CT scan that shows a 3 x 5 mm right proximal ureteral calculus.  She also has some nonobstructing stones in the kidney.  She has had some diaphoresis but no fevers or chills.  Her urine does not look infected.  Her white count was 12,000.  She was tachycardic so this prompted sepsis protocol also she had a lactic acid which is now normalized after IV fluids.  She is been given a dose of Rocephin and blood and urine cultures have been obtained.  She otherwise had no fevers.  She does not appear to look or feel toxic.  In the emergency department her pain has not been well-managed and well-controlled despite multiple doses of morphine and a dose of Toradol.  Therefore she is admitted for right renal colic secondary to proximal ureteral calculus for pain management and control.  Urology    Psychiatric:         Behavior: Behavior normal.         Labs    CBC:  Recent Labs     06/18/25  1440   WBC 12.1*   RBC 4.70   HGB 15.1   HCT 40.4   MCV 86.0   RDW 13.1        CHEMISTRIES:  Recent Labs     06/18/25  1440      K 4.2      CO2 20*   BUN 8   CREATININE 0.8   GLUCOSE 100*     PT/INR:No results for input(s): \"PROTIME\", \"INR\" in the last 72 hours.  APTT:No results for input(s): \"APTT\" in the last 72 hours.  LIVER PROFILE:  Recent Labs     06/18/25  1440   AST 19   ALT 19   BILITOT 1.2   ALKPHOS 78           Component  Ref Range & Units 06/18/25 1514   Color, UA  Straw/Yellow YELLOW   Clarity, UA  Clear Clear   Glucose, Ur  Negative mg/dL Negative   Bilirubin, Urine  Negative Negative   Ketones, Urine  Negative mg/dL TRACE Abnormal    Specific Gravity, UA  1.005 - 1.030 1.016   Blood, Urine  Negative LARGE Abnormal    pH, Urine  5.0 - 8.0 6.0   Protein, UA  Negative mg/dL 30 Abnormal    Urobilinogen, Urine  <2.0 E.U./dL 0.2   Nitrite, Urine  Negative Negative   Leukocyte Esterase, Urine  Negative SMALL Abnormal               Component  Ref Range & Units 06/18/25 1514   WBC, UA  0 - 5 /HPF 10-15   RBC, UA  0 - 2 /HPF 50-75 Abnormal    Epithelial Cells, UA  /HPF 0-2   Bacteria, UA  None Seen /HPF Rare   Resulting Agency [facility]        Specimen Collected: 06/18/25 1514 Last Resulted: 06/18/25 1556               Imaging/Diagnostics   XR ABDOMEN (KUB) (SINGLE AP VIEW)  Result Date: 6/18/2025   Right ureteral stone confirmed on CT.   ______________________________________ Electronically signed by: WILLARD LEGGETT M.D. Date:     06/18/2025 Time:    17:33     CT ABDOMEN PELVIS WO CONTRAST Additional Contrast? None  Result Date: 6/18/2025  1.  Moderate right hydronephrosis secondary to a 0.5 cm obstructing proximal right ureteral calculus. 2.  Additional findings as described.  ---------------------------  All CT scans are performed using dose optimization techniques as appropriate

## 2025-06-18 NOTE — H&P
[facility]      Hospitalist - History & Physical      PCP: Barber Montalvo MD    Date of Admission: 6/18/2025    Date of Service: 6/18/2025    Chief Complaint:  Flank pain     History Of Present Illness:   The patient is a 40 y.o. female who presented to [facility] ED for evaluation of flank pain. Pt has history of urinary tract stones and diabetes.     Pt relates that she developed right sided flank pain last night with worsening today associated with nausea and vomiting. Pt denies fevers as well as recent illness. Pt relates that she had follow up with her gynecologist today due to problems with uterine prolapse.     In ED, she was given rocephin 1g iv for uti, case discussed with urology plan for kub and pain control however patient has received dilaudid, morphine and toradol with continued flank pain.     Past Medical History:        Diagnosis Date    Fatigue     Fever     GERD (gastroesophageal reflux disease)     Gum disease     Headaches, cluster     Joint ache     Nausea & vomiting     PONV (postoperative nausea and vomiting)     AFTER THE  SHUNT    Pseudotumor cerebri     sees dr. maria in [facility]    Renal calculus, right 3/4/2018    Weight gain        Past Surgical History:        Procedure Laterality Date    ABDOMEN SURGERY      BREAST ENHANCEMENT SURGERY      left breast implant    CHOLECYSTECTOMY      COLONOSCOPY      LITHOTRIPSY Right 7/7/2016    CYSTOSCOPY RETROGRADE PYELOGRAM LASER LITHOTRIPSY performed by Wood Adhikari MD at [facility] OR    NV LITHOTRIPSY XTRCORP SHOCK WAVE Right 3/27/2018    ESWL EXTRACORPEAL SHOCK WAVE LITHOTRIPSY performed by Wood Adhikari MD at [facility] OR    UPPER GASTROINTESTINAL ENDOSCOPY  2005    VENTRICULOPERITONEAL SHUNT         Home Medications:  Prior to Admission medications    Medication Sig Start Date End Date Taking? Authorizing Provider   traZODone (DESYREL) 50 MG tablet TAKE 2 TABLETS BY MOUTH ONCE DAILY AT BEDTIME FOR INSOMNIA 9/9/24   Provider, Historical,

## 2025-06-19 ENCOUNTER — APPOINTMENT (OUTPATIENT)
Dept: GENERAL RADIOLOGY | Age: 40
End: 2025-06-19
Payer: MEDICAID

## 2025-06-19 ENCOUNTER — ANESTHESIA EVENT (OUTPATIENT)
Dept: OPERATING ROOM | Age: 40
End: 2025-06-19
Payer: MEDICAID

## 2025-06-19 ENCOUNTER — ANESTHESIA (OUTPATIENT)
Dept: OPERATING ROOM | Age: 40
End: 2025-06-19
Payer: MEDICAID

## 2025-06-19 LAB
ANION GAP SERPL CALCULATED.3IONS-SCNC: 9 MMOL/L (ref 8–16)
BACTERIA BLD CULT ORG #2: NORMAL
BACTERIA BLD CULT: NORMAL
BUN SERPL-MCNC: 6 MG/DL (ref 6–20)
CALCIUM SERPL-MCNC: 8.1 MG/DL (ref 8.6–10)
CHLORIDE SERPL-SCNC: 109 MMOL/L (ref 98–107)
CO2 SERPL-SCNC: 23 MMOL/L (ref 22–29)
CREAT SERPL-MCNC: 0.7 MG/DL (ref 0.5–0.9)
ERYTHROCYTE [DISTWIDTH] IN BLOOD BY AUTOMATED COUNT: 13.2 % (ref 11.5–14.5)
GLUCOSE BLD-MCNC: 101 MG/DL (ref 70–99)
GLUCOSE BLD-MCNC: 85 MG/DL (ref 70–99)
GLUCOSE BLD-MCNC: 89 MG/DL (ref 70–99)
GLUCOSE SERPL-MCNC: 86 MG/DL (ref 70–99)
HBA1C MFR BLD: <4.2 % (ref 4–5.6)
HCT VFR BLD AUTO: 29.9 % (ref 37–47)
HGB BLD-MCNC: 11.1 G/DL (ref 12–16)
MCH RBC QN AUTO: 32.8 PG (ref 27–31)
MCHC RBC AUTO-ENTMCNC: 37.1 G/DL (ref 33–37)
MCV RBC AUTO: 88.5 FL (ref 81–99)
PERFORMED ON: ABNORMAL
PERFORMED ON: NORMAL
PERFORMED ON: NORMAL
PLATELET # BLD AUTO: 145 K/UL (ref 130–400)
PMV BLD AUTO: 11.6 FL (ref 9.4–12.3)
POTASSIUM SERPL-SCNC: 3.6 MMOL/L (ref 3.5–5)
RBC # BLD AUTO: 3.38 M/UL (ref 4.2–5.4)
SODIUM SERPL-SCNC: 141 MMOL/L (ref 136–145)
WBC # BLD AUTO: 7 K/UL (ref 4.8–10.8)

## 2025-06-19 PROCEDURE — 99232 SBSQ HOSP IP/OBS MODERATE 35: CPT | Performed by: UROLOGY

## 2025-06-19 PROCEDURE — 1200000000 HC SEMI PRIVATE

## 2025-06-19 PROCEDURE — 52356 CYSTO/URETERO W/LITHOTRIPSY: CPT | Performed by: UROLOGY

## 2025-06-19 PROCEDURE — 7100000000 HC PACU RECOVERY - FIRST 15 MIN: Performed by: UROLOGY

## 2025-06-19 PROCEDURE — 3700000001 HC ADD 15 MINUTES (ANESTHESIA): Performed by: UROLOGY

## 2025-06-19 PROCEDURE — C2617 STENT, NON-COR, TEM W/O DEL: HCPCS | Performed by: UROLOGY

## 2025-06-19 PROCEDURE — 3600000004 HC SURGERY LEVEL 4 BASE: Performed by: UROLOGY

## 2025-06-19 PROCEDURE — 94760 N-INVAS EAR/PLS OXIMETRY 1: CPT

## 2025-06-19 PROCEDURE — 6370000000 HC RX 637 (ALT 250 FOR IP): Performed by: HOSPITALIST

## 2025-06-19 PROCEDURE — 3600000014 HC SURGERY LEVEL 4 ADDTL 15MIN: Performed by: UROLOGY

## 2025-06-19 PROCEDURE — 6360000002 HC RX W HCPCS

## 2025-06-19 PROCEDURE — 2580000003 HC RX 258

## 2025-06-19 PROCEDURE — 2500000003 HC RX 250 WO HCPCS

## 2025-06-19 PROCEDURE — C1769 GUIDE WIRE: HCPCS | Performed by: UROLOGY

## 2025-06-19 PROCEDURE — 3700000000 HC ANESTHESIA ATTENDED CARE: Performed by: UROLOGY

## 2025-06-19 PROCEDURE — 7100000001 HC PACU RECOVERY - ADDTL 15 MIN: Performed by: UROLOGY

## 2025-06-19 PROCEDURE — 6360000002 HC RX W HCPCS: Performed by: UROLOGY

## 2025-06-19 PROCEDURE — 2720000010 HC SURG SUPPLY STERILE: Performed by: UROLOGY

## 2025-06-19 PROCEDURE — 2709999900 HC NON-CHARGEABLE SUPPLY: Performed by: UROLOGY

## 2025-06-19 PROCEDURE — 6370000000 HC RX 637 (ALT 250 FOR IP): Performed by: UROLOGY

## 2025-06-19 PROCEDURE — 36415 COLL VENOUS BLD VENIPUNCTURE: CPT

## 2025-06-19 PROCEDURE — 2500000003 HC RX 250 WO HCPCS: Performed by: UROLOGY

## 2025-06-19 PROCEDURE — 80048 BASIC METABOLIC PNL TOTAL CA: CPT

## 2025-06-19 PROCEDURE — 85027 COMPLETE CBC AUTOMATED: CPT

## 2025-06-19 PROCEDURE — C1726 CATH, BAL DIL, NON-VASCULAR: HCPCS | Performed by: UROLOGY

## 2025-06-19 PROCEDURE — C1758 CATHETER, URETERAL: HCPCS | Performed by: UROLOGY

## 2025-06-19 PROCEDURE — 82962 GLUCOSE BLOOD TEST: CPT

## 2025-06-19 PROCEDURE — 83036 HEMOGLOBIN GLYCOSYLATED A1C: CPT

## 2025-06-19 PROCEDURE — 74420 UROGRAPHY RTRGR +-KUB: CPT

## 2025-06-19 DEVICE — URETERAL STENT WITH SIDE HOLES 6FX26CM
Type: IMPLANTABLE DEVICE | Site: URETER | Status: FUNCTIONAL
Brand: TRIA™ SOFT

## 2025-06-19 RX ORDER — MEPERIDINE HYDROCHLORIDE 25 MG/ML
12.5 INJECTION INTRAMUSCULAR; INTRAVENOUS; SUBCUTANEOUS
Status: DISCONTINUED | OUTPATIENT
Start: 2025-06-19 | End: 2025-06-19 | Stop reason: HOSPADM

## 2025-06-19 RX ORDER — PROCHLORPERAZINE EDISYLATE 5 MG/ML
5 INJECTION INTRAMUSCULAR; INTRAVENOUS
Status: DISCONTINUED | OUTPATIENT
Start: 2025-06-19 | End: 2025-06-19 | Stop reason: HOSPADM

## 2025-06-19 RX ORDER — LABETALOL HYDROCHLORIDE 5 MG/ML
10 INJECTION, SOLUTION INTRAVENOUS
Status: DISCONTINUED | OUTPATIENT
Start: 2025-06-19 | End: 2025-06-19 | Stop reason: HOSPADM

## 2025-06-19 RX ORDER — LIDOCAINE HYDROCHLORIDE 10 MG/ML
INJECTION, SOLUTION INFILTRATION; PERINEURAL
Status: DISCONTINUED | OUTPATIENT
Start: 2025-06-19 | End: 2025-06-19 | Stop reason: SDUPTHER

## 2025-06-19 RX ORDER — PHENAZOPYRIDINE HYDROCHLORIDE 100 MG/1
100 TABLET, FILM COATED ORAL 3 TIMES DAILY PRN
Status: DISCONTINUED | OUTPATIENT
Start: 2025-06-19 | End: 2025-06-20 | Stop reason: HOSPADM

## 2025-06-19 RX ORDER — SODIUM CHLORIDE 0.9 % (FLUSH) 0.9 %
5-40 SYRINGE (ML) INJECTION PRN
Status: DISCONTINUED | OUTPATIENT
Start: 2025-06-19 | End: 2025-06-19 | Stop reason: HOSPADM

## 2025-06-19 RX ORDER — HYDRALAZINE HYDROCHLORIDE 20 MG/ML
10 INJECTION INTRAMUSCULAR; INTRAVENOUS
Status: DISCONTINUED | OUTPATIENT
Start: 2025-06-19 | End: 2025-06-19 | Stop reason: HOSPADM

## 2025-06-19 RX ORDER — MIDAZOLAM HYDROCHLORIDE 1 MG/ML
INJECTION, SOLUTION INTRAMUSCULAR; INTRAVENOUS
Status: DISCONTINUED | OUTPATIENT
Start: 2025-06-19 | End: 2025-06-19 | Stop reason: SDUPTHER

## 2025-06-19 RX ORDER — HYDROMORPHONE HYDROCHLORIDE 1 MG/ML
0.25 INJECTION, SOLUTION INTRAMUSCULAR; INTRAVENOUS; SUBCUTANEOUS EVERY 5 MIN PRN
Status: DISCONTINUED | OUTPATIENT
Start: 2025-06-19 | End: 2025-06-19 | Stop reason: HOSPADM

## 2025-06-19 RX ORDER — PHENAZOPYRIDINE HYDROCHLORIDE 100 MG/1
100 TABLET, FILM COATED ORAL ONCE
Status: COMPLETED | OUTPATIENT
Start: 2025-06-19 | End: 2025-06-19

## 2025-06-19 RX ORDER — ONDANSETRON 2 MG/ML
INJECTION INTRAMUSCULAR; INTRAVENOUS
Status: DISCONTINUED | OUTPATIENT
Start: 2025-06-19 | End: 2025-06-19 | Stop reason: SDUPTHER

## 2025-06-19 RX ORDER — ROCURONIUM BROMIDE 10 MG/ML
INJECTION, SOLUTION INTRAVENOUS
Status: DISCONTINUED | OUTPATIENT
Start: 2025-06-19 | End: 2025-06-19 | Stop reason: SDUPTHER

## 2025-06-19 RX ORDER — FENTANYL CITRATE 50 UG/ML
INJECTION, SOLUTION INTRAMUSCULAR; INTRAVENOUS
Status: DISCONTINUED | OUTPATIENT
Start: 2025-06-19 | End: 2025-06-19 | Stop reason: SDUPTHER

## 2025-06-19 RX ORDER — PROPOFOL 10 MG/ML
INJECTION, EMULSION INTRAVENOUS
Status: DISCONTINUED | OUTPATIENT
Start: 2025-06-19 | End: 2025-06-19 | Stop reason: SDUPTHER

## 2025-06-19 RX ORDER — HYOSCYAMINE SULFATE 0.12 MG/1
0.12 TABLET SUBLINGUAL EVERY 4 HOURS PRN
Status: DISCONTINUED | OUTPATIENT
Start: 2025-06-19 | End: 2025-06-20 | Stop reason: HOSPADM

## 2025-06-19 RX ORDER — HYDROMORPHONE HYDROCHLORIDE 1 MG/ML
0.5 INJECTION, SOLUTION INTRAMUSCULAR; INTRAVENOUS; SUBCUTANEOUS EVERY 5 MIN PRN
Status: DISCONTINUED | OUTPATIENT
Start: 2025-06-19 | End: 2025-06-19 | Stop reason: HOSPADM

## 2025-06-19 RX ORDER — KETOROLAC TROMETHAMINE 30 MG/ML
30 INJECTION, SOLUTION INTRAMUSCULAR; INTRAVENOUS ONCE
Status: DISCONTINUED | OUTPATIENT
Start: 2025-06-19 | End: 2025-06-19

## 2025-06-19 RX ORDER — SODIUM CHLORIDE 0.9 % (FLUSH) 0.9 %
5-40 SYRINGE (ML) INJECTION EVERY 12 HOURS SCHEDULED
Status: DISCONTINUED | OUTPATIENT
Start: 2025-06-19 | End: 2025-06-19 | Stop reason: HOSPADM

## 2025-06-19 RX ORDER — SODIUM CHLORIDE 9 MG/ML
INJECTION, SOLUTION INTRAVENOUS PRN
Status: DISCONTINUED | OUTPATIENT
Start: 2025-06-19 | End: 2025-06-19 | Stop reason: HOSPADM

## 2025-06-19 RX ORDER — NALOXONE HYDROCHLORIDE 0.4 MG/ML
INJECTION, SOLUTION INTRAMUSCULAR; INTRAVENOUS; SUBCUTANEOUS PRN
Status: DISCONTINUED | OUTPATIENT
Start: 2025-06-19 | End: 2025-06-19 | Stop reason: HOSPADM

## 2025-06-19 RX ORDER — IPRATROPIUM BROMIDE AND ALBUTEROL SULFATE 2.5; .5 MG/3ML; MG/3ML
1 SOLUTION RESPIRATORY (INHALATION)
Status: DISCONTINUED | OUTPATIENT
Start: 2025-06-19 | End: 2025-06-19 | Stop reason: HOSPADM

## 2025-06-19 RX ORDER — DEXMEDETOMIDINE HYDROCHLORIDE 100 UG/ML
INJECTION, SOLUTION INTRAVENOUS
Status: DISCONTINUED | OUTPATIENT
Start: 2025-06-19 | End: 2025-06-19 | Stop reason: SDUPTHER

## 2025-06-19 RX ORDER — SODIUM CHLORIDE 9 MG/ML
INJECTION, SOLUTION INTRAVENOUS CONTINUOUS
Status: DISCONTINUED | OUTPATIENT
Start: 2025-06-19 | End: 2025-06-20 | Stop reason: HOSPADM

## 2025-06-19 RX ORDER — HYOSCYAMINE SULFATE 0.12 MG/1
0.12 TABLET SUBLINGUAL ONCE
Status: COMPLETED | OUTPATIENT
Start: 2025-06-19 | End: 2025-06-19

## 2025-06-19 RX ORDER — SUCCINYLCHOLINE CHLORIDE 20 MG/ML
INJECTION INTRAMUSCULAR; INTRAVENOUS
Status: DISCONTINUED | OUTPATIENT
Start: 2025-06-19 | End: 2025-06-19 | Stop reason: SDUPTHER

## 2025-06-19 RX ORDER — DIPHENHYDRAMINE HYDROCHLORIDE 50 MG/ML
12.5 INJECTION, SOLUTION INTRAMUSCULAR; INTRAVENOUS
Status: DISCONTINUED | OUTPATIENT
Start: 2025-06-19 | End: 2025-06-19 | Stop reason: HOSPADM

## 2025-06-19 RX ORDER — SODIUM CHLORIDE, SODIUM LACTATE, POTASSIUM CHLORIDE, CALCIUM CHLORIDE 600; 310; 30; 20 MG/100ML; MG/100ML; MG/100ML; MG/100ML
INJECTION, SOLUTION INTRAVENOUS
Status: DISCONTINUED | OUTPATIENT
Start: 2025-06-19 | End: 2025-06-19 | Stop reason: SDUPTHER

## 2025-06-19 RX ORDER — TAMSULOSIN HYDROCHLORIDE 0.4 MG/1
0.4 CAPSULE ORAL ONCE
Status: COMPLETED | OUTPATIENT
Start: 2025-06-19 | End: 2025-06-19

## 2025-06-19 RX ORDER — DEXAMETHASONE SODIUM PHOSPHATE 4 MG/ML
INJECTION, SOLUTION INTRA-ARTICULAR; INTRALESIONAL; INTRAMUSCULAR; INTRAVENOUS; SOFT TISSUE
Status: DISCONTINUED | OUTPATIENT
Start: 2025-06-19 | End: 2025-06-19 | Stop reason: SDUPTHER

## 2025-06-19 RX ORDER — LEVOFLOXACIN 5 MG/ML
500 INJECTION, SOLUTION INTRAVENOUS
Status: COMPLETED | OUTPATIENT
Start: 2025-06-19 | End: 2025-06-19

## 2025-06-19 RX ADMIN — ROCURONIUM BROMIDE 20 MG: 10 INJECTION, SOLUTION INTRAVENOUS at 16:41

## 2025-06-19 RX ADMIN — PHENAZOPYRIDINE 100 MG: 100 TABLET ORAL at 17:54

## 2025-06-19 RX ADMIN — HYDROMORPHONE HYDROCHLORIDE 0.25 MG: 1 INJECTION, SOLUTION INTRAMUSCULAR; INTRAVENOUS; SUBCUTANEOUS at 17:49

## 2025-06-19 RX ADMIN — DEXAMETHASONE SODIUM PHOSPHATE 10 MG: 4 INJECTION, SOLUTION INTRAMUSCULAR; INTRAVENOUS at 17:00

## 2025-06-19 RX ADMIN — DEXMEDETOMIDINE HYDROCHLORIDE 4 MCG: 100 INJECTION, SOLUTION, CONCENTRATE INTRAVENOUS at 16:34

## 2025-06-19 RX ADMIN — DEXMEDETOMIDINE HYDROCHLORIDE 6 MCG: 100 INJECTION, SOLUTION, CONCENTRATE INTRAVENOUS at 16:27

## 2025-06-19 RX ADMIN — HYOSCYAMINE SULFATE 0.12 MG: 0.12 TABLET SUBLINGUAL at 17:53

## 2025-06-19 RX ADMIN — TAMSULOSIN HYDROCHLORIDE 0.4 MG: 0.4 CAPSULE ORAL at 17:53

## 2025-06-19 RX ADMIN — SODIUM CHLORIDE, SODIUM LACTATE, POTASSIUM CHLORIDE, AND CALCIUM CHLORIDE: 600; 310; 30; 20 INJECTION, SOLUTION INTRAVENOUS at 16:13

## 2025-06-19 RX ADMIN — ACETAMINOPHEN, ASPIRIN AND CAFFEINE 1 TABLET: 250; 250; 65 TABLET, FILM COATED ORAL at 13:55

## 2025-06-19 RX ADMIN — OXYCODONE HYDROCHLORIDE AND ACETAMINOPHEN 1 TABLET: 7.5; 325 TABLET ORAL at 09:28

## 2025-06-19 RX ADMIN — FENTANYL CITRATE 25 MCG: 0.05 INJECTION, SOLUTION INTRAMUSCULAR; INTRAVENOUS at 16:34

## 2025-06-19 RX ADMIN — TAMSULOSIN HYDROCHLORIDE 0.4 MG: 0.4 CAPSULE ORAL at 01:47

## 2025-06-19 RX ADMIN — PROPOFOL 200 MG: 10 INJECTION, EMULSION INTRAVENOUS at 16:21

## 2025-06-19 RX ADMIN — FENTANYL CITRATE 25 MCG: 0.05 INJECTION, SOLUTION INTRAMUSCULAR; INTRAVENOUS at 16:40

## 2025-06-19 RX ADMIN — ONDANSETRON 4 MG: 2 INJECTION INTRAMUSCULAR; INTRAVENOUS at 17:00

## 2025-06-19 RX ADMIN — LEVOFLOXACIN 500 MG: 5 INJECTION, SOLUTION INTRAVENOUS at 16:29

## 2025-06-19 RX ADMIN — MIDAZOLAM 2 MG: 1 INJECTION INTRAMUSCULAR; INTRAVENOUS at 16:13

## 2025-06-19 RX ADMIN — ROCURONIUM BROMIDE 30 MG: 10 INJECTION, SOLUTION INTRAVENOUS at 16:33

## 2025-06-19 RX ADMIN — KETOROLAC TROMETHAMINE 15 MG: 30 INJECTION, SOLUTION INTRAMUSCULAR at 01:46

## 2025-06-19 RX ADMIN — SODIUM CHLORIDE, SODIUM LACTATE, POTASSIUM CHLORIDE, AND CALCIUM CHLORIDE: 600; 310; 30; 20 INJECTION, SOLUTION INTRAVENOUS at 17:23

## 2025-06-19 RX ADMIN — OXYCODONE HYDROCHLORIDE AND ACETAMINOPHEN 1 TABLET: 7.5; 325 TABLET ORAL at 01:47

## 2025-06-19 RX ADMIN — FENTANYL CITRATE 50 MCG: 0.05 INJECTION, SOLUTION INTRAMUSCULAR; INTRAVENOUS at 16:18

## 2025-06-19 RX ADMIN — SUGAMMADEX 250 MG: 100 INJECTION, SOLUTION INTRAVENOUS at 17:14

## 2025-06-19 RX ADMIN — OXYCODONE HYDROCHLORIDE AND ACETAMINOPHEN 1 TABLET: 7.5; 325 TABLET ORAL at 23:55

## 2025-06-19 RX ADMIN — ONDANSETRON 4 MG: 2 INJECTION, SOLUTION INTRAMUSCULAR; INTRAVENOUS at 18:22

## 2025-06-19 RX ADMIN — HYDROMORPHONE HYDROCHLORIDE 0.25 MG: 1 INJECTION, SOLUTION INTRAMUSCULAR; INTRAVENOUS; SUBCUTANEOUS at 17:40

## 2025-06-19 RX ADMIN — SUCCINYLCHOLINE CHLORIDE 200 MG: 20 INJECTION, SOLUTION INTRAMUSCULAR; INTRAVENOUS at 16:21

## 2025-06-19 RX ADMIN — WATER 1000 MG: 1 INJECTION INTRAMUSCULAR; INTRAVENOUS; SUBCUTANEOUS at 18:23

## 2025-06-19 RX ADMIN — LIDOCAINE HYDROCHLORIDE 50 MG: 10 INJECTION, SOLUTION INFILTRATION; PERINEURAL at 16:21

## 2025-06-19 RX ADMIN — TAMSULOSIN HYDROCHLORIDE 0.4 MG: 0.4 CAPSULE ORAL at 09:28

## 2025-06-19 SDOH — ECONOMIC STABILITY: FOOD INSECURITY: WITHIN THE PAST 12 MONTHS, YOU WORRIED THAT YOUR FOOD WOULD RUN OUT BEFORE YOU GOT MONEY TO BUY MORE.: NEVER TRUE

## 2025-06-19 SDOH — ECONOMIC STABILITY: INCOME INSECURITY: HOW HARD IS IT FOR YOU TO PAY FOR THE VERY BASICS LIKE FOOD, HOUSING, MEDICAL CARE, AND HEATING?: NOT HARD AT ALL

## 2025-06-19 SDOH — ECONOMIC STABILITY: INCOME INSECURITY: IN THE PAST 12 MONTHS, HAS THE ELECTRIC, GAS, OIL, OR WATER COMPANY THREATENED TO SHUT OFF SERVICE IN YOUR HOME?: NO

## 2025-06-19 ASSESSMENT — PAIN DESCRIPTION - DESCRIPTORS
DESCRIPTORS: ACHING
DESCRIPTORS: SPASM
DESCRIPTORS: ACHING
DESCRIPTORS: THROBBING;SORE

## 2025-06-19 ASSESSMENT — PAIN DESCRIPTION - LOCATION
LOCATION: PELVIS
LOCATION: FLANK
LOCATION: HEAD

## 2025-06-19 ASSESSMENT — PATIENT HEALTH QUESTIONNAIRE - PHQ9
SUM OF ALL RESPONSES TO PHQ QUESTIONS 1-9: 0
1. LITTLE INTEREST OR PLEASURE IN DOING THINGS: NOT AT ALL
2. FEELING DOWN, DEPRESSED OR HOPELESS: NOT AT ALL
SUM OF ALL RESPONSES TO PHQ QUESTIONS 1-9: 0

## 2025-06-19 ASSESSMENT — PAIN SCALES - GENERAL
PAINLEVEL_OUTOF10: 5
PAINLEVEL_OUTOF10: 7
PAINLEVEL_OUTOF10: 5
PAINLEVEL_OUTOF10: 8
PAINLEVEL_OUTOF10: 6

## 2025-06-19 ASSESSMENT — PAIN DESCRIPTION - ORIENTATION
ORIENTATION: LOWER
ORIENTATION: MID
ORIENTATION: LOWER
ORIENTATION: LOWER
ORIENTATION: MID

## 2025-06-19 ASSESSMENT — PAIN - FUNCTIONAL ASSESSMENT
PAIN_FUNCTIONAL_ASSESSMENT: PREVENTS OR INTERFERES SOME ACTIVE ACTIVITIES AND ADLS
PAIN_FUNCTIONAL_ASSESSMENT: ACTIVITIES ARE NOT PREVENTED

## 2025-06-19 ASSESSMENT — LIFESTYLE VARIABLES: SMOKING_STATUS: 0

## 2025-06-19 NOTE — PROGRESS NOTES
4 Eyes Skin Assessment     NAME:  Chikis Mcnamara  YOB: 1985  MEDICAL RECORD NUMBER:  [id number]    The patient is being assessed for  Admission    I agree that at least one RN has performed a thorough Head to Toe Skin Assessment on the patient. ALL assessment sites listed below have been assessed.      Areas assessed by both nurses:    Head, Face, Ears, Shoulders, Back, Chest, Arms, Elbows, Hands, Sacrum. Buttock, Coccyx, Ischium, Legs. Feet and Heels, and Under Medical Devices         Does the Patient have a Wound? No noted wound(s)       León Prevention initiated by RN: No  Wound Care Orders initiated by RN: No    Pressure Injury (Stage 3,4, Unstageable, DTI, NWPT, and Complex wounds) if present, place Wound referral order by RN under : No    New Ostomies, if present place, Ostomy referral order under : No     Nurse 1 eSignature: Electronically signed by Rosangela Gastelum RN on 6/19/25 at 2:05 AM CDT    **SHARE this note so that the co-signing nurse can place an eSignature**    Nurse 2 eSignature: Electronically signed by Allison Murphy RN on 6/19/25 at 2:10 AM CDT

## 2025-06-19 NOTE — OP NOTE
[address] [place] [address]                            OPERATIVE REPORT      PATIENT NAME: EL BROWN                : 1985  MED REC NO: [id number]                          ROOM: [facility]  ACCOUNT NO: [id number]                       ADMIT DATE: 2025  PROVIDER: Wood Adhikari MD      DATE OF PROCEDURE:  2025    SURGEON:  Wood Adhikari MD    TITLE OF OPERATION:  Right ureteroscopy, laser lithotripsy, and placement of a 6-Slovak x 26 cm Tria Soft ureteral stent.    PREOPERATIVE DIAGNOSIS:  Right proximal ureteral calculus.    POSTOPERATIVE DIAGNOSIS:  Right proximal ureteral calculus.    ANESTHESIA:  General anesthetic.    ESTIMATED BLOOD LOSS:  0.    HISTORY:  The patient presented yesterday with right renal colic with intractable pain, the pain was not well controlled.  CT scan showed a stone 3 x 5 mm in the proximal right ureter with hydronephrosis.  She did not appear infected; however, her pain was not controlled.  Therefore, it was felt that she would need definitive intervention.  We did observe her overnight and she continued to have pain today, so therefore she now presents to undergo with intervention.  We will plan for cystoscopy, right ureteroscopy, laser lithotripsy, stone extraction, and stent placement.  We discussed the risk of injury to the ureter.  She does understand if I am not able to get to the stone, we may need to place a stent and do a planned staged procedure.  We discussed the risk of infection, need for stenting, need for stent removal, need for subsequent adjuvant repeat or staged procedures.  She has agreed to proceed.    DESCRIPTION OF PROCEDURE:  The patient was brought to the operating room.  She underwent general anesthetic.  She was placed in lithotomy position.  Her genitalia were prepped and draped in routine sterile fashion.  She received a preoperative antibiotic.  A time-out was

## 2025-06-19 NOTE — DISCHARGE INSTRUCTIONS

## 2025-06-19 NOTE — PROGRESS NOTES
Urology Progress Note      SUBJECTIVE: Patient states she had a terrible night no sleep she continues to require pain medicine overnight this morning she rates her pain a 4 but it comes and goes becomes more severe    OBJECTIVE:      Physical  VITALS:  /74   Pulse 93   Temp 97.7 °F (36.5 °C) (Temporal)   Resp 18   Ht 1.626 m (5' 4\")   Wt 55.8 kg (123 lb)   LMP 06/15/2025 (Exact Date)   SpO2 95%   BMI 21.11 kg/m²   TEMPERATURE:  Current - Temp: 97.7 °F (36.5 °C); Max - Temp  Av.3 °F (36.8 °C)  Min: 97.7 °F (36.5 °C)  Max: 98.5 °F (36.9 °C)   24 HR I&O No intake or output data in the 24 hours ending 25 0745  BACK: flank tenderness: right  ABDOMEN:  soft, non-distended, and tenderness noted in the right lower quadrant  HEART:  normal rate and regular rhythm  CHEST: Normal effort  GENITAL/URINARY:      Data       CBC:   Recent Labs     25  1440 25  0404   WBC 12.1* 7.0   HGB 15.1 11.1*   HCT 40.4 29.9*    145     BMP:    Recent Labs     25  1440 25  0404    141   K 4.2 3.6    109*   CO2 20* 23   BUN 8 6   CREATININE 0.8 0.7   GLUCOSE 100* 86       No results for input(s): \"LABURIN\" in the last 72 hours.  No results for input(s): \"BC\" in the last 72 hours.  No results for input(s): \"BLOODCULT2\" in the last 72 hours.      Cultures no results        U/A:    Lab Results   Component Value Date/Time    NITRITE neg 2017 10:21 AM    COLORU YELLOW 2025 03:14 PM    PHUR 6.0 2025 03:14 PM    PHUR 7.0 04/10/2018 01:13 PM    WBCUA 10-15 2025 03:14 PM    RBCUA 50-75 2025 03:14 PM    RBCUA 224 2015 10:15 PM    YEAST 0 2016 04:32 PM    BACTERIA Rare 2025 03:14 PM    CLARITYU Clear 2025 03:14 PM    SPECGRAV 1.015 2017 10:21 AM    LEUKOCYTESUR SMALL 2025 03:14 PM    UROBILINOGEN 0.2 2025 03:14 PM    BILIRUBINUR Negative 2025 03:14 PM    BLOODU LARGE 2025 03:14 PM    GLUCOSEU Negative  06/18/2025 03:14 PM       Radiology:   XR ABDOMEN (KUB) (SINGLE AP VIEW)  Result Date: 6/18/2025  EXAM: FRONTAL VIEW OF THE ABDOMEN.  HISTORY:  Stone evaluation.  COMPARISON:  CT same date.  FINDINGS:  No evidence of  free air. Bowel does not appear obstructed. No acute osseous abnormality.3.3 mm ureteral stone on the right.       Right ureteral stone confirmed on CT.   ______________________________________ Electronically signed by: WILLARD LEGGETT M.D. Date:     06/18/2025 Time:    17:33     CT ABDOMEN PELVIS WO CONTRAST Additional Contrast? None  Result Date: 6/18/2025  EXAM:  CT ABDOMEN WITHOUT CONTRAST.  CT PELVIS WITHOUT CONTRAST.  HISTORY:  Right flank pain.  COMPARISON:  08/23/2024.  TECHNIQUE:  Multiple axial images of the abdomen and pelvis were obtained without intravenous contrast.  Images were reformatted in the sagittal and coronal plane.  FINDINGS:  Please note that evaluation of the abdominal and pelvic structures is limited due to lack of intravenous contrast.  Moderate right hydronephrosis secondary to a 0.5 x 0.4 x 0.3 cm proximal right ureteral calculus at the L3-4 level.  Additional old bilateral renal calculi are present.  There is no left hydronephrosis.  There is a cyst off the left lower pole cortex measuring up to 1.4 cm.  Gallbladder absent.  Liver, pancreas, spleen, adrenal glands are unremarkable.  Duodenal diverticuli present.  There is no bowel obstruction or acute inflammation.  Appendix normal.  Ventriculoperitoneal shunt catheter enters the anterior right upper abdomen with tip located in the central pelvis.  There is a small amount of free fluid in the pelvis, which is stable from prior study.  Nabothian cyst in the cervix.  Uterus demonstrates normal contour.  Fluid fluid level left adnexa on axial image 78 likely due to complicated/hemorrhagic left ovarian cyst.  Urinary bladder is normal.  Phleboliths in the pelvis.  Aorta normal in caliber.  Tiny fatty umbilical hernia present.  No  lithotripsy    2.  Right proximal ureteral calculus.  This is visible on KUB.  Will plan for cystoscopy right retrograde stone ablation right ureteral stent placement with attempted ureteroscopy laser lithotripsy.  If unable to get up to the stone since she is not presented we will plan for stent placement in a staged procedure after passive dilation with a stent.  Risk and complication to be discussed with her including risk of injury to the ureter need for subsequent adjuvant repeat or staged procedures infection she agrees to proceed.    3.  Bilateral nonobstructing renal calculi expectant management    Wood Adhikari MD

## 2025-06-19 NOTE — PROGRESS NOTES
Progress Note    Date:2025       Room:[facility] OR Pool/NONE  Patient Name:Chikis Mcnamara     YOB: 1985     Age:40 y.o.      Subjective    Subjective: 40 year old female with history of urinary tract stones and diabetes who presented to [facility] ED for evaluation of flank pain.        In ED, CT abd/pel with moderate right hydronephrosis secondary to a 0.5 cm obstructing proximal right ureteral calculus, was given rocephin 1g iv for uti, seen by urology plan for operating room later this afternoon for cystoscopy right retrograde stone inflation right ureteral stent placement possible ureteroscopy laser lithotripsy        Review of Systems: 10 point system reviewed and negative except as stated above.    Objective         Vitals Last 24 Hours:  TEMPERATURE:  Temp  Av.2 °F (36.8 °C)  Min: 97.7 °F (36.5 °C)  Max: 98.5 °F (36.9 °C)  RESPIRATIONS RANGE: Resp  Av.8  Min: 10  Max: 34  PULSE OXIMETRY RANGE: SpO2  Av.9 %  Min: 93 %  Max: 100 %  PULSE RANGE: Pulse  Av.1  Min: 87  Max: 108  BLOOD PRESSURE RANGE: Systolic (24hrs), Av , Min:97 , Max:116   ; Diastolic (24hrs), Av, Min:60, Max:86    I/O (24Hr):    Intake/Output Summary (Last 24 hours) at 2025 1621  Last data filed at 2025 1355  Gross per 24 hour   Intake --   Output 400 ml   Net -400 ml       Physical Examination:  General: Well-developed, no acute distress lying comfortably in bed.  HEENT: Atraumatic normocephalic, range of motion normal, no JVD, no tracheal deviation noted.  Cardiac: Normal S1-S2 no murmurs rub or gallop.  Respiratory: clear To auscultation bilaterally, no rhonchi or rales, no wheezing  Abdomen: Soft, positive bowel sounds in all quadrants, no distention, nontender to palpation, no rebound noted.    : No CVA tenderness  Extremities: no tenderness, no edema, moves all extremities  Psych: Affect normal and good eye contact, behavioral normal.        Labs/Imaging/Diagnostics    Labs:  CBC:  Recent Labs

## 2025-06-19 NOTE — ED NOTES
ED TO INPATIENT SBAR HANDOFF    Patient Name: Chikis Mcnamara   : 1985  40 y.o.   Family/Caregiver Present: yes  Code Status Order: Full Code    C-SSRS: Risk of Suicide: No Risk  Sitter No  Restraints:         Situation  Chief Complaint:   Chief Complaint   Patient presents with    Flank Pain     Right sided abd pain radiates into groin     Patient Diagnosis: Ureterolithiasis [N20.1]     Brief Description of Patient's Condition: Pt being admitted ureterolithiasis has a RT ureteral stone. Pt came in today with c/o  of RT flank/abd pain that radiated to groin. Pt has received abx in ED and 2L bolus. Pt on RA and oriented x4. Toradol, Morphine and dilaudid have been given for pain in ED.  Mental Status: oriented, alert, coherent, logical, and thought processes intact  Arrived from: home    Imaging:   XR ABDOMEN (KUB) (SINGLE AP VIEW)   Final Result       Right ureteral stone confirmed on CT.           ______________________________________    Electronically signed by: WILLARD LEGGETT M.D.   Date:     2025   Time:    17:33       CT ABDOMEN PELVIS WO CONTRAST Additional Contrast? None   Final Result   1.  Moderate right hydronephrosis secondary to a 0.5 cm obstructing proximal right ureteral calculus.   2.  Additional findings as described.       ---------------------------        All CT scans are performed using dose optimization techniques as appropriate to the performed exam and include    at least one of the following: Automated exposure control, adjustment of the mA and/or kV according to size, and the use of iterative reconstruction technique.        ______________________________________    Electronically signed by: CJ HERNANDEZ M.D.   Date:     2025   Time:    16:00         COVID-19 Results:   Internal Administration   First Dose COVID-19, PFIZER PURPLE top, DILUTE for use, (age 12 y+), 30mcg/0.3mL  2021   Second Dose COVID-19, PFIZER PURPLE top, DILUTE for use, (age 12 y+), 30mcg/0.3mL

## 2025-06-19 NOTE — ANESTHESIA PRE PROCEDURE
Department of Anesthesiology  Preprocedure Note       Name:  Chikis Mcnamara   Age:  40 y.o.  :  1985                                          MRN:  [id number]         Date:  2025      Surgeon: Surgeon(s):  Wood Adhikari MD    Procedure: Procedure(s):  ESWL EXTRACORPEAL SHOCK WAVE LITHOTRIPSY    Medications prior to admission:   Prior to Admission medications    Medication Sig Start Date End Date Taking? Authorizing Provider   traZODone (DESYREL) 50 MG tablet TAKE 2 TABLETS BY MOUTH ONCE DAILY AT BEDTIME FOR INSOMNIA 24   ProviderRossana MD   Semaglutide (OZEMPIC, 1 MG/DOSE, SC) Inject into the skin    ProviderRossana MD   buPROPion (WELLBUTRIN XL) 150 MG extended release tablet Take 1 tablet by mouth every morning    Rossana Harmon MD   tamsulosin (FLOMAX) 0.4 MG capsule Take 1 capsule by mouth daily  Patient not taking: Reported on 2025   Bharath Staley, APRN - NP   naproxen (NAPROSYN) 500 MG tablet Take 1 tablet by mouth 2 times daily for 10 days 22  Lia Benz APRN       Current medications:    Current Facility-Administered Medications   Medication Dose Route Frequency Provider Last Rate Last Admin   • levoFLOXacin (LEVAQUIN) 500 MG/100ML infusion 500 mg  500 mg IntraVENous On Call to OR Wood Adhikari MD       • aspirin-acetaminophen-caffeine (EXCEDRIN MIGRAINE) per tablet 1 tablet  1 tablet Oral Q8H PRN Cande Moon MD   1 tablet at 25 1355   • sodium chloride flush 0.9 % injection 5-40 mL  5-40 mL IntraVENous 2 times per day Steven Clark APRN - CNP   10 mL at 25 2200   • sodium chloride flush 0.9 % injection 5-40 mL  5-40 mL IntraVENous PRN Steven Clark APRN - CNP       • 0.9 % sodium chloride infusion   IntraVENous PRN Steven Clark APRN - CNP       • potassium chloride (KLOR-CON M) extended release tablet 40 mEq  40 mEq Oral PRN Steven Clark APRN - CNP        Or   • potassium bicarb-citric acid (EFFER-K)

## 2025-06-19 NOTE — OP NOTE
Operative Note      Patient: Chikis Mcnamara  YOB: 1985  MRN: [id number]    Date of Procedure: 6/19/2025    Pre-Op Diagnosis Codes:      * Right ureteral calculus [N20.1]    Post-Op Diagnosis: Same       Procedure(s):  RIGHT URETEROSCOPY, LASER LITHOTRIPSY, RIGHT URETERAL STENT PLACEMENT    Surgeon(s):  Wood Rebollar MD    Assistant:   * No surgical staff found *    Anesthesia: General    Estimated Blood Loss (mL): 0    Complications: None    Specimens:   * No specimens in log *    Implants:  Implant Name Type Inv. Item Serial No.  Lot No. LRB No. Used Action   STENT URETERAL [id number] SOFT MONOFILAMENT TRIA - [id number]  STENT URETERAL [id number] SOFT MONOFILAMENT TRIA  [employer]- [id number] Right 1 Implanted         Drains: * No LDAs found *    Findings:  Infection Present At Time Of Surgery (PATOS) (choose all levels that have infection present):  No infection present  Other Findings: Right proximal ureteral stone was able to fragment and washout.  She also had some tiny stones in the lower pole of the right kidney which were dusted.  6 Ghanaian by 26 cm Tria soft ureteral stent was placed with string attached.    Detailed Description of Procedure:   See dictated report: [id number]    Disposition to PACU then back to fifth floor.  If her pain is reasonably controlled overnight no fevers can go home in a.m.  She will need follow-up in 1 week for stent removal she does have history    Electronically signed by WOOD REBOLLAR MD on 6/19/2025 at 5:47 PM

## 2025-06-19 NOTE — ANESTHESIA POSTPROCEDURE EVALUATION
Department of Anesthesiology  Postprocedure Note    Patient: Chikis Mcnamara  MRN: [id number]  YOB: 1985  Date of evaluation: 6/19/2025    Procedure Summary       Date: 06/19/25 Room / Location: [facility]    Anesthesia Start: 1614 Anesthesia Stop: 1738    Procedure: RIGHT URETEROSCOPY, LASER LITHOTRIPSY, RIGHT URETERAL STENT PLACEMENT (Right) Diagnosis:       Right ureteral calculus      (Right ureteral calculus [N20.1])    Surgeons: Wood Adhikari MD Responsible Provider: Carlene Pimentel APRN - CRNA    Anesthesia Type: general ASA Status: 2            Anesthesia Type: No value filed.    Kiet Phase I: Kiet Score: 10    Kiet Phase II:      Anesthesia Post Evaluation    Patient location during evaluation: PACU  Patient participation: complete - patient participated  Level of consciousness: awake  Airway patency: patent  Nausea & Vomiting: no nausea  Cardiovascular status: hemodynamically stable  Respiratory status: acceptable  Hydration status: stable  Comments: /76   Pulse (!) 101   Temp 97.1 °F (36.2 °C)   Resp 12   SpO2 98%     Pain management: adequate    No notable events documented.

## 2025-06-20 VITALS
TEMPERATURE: 98.8 F | BODY MASS INDEX: 21 KG/M2 | DIASTOLIC BLOOD PRESSURE: 73 MMHG | HEIGHT: 64 IN | WEIGHT: 123 LBS | SYSTOLIC BLOOD PRESSURE: 110 MMHG | HEART RATE: 92 BPM | OXYGEN SATURATION: 97 % | RESPIRATION RATE: 16 BRPM

## 2025-06-20 LAB
ANION GAP SERPL CALCULATED.3IONS-SCNC: 10 MMOL/L (ref 8–16)
BACTERIA UR CULT: NORMAL
BUN SERPL-MCNC: 6 MG/DL (ref 6–20)
CALCIUM SERPL-MCNC: 8.2 MG/DL (ref 8.6–10)
CHLORIDE SERPL-SCNC: 106 MMOL/L (ref 98–107)
CO2 SERPL-SCNC: 22 MMOL/L (ref 22–29)
CREAT SERPL-MCNC: 0.6 MG/DL (ref 0.5–0.9)
ERYTHROCYTE [DISTWIDTH] IN BLOOD BY AUTOMATED COUNT: 12.9 % (ref 11.5–14.5)
GLUCOSE BLD-MCNC: 100 MG/DL (ref 70–99)
GLUCOSE SERPL-MCNC: 115 MG/DL (ref 70–99)
HCT VFR BLD AUTO: 30 % (ref 37–47)
HGB BLD-MCNC: 10.8 G/DL (ref 12–16)
MCH RBC QN AUTO: 31.2 PG (ref 27–31)
MCHC RBC AUTO-ENTMCNC: 36 G/DL (ref 33–37)
MCV RBC AUTO: 86.7 FL (ref 81–99)
PERFORMED ON: ABNORMAL
PLATELET # BLD AUTO: 143 K/UL (ref 130–400)
PMV BLD AUTO: 11.5 FL (ref 9.4–12.3)
POTASSIUM SERPL-SCNC: 3.8 MMOL/L (ref 3.5–5)
RBC # BLD AUTO: 3.46 M/UL (ref 4.2–5.4)
SODIUM SERPL-SCNC: 138 MMOL/L (ref 136–145)
WBC # BLD AUTO: 6.8 K/UL (ref 4.8–10.8)

## 2025-06-20 PROCEDURE — 36415 COLL VENOUS BLD VENIPUNCTURE: CPT

## 2025-06-20 PROCEDURE — 6370000000 HC RX 637 (ALT 250 FOR IP): Performed by: UROLOGY

## 2025-06-20 PROCEDURE — 85027 COMPLETE CBC AUTOMATED: CPT

## 2025-06-20 PROCEDURE — 80048 BASIC METABOLIC PNL TOTAL CA: CPT

## 2025-06-20 PROCEDURE — 99231 SBSQ HOSP IP/OBS SF/LOW 25: CPT | Performed by: NURSE PRACTITIONER

## 2025-06-20 PROCEDURE — 82962 GLUCOSE BLOOD TEST: CPT

## 2025-06-20 PROCEDURE — 6360000002 HC RX W HCPCS: Performed by: UROLOGY

## 2025-06-20 RX ORDER — OXYCODONE AND ACETAMINOPHEN 5; 325 MG/1; MG/1
1 TABLET ORAL EVERY 6 HOURS PRN
Qty: 12 TABLET | Refills: 0 | Status: SHIPPED | OUTPATIENT
Start: 2025-06-20 | End: 2025-06-23

## 2025-06-20 RX ORDER — PHENAZOPYRIDINE HYDROCHLORIDE 100 MG/1
100 TABLET, FILM COATED ORAL 3 TIMES DAILY PRN
Qty: 15 TABLET | Refills: 0 | Status: SHIPPED | OUTPATIENT
Start: 2025-06-20

## 2025-06-20 RX ORDER — CEPHALEXIN 500 MG/1
500 CAPSULE ORAL DAILY
Qty: 3 CAPSULE | Refills: 0 | Status: SHIPPED | OUTPATIENT
Start: 2025-06-20 | End: 2025-06-23

## 2025-06-20 RX ADMIN — OXYCODONE HYDROCHLORIDE AND ACETAMINOPHEN 1 TABLET: 7.5; 325 TABLET ORAL at 08:01

## 2025-06-20 RX ADMIN — HYDROMORPHONE HYDROCHLORIDE 0.5 MG: 1 INJECTION, SOLUTION INTRAMUSCULAR; INTRAVENOUS; SUBCUTANEOUS at 02:12

## 2025-06-20 RX ADMIN — TAMSULOSIN HYDROCHLORIDE 0.4 MG: 0.4 CAPSULE ORAL at 07:59

## 2025-06-20 ASSESSMENT — PAIN DESCRIPTION - DESCRIPTORS: DESCRIPTORS: ACHING

## 2025-06-20 ASSESSMENT — PAIN DESCRIPTION - LOCATION: LOCATION: VAGINA

## 2025-06-20 ASSESSMENT — PAIN DESCRIPTION - ORIENTATION: ORIENTATION: MID

## 2025-06-20 ASSESSMENT — PAIN SCALES - GENERAL
PAINLEVEL_OUTOF10: 6
PAINLEVEL_OUTOF10: 8

## 2025-06-20 NOTE — DISCHARGE SUMMARY
Discharge Summary    Date:6/20/2025        Patient Name:Chikis Mcnamara     YOB: 1985     Age:40 y.o.    Admit Date:6/18/2025   Admission Condition:fair   Discharged Condition:stable  Discharge Date: 06/20/25       Discharge Diagnoses   Principal Problem:    Calculus of proximal right ureter  Active Problems:    Renal colic on right side    Intractable pain    UTI (urinary tract infection)  Resolved Problems:    * No resolved hospital problems. *      Hospital Stay   Narrative of Hospital Course:     40 year old female with history of urinary tract stones and diabetes who presented to [facility] ED for evaluation of right flank pain. In ED, CT abd/pel with moderate right hydronephrosis secondary to a 0.5 cm obstructing proximal right ureteral calculus, was given rocephin 1g iv for uti, seen by urology s/p Right ureteroscopy, laser lithotripsy, and placement of a 6-[place]an x 26 cm Tria Soft ureteral stent. To complete course of antibiotics outpt, follow up with urology I 1 week for stent removal.      Physical Examination:  General: Well-developed, no acute distress lying comfortably in bed.  HEENT: Atraumatic normocephalic, range of motion normal, no JVD, no tracheal deviation noted.  Cardiac: Normal S1-S2   Respiratory: clear To auscultation bilaterally, no rhonchi or rales, no wheezing  Abdomen: Soft, positive bowel sounds in all quadrants, no distention, nontender to palpation, no rebound noted.    : No CVA tenderness  Extremities: no tenderness, no edema, moves all extremities  Psych: Affect normal and good eye contact, behavioral normal.      Consultants:   IP CONSULT TO UROLOGY    Time Spent on Discharge:  35 minutes were spent in patient examination, evaluation, counseling as well as medication reconciliation, prescriptions for required medications, discharge plan and follow up.      Surgeries/Procedures Performed:  Procedure(s):  RIGHT URETEROSCOPY, LASER LITHOTRIPSY, RIGHT URETERAL STENT PLACEMENT

## 2025-06-20 NOTE — PROGRESS NOTES
Urology Progress Note      SUBJECTIVE:  Complaints of pelvic soreness, much improved post op    OBJECTIVE:   Review of Systems     Physical  VITALS:  /73   Pulse 92   Temp 98.8 °F (37.1 °C) (Oral)   Resp 16   Ht 1.626 m (5' 4\")   Wt 55.8 kg (123 lb)   LMP 06/15/2025 (Exact Date)   SpO2 97%   BMI 21.11 kg/m²   TEMPERATURE:  Current - Temp: 98.8 °F (37.1 °C); Max - Temp  Av.7 °F (36.5 °C)  Min: 97.1 °F (36.2 °C)  Max: 98.8 °F (37.1 °C)   24 HR I&O   Intake/Output Summary (Last 24 hours) at 2025 0722  Last data filed at 2025 0618  Gross per 24 hour   Intake 1000 ml   Output 1902 ml   Net -902 ml       Physical Exam   BACK: flank tenderness: right  ABDOMEN:  soft, non-distended, and tenderness noted in the suprapubic region  HEART:  normal rate  CHEST:  normal respiratory effort   GENITAL/URINARY:  voiding     Data  CBC:   Recent Labs     25  1440 25  0404 25  0346   WBC 12.1* 7.0 6.8   HGB 15.1 11.1* 10.8*   HCT 40.4 29.9* 30.0*    145 143     BMP:    Recent Labs     25  1440 25  0404 25  0346    141 138   K 4.2 3.6 3.8    109* 106   CO2 20* 23 22   BUN 8 6 6   CREATININE 0.8 0.7 0.6   GLUCOSE 100* 86 115*       Recent Labs     25  1514   LABURIN Mixed skin aleena present     Recent Labs     25  1510   BC No Growth to date.  Any change in status will be called.     Recent Labs     25  1518   BLOODCULT2 No Growth to date.  Any change in status will be called.         U/A:    Lab Results   Component Value Date/Time    NITRITE neg 2017 10:21 AM    COLORU YELLOW 2025 03:14 PM    PHUR 6.0 2025 03:14 PM    PHUR 7.0 04/10/2018 01:13 PM    WBCUA 10-15 2025 03:14 PM    RBCUA 50-75 2025 03:14 PM    RBCUA 224 2015 10:15 PM    YEAST 0 2016 04:32 PM    BACTERIA Rare 2025 03:14 PM    CLARITYU Clear 2025 03:14 PM    SPECGRAV 1.015 2017 10:21 AM    LEUKOCYTESUR SMALL

## 2025-06-23 LAB
BACTERIA BLD CULT ORG #2: NORMAL
BACTERIA BLD CULT: NORMAL

## 2025-06-24 LAB
HPV HR 12 DNA SPEC QL NAA+PROBE: NOT DETECTED
HPV16 DNA SPEC QL NAA+PROBE: NOT DETECTED
HPV16+18+H RISK 12 DNA SPEC-IMP: NORMAL
HPV18 DNA SPEC QL NAA+PROBE: NOT DETECTED

## 2025-06-24 NOTE — PROGRESS NOTES
Physician Progress Note      PATIENT:               EL BROWN  [facility] #:                  [id number]  :                       1985  ADMIT DATE:       2025 3:28 PM  DISCH DATE:        2025 1:20 PM  RESPONDING  PROVIDER #:        Cande Moon MD          QUERY TEXT:    UTI is documented in the medical record Discharge Summary by Cande Moon MD at 2025 with Urine culture <50,000 CFU/ml mixed skin/urogenital   aleena. Please provide additional clinical indicators supportive of your   documentation. Or please document if the diagnosis of UTI has been ruled out   after study.    The clinical indicators include:  This is a 40 y.o. female who presented for evaluation of flank pain.      \" UTI (urinary tract infection) -rocephin 1g iv daily \" (from H&P by Steven Clark APRN - CNP at 2025)    \" Her urine does not look infected.  Her white count was 12,000. She is been   given a dose of Rocephin and blood and urine cultures have been obtained. \"   (from Urology Consults by Wood Adhikari MD at 2025)    \" Right Renal Colic with uncontrolled pain. Right proximal ureteral calculus.   CT abd/pel with moderate right hydronephrosis secondary to a 0.5 cm   obstructing proximal right ureteral calculus.    UTI: Rocephin. Follow culture \" (from Internal Medicine Progress Notes by   Cande Moon MD at 2025)    \"  In ED, CT abd/pel with moderate right hydronephrosis secondary to a 0.5 cm   obstructing proximal right ureteral calculus, was given rocephin 1g iv for   uti, seen by urology s/p Right ureteroscopy, laser lithotripsy, and placement   of a 6-[place]wegian x 26 cm Tria Soft ureteral stent. To complete course of   antibiotics outpt, follow up with urology I 1 week for stent removal. \" (from   Discharge Summary by Cande Moon MD at 2025)      Genitourinary:  Positive for flank pain and vaginal bleeding. Negative for   dysuria, frequency and

## 2025-06-25 ENCOUNTER — OFFICE VISIT (OUTPATIENT)
Dept: UROLOGY | Age: 40
End: 2025-06-25
Payer: MEDICAID

## 2025-06-25 VITALS — BODY MASS INDEX: 21.68 KG/M2 | HEIGHT: 64 IN | WEIGHT: 127 LBS | TEMPERATURE: 98.1 F

## 2025-06-25 DIAGNOSIS — Z96.0 RETAINED URETERAL STENT: Primary | ICD-10-CM

## 2025-06-25 DIAGNOSIS — N20.0 BILATERAL KIDNEY STONES: ICD-10-CM

## 2025-06-25 DIAGNOSIS — N20.1 CALCULUS OF PROXIMAL RIGHT URETER: ICD-10-CM

## 2025-06-25 LAB
BACTERIA URINE, POC: 0
BILIRUBIN URINE: 0 MG/DL
BLOOD, URINE: POSITIVE
CASTS URINE, POC: ABNORMAL
CLARITY, UA: CLEAR
COLOR, UA: YELLOW
CRYSTALS URINE, POC: ABNORMAL
EPI CELLS URINE, POC: ABNORMAL
GLUCOSE URINE: ABNORMAL
KETONES, URINE: NEGATIVE
LEUKOCYTE EST, POC: ABNORMAL
NITRITE, URINE: NEGATIVE
PH UA: 7.5 (ref 4.5–8)
PROTEIN UA: ABNORMAL
RBC URINE, POC: 28
SPECIFIC GRAVITY UA: 1.02 (ref 1–1.03)
UROBILINOGEN, URINE: ABNORMAL
WBC URINE, POC: 2
YEAST URINE, POC: ABNORMAL

## 2025-06-25 PROCEDURE — 81001 URINALYSIS AUTO W/SCOPE: CPT | Performed by: NURSE PRACTITIONER

## 2025-06-25 PROCEDURE — 99213 OFFICE O/P EST LOW 20 MIN: CPT | Performed by: NURSE PRACTITIONER

## 2025-06-25 ASSESSMENT — ENCOUNTER SYMPTOMS
BACK PAIN: 0
ABDOMINAL PAIN: 0
ABDOMINAL DISTENTION: 0
NAUSEA: 0
VOMITING: 0

## 2025-06-25 NOTE — PROGRESS NOTES
Patient of Dr. Adhikari states they are here today to have stent removed S.P. RIGHT URETEROSCOPY, LASER LITHOTRIPSY, RIGHT URETERAL STENT PLACEMENT  on 06/19/2025. Patient denies any fever, chills, nausea or vomiting.The string was located and stent was pulled with no complications. The patient was advised to continue any medications prescribed by Dr. Adhikari or MARIO Nieto and to call if they have any questions or concerns. Patient verbalized understanding. Patient will follow up as scheduled.     MARIO NIETO was in office at time of procedure.  
me.    ROXANNA Dragon/transcription disclaimer: Much of this document is electronic transcription/translation of spoken language to printed text. The electronic translation of spoken language may be erroneous or, at times, nonsensical words or phrases may be inadvertently transcribed. Although I have reviewed the document for such errors, some may still exist.

## 2025-06-26 ENCOUNTER — HOSPITAL ENCOUNTER (OUTPATIENT)
Dept: MRI IMAGING | Facility: HOSPITAL | Age: 40
Discharge: HOME | End: 2025-06-26
Payer: MEDICAID

## 2025-06-26 DIAGNOSIS — G89.29 CHRONIC BILATERAL LOW BACK PAIN WITHOUT SCIATICA: ICD-10-CM

## 2025-06-26 DIAGNOSIS — M54.6 CHRONIC MIDLINE THORACIC BACK PAIN: ICD-10-CM

## 2025-06-26 DIAGNOSIS — M54.50 CHRONIC BILATERAL LOW BACK PAIN WITHOUT SCIATICA: ICD-10-CM

## 2025-06-26 DIAGNOSIS — G89.29 CHRONIC MIDLINE THORACIC BACK PAIN: ICD-10-CM

## 2025-07-08 ENCOUNTER — OFFICE VISIT (OUTPATIENT)
Dept: ENT CLINIC | Age: 40
End: 2025-07-08
Payer: MEDICAID

## 2025-07-08 ENCOUNTER — OFFICE VISIT (OUTPATIENT)
Age: 40
End: 2025-07-08
Payer: MEDICAID

## 2025-07-08 VITALS
BODY MASS INDEX: 21.17 KG/M2 | DIASTOLIC BLOOD PRESSURE: 82 MMHG | SYSTOLIC BLOOD PRESSURE: 118 MMHG | WEIGHT: 124 LBS | HEIGHT: 64 IN

## 2025-07-08 VITALS
DIASTOLIC BLOOD PRESSURE: 74 MMHG | HEIGHT: 64 IN | SYSTOLIC BLOOD PRESSURE: 102 MMHG | BODY MASS INDEX: 21.29 KG/M2 | WEIGHT: 124.7 LBS

## 2025-07-08 DIAGNOSIS — N81.6 CYSTOCELE WITH RECTOCELE: ICD-10-CM

## 2025-07-08 DIAGNOSIS — Z78.9 NON-SMOKER: ICD-10-CM

## 2025-07-08 DIAGNOSIS — N81.10 CYSTOCELE WITH RECTOCELE: ICD-10-CM

## 2025-07-08 DIAGNOSIS — N81.4 UTERINE PROLAPSE: Primary | ICD-10-CM

## 2025-07-08 DIAGNOSIS — H93.13 TINNITUS OF BOTH EARS: Primary | ICD-10-CM

## 2025-07-08 DIAGNOSIS — H69.91 ETD (EUSTACHIAN TUBE DYSFUNCTION), RIGHT: ICD-10-CM

## 2025-07-08 PROCEDURE — 99213 OFFICE O/P EST LOW 20 MIN: CPT | Performed by: NURSE PRACTITIONER

## 2025-07-08 RX ORDER — PRIMIDONE 50 MG/1
TABLET ORAL
COMMUNITY

## 2025-07-08 RX ORDER — PRIMIDONE 50 MG/1
50 TABLET ORAL NIGHTLY
COMMUNITY

## 2025-07-08 RX ORDER — LEVOCETIRIZINE DIHYDROCHLORIDE 5 MG/1
5 TABLET, FILM COATED ORAL NIGHTLY
Qty: 30 TABLET | Refills: 1 | Status: SHIPPED | OUTPATIENT
Start: 2025-07-08

## 2025-07-08 ASSESSMENT — ENCOUNTER SYMPTOMS
GASTROINTESTINAL NEGATIVE: 1
EYES NEGATIVE: 1
RESPIRATORY NEGATIVE: 1
ALLERGIC/IMMUNOLOGIC NEGATIVE: 1

## 2025-07-08 NOTE — PROGRESS NOTES
2025    Chikis Mcnamara (:  1985) is a 40 y.o. female, Established patient, here for evaluation of the following chief complaint(s):  New Patient (Bilateral tinnitus, right ear is worse)      Vitals:    25 1038   BP: 118/82   Weight: 56.2 kg (124 lb)   Height: 1.626 m (5' 4\")       Wt Readings from Last 3 Encounters:   25 56.2 kg (124 lb)   25 57.6 kg (127 lb)   25 55.8 kg (123 lb)       BP Readings from Last 3 Encounters:   25 118/82   25 110/73   25 123/80         SUBJECTIVE/OBJECTIVE:    Patient seen today for her ears. She complains of constant tinnitus in both ears that is worse on the right. She has had this for several months but less than a year. She also reports that her right ear will clog more, especially when she yawns. She reports her ear does not feel clogged now. She reports that her right ear will pop at times also. She denies hearing changes. She denies history of noise exposure, ear pain, or ear drainage. She has not had a hearing test.         Review of Systems   Constitutional: Negative.    HENT:  Positive for ear pain (intermittent fullness on right) and tinnitus (R>L).    Eyes: Negative.    Respiratory: Negative.     Cardiovascular: Negative.    Gastrointestinal: Negative.    Endocrine: Negative.    Musculoskeletal: Negative.    Skin: Negative.    Allergic/Immunologic: Negative.    Neurological: Negative.    Hematological: Negative.    Psychiatric/Behavioral: Negative.          Physical Exam  Vitals reviewed.   Constitutional:       Appearance: Normal appearance. She is normal weight.   HENT:      Head: Normocephalic and atraumatic.      Right Ear: Tympanic membrane, ear canal and external ear normal.      Left Ear: Tympanic membrane, ear canal and external ear normal.      Nose: Nose normal.      Mouth/Throat:      Mouth: Mucous membranes are moist.      Pharynx: Oropharynx is clear.   Eyes:      Extraocular Movements: Extraocular

## 2025-07-08 NOTE — PROGRESS NOTES
"Chief Complaint  Follow-up (Pt here with concerns of prolapse, she c/o lower pelvic pressure and has worsened in the last couple months, worse with periods, and she reports she can feel it coming out)    Subjective        Sandy Page presents to [facility]  History of Present Illness    History of Present Illness  The patient is a 40-year-old female who presents with a new complaint of pelvic pressure.    She reports a sensation of something protruding from her uterus, which was confirmed as a prolapse during an ER visit. This condition has been causing discomfort during bowel movements and sexual intercourse for several months. Initially, her primary care physician suspected a hernia or rectal issue. She experiences a noticeable sensation when walking, although it is not painful. Her urinary function appears normal, but she frequently experiences kidney stones. She also reports that straining during bowel movements exacerbates the prolapse. She has been managing her bowel movements with MiraLAX and Metamucil. A Pap smear conducted 4 weeks ago at [facility] showed no abnormalities. She has been performing pelvic exercises independently for an extended period.    GYNECOLOGICAL HISTORY:  - Gynecology History discussed    Objective   Vital Signs:  /74 (BP Location: Left arm, Patient Position: Sitting, Cuff Size: Adult)   Ht 162.6 cm (64\")   Wt 56.6 kg (124 lb 11.2 oz)   BMI 21.40 kg/m²   Estimated body mass index is 21.4 kg/m² as calculated from the following:    Height as of this encounter: 162.6 cm (64\").    Weight as of this encounter: 56.6 kg (124 lb 11.2 oz).    BMI is within normal parameters. No other follow-up for BMI required.      Physical Exam  Exam conducted with a chaperone present.   Genitourinary:     General: Normal vulva.      Exam position: Lithotomy position.      Vagina: Normal.      Cervix: Normal.      Uterus: Normal.       Adnexa: Right adnexa normal and left " adnexa normal.              Result Review :                Assessment and Plan   Diagnoses and all orders for this visit:    1. Uterine prolapse (Primary)  -     Ambulatory Referral to Physical Therapy for Evaluation & Treatment    2. Cystocele with rectocele  -     Ambulatory Referral to Physical Therapy for Evaluation & Treatment    3. Non-smoker        Assessment & Plan  1. Pelvic pressure.  - The pelvic pressure is primarily due to a rectocele, indicating a weakness in the posterior wall between the vagina and rectum.  - The cervix has descended slightly, and there is a bulge in the anterior wall where the bladder is located. These issues are not due to problems with the bladder, uterus, or rectum, but rather a weakened pelvic floor.  - The patient was informed that strengthening the pelvic floor could alleviate her symptoms. A referral for pelvic floor physical therapy was provided.  - A referral for pelvic floor physical therapy was provided. She was advised to inform us once she begins her therapy so that a follow-up appointment can be scheduled for 3 months post-initiation of the therapy.    Follow-up: A follow-up appointment will be scheduled for 3 months after the initiation of her pelvic floor physical therapy.         Follow Up   Return for F/U with me 3 months after starting PT.  Patient was given instructions and counseling regarding her condition or for health maintenance advice. Please see specific information pulled into the AVS if appropriate.         Kendall Goldberg MD    Patient or patient representative verbalized consent for the use of Ambient Listening during the visit with  Kendall Goldberg MD for chart documentation. 7/8/2025  10:01 CDT

## 2025-07-10 ENCOUNTER — HOSPITAL ENCOUNTER (OUTPATIENT)
Dept: MRI IMAGING | Facility: HOSPITAL | Age: 40
Discharge: HOME | End: 2025-07-10
Payer: MEDICAID

## 2025-07-10 PROCEDURE — 72146 MRI CHEST SPINE W/O DYE: CPT

## 2025-07-10 PROCEDURE — 72148 MRI LUMBAR SPINE W/O DYE: CPT

## 2025-07-11 ENCOUNTER — RESULTS FOLLOW-UP (OUTPATIENT)
Dept: NEUROSURGERY | Facility: CLINIC | Age: 40
End: 2025-07-11
Payer: MEDICAID

## 2025-07-11 DIAGNOSIS — M54.6 CHRONIC MIDLINE THORACIC BACK PAIN: Primary | ICD-10-CM

## 2025-07-11 DIAGNOSIS — G89.29 CHRONIC BILATERAL LOW BACK PAIN WITHOUT SCIATICA: ICD-10-CM

## 2025-07-11 DIAGNOSIS — M54.50 CHRONIC BILATERAL LOW BACK PAIN WITHOUT SCIATICA: ICD-10-CM

## 2025-07-11 DIAGNOSIS — G89.29 CHRONIC MIDLINE THORACIC BACK PAIN: Primary | ICD-10-CM

## 2025-07-11 NOTE — TELEPHONE ENCOUNTER
----- Message from Jefferson Hatch sent at 7/11/2025 10:46 AM CDT -----  She has mild degeneration in her back.  She needs to go to pain management to try injections and see if this will help with her symptoms  ----- Message -----  From: Annie Rad Results [place] [facility]  Sent: 7/10/2025   4:43 PM CDT  To: DEBI Valdez

## 2025-07-11 NOTE — TELEPHONE ENCOUNTER
Placed a call to inform pt of imaging results and recommendations. 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 Informed pt I would ask Jefferson and give her a call back. Per pt she would go to any pain mgmt that our office sends her to. Informed pt once the office gets her referral there office will give pt a call back. Ended call with pt understanding and acknowledgment.

## 2025-07-14 DIAGNOSIS — G89.29 CHRONIC MIDLINE THORACIC BACK PAIN: Primary | ICD-10-CM

## 2025-07-14 DIAGNOSIS — M54.50 CHRONIC BILATERAL LOW BACK PAIN WITHOUT SCIATICA: ICD-10-CM

## 2025-07-14 DIAGNOSIS — G89.29 CHRONIC BILATERAL LOW BACK PAIN WITHOUT SCIATICA: ICD-10-CM

## 2025-07-14 DIAGNOSIS — M54.6 CHRONIC MIDLINE THORACIC BACK PAIN: Primary | ICD-10-CM

## 2025-07-14 NOTE — TELEPHONE ENCOUNTER
Placed a call to inform pt nothing to worry about cysts, that's not causing any problems. Ended call with pt understanding and acknowledgment.

## 2025-07-18 PROBLEM — N39.0 UTI (URINARY TRACT INFECTION): Status: RESOLVED | Noted: 2025-06-18 | Resolved: 2025-07-18

## 2025-08-05 ENCOUNTER — OFFICE VISIT (OUTPATIENT)
Dept: ENT CLINIC | Age: 40
End: 2025-08-05
Payer: MEDICAID

## 2025-08-05 ENCOUNTER — PROCEDURE VISIT (OUTPATIENT)
Dept: ENT CLINIC | Age: 40
End: 2025-08-05
Payer: MEDICAID

## 2025-08-05 VITALS
WEIGHT: 120 LBS | HEIGHT: 64 IN | BODY MASS INDEX: 20.49 KG/M2 | SYSTOLIC BLOOD PRESSURE: 100 MMHG | DIASTOLIC BLOOD PRESSURE: 62 MMHG

## 2025-08-05 DIAGNOSIS — H93.13 TINNITUS OF BOTH EARS: Primary | ICD-10-CM

## 2025-08-05 DIAGNOSIS — H93.13 BILATERAL TINNITUS: Primary | ICD-10-CM

## 2025-08-05 DIAGNOSIS — R26.89 BALANCE DISORDER: ICD-10-CM

## 2025-08-05 PROCEDURE — 99213 OFFICE O/P EST LOW 20 MIN: CPT | Performed by: NURSE PRACTITIONER

## 2025-08-05 PROCEDURE — 92557 COMPREHENSIVE HEARING TEST: CPT | Performed by: AUDIOLOGIST-HEARING AID FITTER

## 2025-08-05 PROCEDURE — 92567 TYMPANOMETRY: CPT | Performed by: AUDIOLOGIST-HEARING AID FITTER

## 2025-08-05 RX ORDER — METHYLPREDNISOLONE 4 MG/1
TABLET ORAL
Qty: 21 TABLET | Refills: 0 | Status: SHIPPED | OUTPATIENT
Start: 2025-08-05 | End: 2025-08-11

## 2025-08-12 ENCOUNTER — TELEPHONE (OUTPATIENT)
Dept: ENT CLINIC | Age: 40
End: 2025-08-12

## 2025-08-12 DIAGNOSIS — H93.13 TINNITUS OF BOTH EARS: Primary | ICD-10-CM

## 2025-08-12 DIAGNOSIS — R20.2 TINGLING SENSATION: ICD-10-CM

## 2025-08-12 RX ORDER — VALACYCLOVIR HYDROCHLORIDE 500 MG/1
500 TABLET, FILM COATED ORAL 2 TIMES DAILY
Qty: 28 TABLET | Refills: 0 | Status: SHIPPED | OUTPATIENT
Start: 2025-08-12 | End: 2025-08-26

## 2025-08-19 ENCOUNTER — HOSPITAL ENCOUNTER (OUTPATIENT)
Dept: CT IMAGING | Age: 40
Discharge: HOME | End: 2025-08-19
Payer: MEDICAID

## 2025-08-19 DIAGNOSIS — H93.13 TINNITUS OF BOTH EARS: ICD-10-CM

## 2025-08-19 PROCEDURE — 6360000004 HC RX CONTRAST MEDICATION: Performed by: NURSE PRACTITIONER

## 2025-08-19 PROCEDURE — 70482 CT ORBIT/EAR/FOSSA W/O&W/DYE: CPT

## 2025-08-19 RX ORDER — IOPAMIDOL 755 MG/ML
70 INJECTION, SOLUTION INTRAVASCULAR
Status: COMPLETED | OUTPATIENT
Start: 2025-08-19 | End: 2025-08-19

## 2025-08-19 RX ADMIN — IOPAMIDOL 70 ML: 755 INJECTION, SOLUTION INTRAVENOUS at 14:54

## 2025-09-02 ENCOUNTER — OFFICE VISIT (OUTPATIENT)
Dept: ENT CLINIC | Age: 40
End: 2025-09-02
Payer: MEDICAID

## 2025-09-02 VITALS
DIASTOLIC BLOOD PRESSURE: 78 MMHG | SYSTOLIC BLOOD PRESSURE: 118 MMHG | HEIGHT: 64 IN | WEIGHT: 118 LBS | BODY MASS INDEX: 20.14 KG/M2

## 2025-09-02 DIAGNOSIS — H93.8X1 PRESSURE SENSATION IN EAR, RIGHT: ICD-10-CM

## 2025-09-02 DIAGNOSIS — H93.13 TINNITUS OF BOTH EARS: Primary | ICD-10-CM

## 2025-09-02 PROCEDURE — 99214 OFFICE O/P EST MOD 30 MIN: CPT | Performed by: OTOLARYNGOLOGY

## 2025-09-02 PROCEDURE — 69420 INCISION OF EARDRUM: CPT | Performed by: OTOLARYNGOLOGY

## 2025-09-02 ASSESSMENT — ENCOUNTER SYMPTOMS
ALLERGIC/IMMUNOLOGIC NEGATIVE: 1
RESPIRATORY NEGATIVE: 1
GASTROINTESTINAL NEGATIVE: 1
EYES NEGATIVE: 1

## (undated) DEVICE — GLV SURG SENSICARE W/ALOE PF LF SZ6 STRL

## (undated) DEVICE — LARGE, DISPOSABLE C-SECTION RETRACTOR: Brand: ALEXIS ® O C-SECTION PROTECTOR/RETRACTOR

## (undated) DEVICE — KENDALL SCD EXPRESS SLEEVES, KNEE LENGTH, LARGE: Brand: KENDALL SCD

## (undated) DEVICE — KT DRP MINIVIEW STRL

## (undated) DEVICE — BAPTIST TURNOVER KIT: Brand: MEDLINE INDUSTRIES, INC.

## (undated) DEVICE — GOWN,PREVENTION PLUS,XLARGE,STERILE: Brand: MEDLINE

## (undated) DEVICE — INFLATION DEVICE: Brand: ENCORE™ 26

## (undated) DEVICE — SUT VIC 0 CTX 36IN J978H

## (undated) DEVICE — SUT MNCRYL 0/0 CTX 36IN Y398H

## (undated) DEVICE — PAD D&C: Brand: MEDLINE INDUSTRIES, INC.

## (undated) DEVICE — BALLOON DILATATION CATHETER: Brand: UROMAX ULTRA

## (undated) DEVICE — NON-ADHERENT PADS PREPACK: Brand: TELFA

## (undated) DEVICE — U.V.A.C. SWIVEL HANDLE W/TUBING, 6': Brand: CONVERTORS

## (undated) DEVICE — CANSTR SXN UTER NO FLTR SURG

## (undated) DEVICE — CATHETER URET 5FR L70CM OPN END SGL LUMN INJ HUB FLEXIMA

## (undated) DEVICE — GAUZE,SPONGE,4"X4",8PLY,STRL,LF,10/TRAY: Brand: MEDLINE

## (undated) DEVICE — Device

## (undated) DEVICE — SUT VIC RAPD SZ 3/0 27IN PS1 VR935

## (undated) DEVICE — SUT GUT PLAIN TPR PT 2/0 27IN 53T

## (undated) DEVICE — BAG DRNGE COMB PK

## (undated) DEVICE — SOLUTION IRRIG 3000ML 0.9% SOD CHL USP UROMATIC PLAS CONT

## (undated) DEVICE — CURETTE CANN RIGID CRV SHT 8MM STRL

## (undated) DEVICE — ADHS LIQ MASTISOL 2/3ML

## (undated) DEVICE — 3M™ MEDIPORE™ H SOFT CLOTH SURGICAL TAPE 2868, 8 INCH X 10 YARD (20,3CM X 9,1M), 6 ROLLS/CASE: Brand: 3M™ MEDIPORE™

## (undated) DEVICE — 3M™ STERI-STRIP™ BLEND TONE SKIN CLOSURES, B1557, TAN, 1/2 IN X 4 IN (12MM X 100MM), 6 STRIPS/ENVELOPE: Brand: 3M™ STERI-STRIP™

## (undated) DEVICE — PAD C-SECTION: Brand: MEDLINE INDUSTRIES, INC.

## (undated) DEVICE — 3M™ STERI-STRIP™ REINFORCED ADHESIVE SKIN CLOSURES, R1547, 1/2 IN X 4 IN (12 MM X 100 MM), 6 STRIPS/ENVELOPE: Brand: 3M™ STERI-STRIP™

## (undated) DEVICE — GUIDEWIRE ENDOSCP L150CM DIA0.035IN TIP 3CM PTFE NIT

## (undated) DEVICE — SEAL ENDO INSTR SELF SEAL UROLOGY

## (undated) DEVICE — SPNG GZ WOVN 4X4IN 12PLY 10/BX STRL

## (undated) DEVICE — TBG W FLTR FOR BERKELEY SYSTEM

## (undated) DEVICE — SUT VIC RAPD SZ 2/0 36IN CT1 VR945 BX/12

## (undated) DEVICE — APPL CHLORAPREP HI/LITE 26ML ORNG

## (undated) DEVICE — Y-TYPE TUR/BLADDER IRRIGATION SET, REGULATING CLAMP

## (undated) DEVICE — GLOVE SURG SZ 75 CRM LTX FREE POLYISOPRENE POLYMER BEAD ANTI

## (undated) DEVICE — FIBER LASER 200UM 2J 80HZ 60W D F L FOR LITHO MOSES